# Patient Record
Sex: FEMALE | Race: WHITE | NOT HISPANIC OR LATINO | Employment: OTHER | URBAN - METROPOLITAN AREA
[De-identification: names, ages, dates, MRNs, and addresses within clinical notes are randomized per-mention and may not be internally consistent; named-entity substitution may affect disease eponyms.]

---

## 2018-10-01 ENCOUNTER — OFFICE VISIT - HEALTHEAST (OUTPATIENT)
Dept: FAMILY MEDICINE | Facility: CLINIC | Age: 48
End: 2018-10-01

## 2018-10-01 DIAGNOSIS — Z00.00 HEALTH CARE MAINTENANCE: ICD-10-CM

## 2018-10-01 DIAGNOSIS — J30.9 ALLERGIC RHINITIS: ICD-10-CM

## 2018-10-01 DIAGNOSIS — Z12.31 VISIT FOR SCREENING MAMMOGRAM: ICD-10-CM

## 2018-10-01 DIAGNOSIS — E66.9 OBESITY (BMI 30-39.9): ICD-10-CM

## 2018-10-01 LAB
FASTING STATUS PATIENT QL REPORTED: NORMAL
GLUCOSE BLD-MCNC: 85 MG/DL (ref 74–125)
TSH SERPL DL<=0.005 MIU/L-ACNC: 1.45 UIU/ML (ref 0.3–5)

## 2018-10-01 ASSESSMENT — MIFFLIN-ST. JEOR: SCORE: 1537.71

## 2018-10-22 ENCOUNTER — HOSPITAL ENCOUNTER (OUTPATIENT)
Dept: MAMMOGRAPHY | Facility: CLINIC | Age: 48
Discharge: HOME | End: 2018-10-22
Attending: FAMILY MEDICINE

## 2018-10-22 DIAGNOSIS — Z12.31 VISIT FOR SCREENING MAMMOGRAM: ICD-10-CM

## 2020-07-06 ENCOUNTER — HOSPITAL ENCOUNTER (OUTPATIENT)
Dept: MAMMOGRAPHY | Facility: CLINIC | Age: 50
Discharge: HOME | End: 2020-07-06

## 2020-07-06 DIAGNOSIS — Z12.31 SCREENING MAMMOGRAM, ENCOUNTER FOR: ICD-10-CM

## 2020-07-16 ENCOUNTER — COMMUNICATION - HEALTHEAST (OUTPATIENT)
Dept: SCHEDULING | Facility: CLINIC | Age: 50
End: 2020-07-16

## 2020-07-16 ENCOUNTER — OFFICE VISIT - HEALTHEAST (OUTPATIENT)
Dept: FAMILY MEDICINE | Facility: CLINIC | Age: 50
End: 2020-07-16

## 2020-07-16 DIAGNOSIS — Z00.00 ROUTINE GENERAL MEDICAL EXAMINATION AT A HEALTH CARE FACILITY: ICD-10-CM

## 2020-07-16 DIAGNOSIS — Z12.11 SCREEN FOR COLON CANCER: ICD-10-CM

## 2020-07-16 DIAGNOSIS — Z79.899 CONTROLLED SUBSTANCE AGREEMENT SIGNED: ICD-10-CM

## 2020-07-16 DIAGNOSIS — L57.0 AK (ACTINIC KERATOSIS): ICD-10-CM

## 2020-07-16 DIAGNOSIS — F98.8 ATTENTION DEFICIT DISORDER (ADD) WITHOUT HYPERACTIVITY: ICD-10-CM

## 2020-07-16 DIAGNOSIS — Z97.5 IUD (INTRAUTERINE DEVICE) IN PLACE: ICD-10-CM

## 2020-07-16 DIAGNOSIS — Z76.89 ENCOUNTER TO ESTABLISH CARE: ICD-10-CM

## 2020-07-16 DIAGNOSIS — E66.9 OBESITY (BMI 30-39.9): ICD-10-CM

## 2020-07-16 DIAGNOSIS — Z13.228 SCREENING FOR METABOLIC DISORDER: ICD-10-CM

## 2020-07-16 LAB
CHOLEST SERPL-MCNC: 162 MG/DL
FASTING STATUS PATIENT QL REPORTED: YES
HBA1C MFR BLD: 5.2 %
HDLC SERPL-MCNC: 43 MG/DL
LDLC SERPL CALC-MCNC: 90 MG/DL
TRIGL SERPL-MCNC: 144 MG/DL

## 2020-07-16 ASSESSMENT — MIFFLIN-ST. JEOR: SCORE: 1512.19

## 2020-08-10 ENCOUNTER — RECORDS - HEALTHEAST (OUTPATIENT)
Dept: ADMINISTRATIVE | Facility: OTHER | Age: 50
End: 2020-08-10

## 2020-08-10 LAB — COLOGUARD-ABSTRACT: NEGATIVE

## 2020-08-14 ENCOUNTER — COMMUNICATION - HEALTHEAST (OUTPATIENT)
Dept: FAMILY MEDICINE | Facility: CLINIC | Age: 50
End: 2020-08-14

## 2020-08-14 DIAGNOSIS — F98.8 ATTENTION DEFICIT DISORDER (ADD) WITHOUT HYPERACTIVITY: ICD-10-CM

## 2020-08-17 ENCOUNTER — COMMUNICATION - HEALTHEAST (OUTPATIENT)
Dept: FAMILY MEDICINE | Facility: CLINIC | Age: 50
End: 2020-08-17

## 2020-08-17 DIAGNOSIS — F98.8 ATTENTION DEFICIT DISORDER (ADD) WITHOUT HYPERACTIVITY: ICD-10-CM

## 2020-08-21 ENCOUNTER — OFFICE VISIT - HEALTHEAST (OUTPATIENT)
Dept: FAMILY MEDICINE | Facility: CLINIC | Age: 50
End: 2020-08-21

## 2020-08-21 DIAGNOSIS — F98.8 ATTENTION DEFICIT DISORDER (ADD) WITHOUT HYPERACTIVITY: ICD-10-CM

## 2020-08-21 DIAGNOSIS — Z23 NEED FOR VACCINATION: ICD-10-CM

## 2020-08-21 DIAGNOSIS — N92.6 IRREGULAR MENSTRUAL BLEEDING: ICD-10-CM

## 2020-08-21 DIAGNOSIS — Z12.83 SKIN EXAM, SCREENING FOR CANCER: ICD-10-CM

## 2020-08-21 ASSESSMENT — MIFFLIN-ST. JEOR: SCORE: 1504.14

## 2020-08-21 ASSESSMENT — PATIENT HEALTH QUESTIONNAIRE - PHQ9: SUM OF ALL RESPONSES TO PHQ QUESTIONS 1-9: 5

## 2020-08-21 NOTE — ASSESSMENT & PLAN NOTE
Patient had previous diagnostic testing as an adult to make the diagnosis. She reports she is doing well at this time. Urine tox screenand CSA today. Continue Adderall XR 15 mg daily.

## 2020-08-27 ENCOUNTER — AMBULATORY - HEALTHEAST (OUTPATIENT)
Dept: FAMILY MEDICINE | Facility: CLINIC | Age: 50
End: 2020-08-27

## 2020-08-27 DIAGNOSIS — F98.8 ATTENTION DEFICIT DISORDER (ADD) WITHOUT HYPERACTIVITY: ICD-10-CM

## 2020-08-27 DIAGNOSIS — N92.6 IRREGULAR MENSTRUAL BLEEDING: ICD-10-CM

## 2020-08-27 DIAGNOSIS — N95.1 PERIMENOPAUSE: ICD-10-CM

## 2020-08-27 DIAGNOSIS — Z01.812 PRE-PROCEDURE LAB EXAM: ICD-10-CM

## 2020-08-28 ENCOUNTER — AMBULATORY - HEALTHEAST (OUTPATIENT)
Dept: FAMILY MEDICINE | Facility: CLINIC | Age: 50
End: 2020-08-28

## 2020-08-28 DIAGNOSIS — F90.0 ATTENTION DEFICIT HYPERACTIVITY DISORDER (ADHD), PREDOMINANTLY INATTENTIVE TYPE: ICD-10-CM

## 2020-08-28 DIAGNOSIS — N92.6 IRREGULAR MENSTRUAL BLEEDING: ICD-10-CM

## 2020-08-28 DIAGNOSIS — Z30.432 ENCOUNTER FOR IUD REMOVAL: ICD-10-CM

## 2020-08-28 LAB
AMPHETAMINE-CLINIC: ABNORMAL
BARBITURATES-CLINIC: ABNORMAL
BENZODIAZEPINES-CLINIC: ABNORMAL
BUPRENORPHINE-CLINIC: ABNORMAL
COCAINE-CLINIC: ABNORMAL
ECSTASY-CLINIC: ABNORMAL
HCG UR QL: NEGATIVE
MARIJUANA-CLINIC: ABNORMAL
METHADONE METABOLITE-CLINIC: ABNORMAL
METHAMPHETAMINE-CLINIC: ABNORMAL
OPIATES-CLINIC: ABNORMAL
OXIDANTS: NORMAL
OXYCODONE-CLINIC: ABNORMAL
PCP 25-CLINIC: ABNORMAL
PH-CLINIC: 4 (ref 5–8)
SP GR UR STRIP: 1 (ref 1–1.03)

## 2020-08-28 ASSESSMENT — MIFFLIN-ST. JEOR: SCORE: 1504.6

## 2020-08-31 ENCOUNTER — COMMUNICATION - HEALTHEAST (OUTPATIENT)
Dept: FAMILY MEDICINE | Facility: CLINIC | Age: 50
End: 2020-08-31

## 2020-08-31 DIAGNOSIS — N92.1 MENORRHAGIA WITH IRREGULAR CYCLE: ICD-10-CM

## 2020-09-01 ENCOUNTER — HOSPITAL ENCOUNTER (OUTPATIENT)
Dept: ULTRASOUND IMAGING | Facility: HOSPITAL | Age: 50
Discharge: HOME | End: 2020-09-01
Attending: FAMILY MEDICINE

## 2020-09-01 DIAGNOSIS — N92.6 IRREGULAR MENSTRUAL BLEEDING: ICD-10-CM

## 2020-09-01 LAB
LAB AP CHARGES (HE HISTORICAL CONVERSION): NORMAL
PATH REPORT.COMMENTS IMP SPEC: NORMAL
PATH REPORT.FINAL DX SPEC: NORMAL
PATH REPORT.GROSS SPEC: NORMAL
PATH REPORT.MICROSCOPIC SPEC OTHER STN: NORMAL
PATH REPORT.RELEVANT HX SPEC: NORMAL
RESULT FLAG (HE HISTORICAL CONVERSION): NORMAL

## 2020-09-04 ENCOUNTER — AMBULATORY - HEALTHEAST (OUTPATIENT)
Dept: FAMILY MEDICINE | Facility: CLINIC | Age: 50
End: 2020-09-04

## 2020-09-04 ENCOUNTER — COMMUNICATION - HEALTHEAST (OUTPATIENT)
Dept: FAMILY MEDICINE | Facility: CLINIC | Age: 50
End: 2020-09-04

## 2020-09-04 DIAGNOSIS — N92.1 EXCESSIVE, FREQUENT AND IRREGULAR MENSTRUATION: ICD-10-CM

## 2020-09-15 ENCOUNTER — COMMUNICATION - HEALTHEAST (OUTPATIENT)
Dept: FAMILY MEDICINE | Facility: CLINIC | Age: 50
End: 2020-09-15

## 2020-09-15 DIAGNOSIS — F98.8 ATTENTION DEFICIT DISORDER (ADD) WITHOUT HYPERACTIVITY: ICD-10-CM

## 2020-09-18 ENCOUNTER — RECORDS - HEALTHEAST (OUTPATIENT)
Dept: HEALTH INFORMATION MANAGEMENT | Facility: CLINIC | Age: 50
End: 2020-09-18

## 2020-09-30 ENCOUNTER — RECORDS - HEALTHEAST (OUTPATIENT)
Dept: ADMINISTRATIVE | Facility: OTHER | Age: 50
End: 2020-09-30

## 2020-10-06 ENCOUNTER — RECORDS - HEALTHEAST (OUTPATIENT)
Dept: ADMINISTRATIVE | Facility: OTHER | Age: 50
End: 2020-10-06

## 2020-10-19 ENCOUNTER — COMMUNICATION - HEALTHEAST (OUTPATIENT)
Dept: FAMILY MEDICINE | Facility: CLINIC | Age: 50
End: 2020-10-19

## 2020-10-20 ENCOUNTER — COMMUNICATION - HEALTHEAST (OUTPATIENT)
Dept: FAMILY MEDICINE | Facility: CLINIC | Age: 50
End: 2020-10-20

## 2020-10-22 ENCOUNTER — OFFICE VISIT - HEALTHEAST (OUTPATIENT)
Dept: FAMILY MEDICINE | Facility: CLINIC | Age: 50
End: 2020-10-22

## 2020-10-22 DIAGNOSIS — F98.8 ATTENTION DEFICIT DISORDER (ADD) WITHOUT HYPERACTIVITY: ICD-10-CM

## 2020-10-22 RX ORDER — NORETHINDRONE ACETATE AND ETHINYL ESTRADIOL 1; 20 MG/1; UG/1
1 TABLET ORAL DAILY
Status: SHIPPED | COMMUNITY
Start: 2020-10-06 | End: 2021-09-09

## 2020-10-22 NOTE — ASSESSMENT & PLAN NOTE
Increase Adderall XR to 20 mg daily. If doing well, we will plan recheck in 6 months. If not tolerating the higher dose, she will sendme a Linkdex message about decreasing back to 15 mg daily. If still not adequately controlled, she will schedule additional follow up. [place] and [place] are no concerns.

## 2020-11-10 ENCOUNTER — AMBULATORY - HEALTHEAST (OUTPATIENT)
Dept: NURSING | Facility: CLINIC | Age: 50
End: 2020-11-10

## 2020-11-10 DIAGNOSIS — Z23 NEED FOR VACCINATION: ICD-10-CM

## 2020-11-23 ENCOUNTER — COMMUNICATION - HEALTHEAST (OUTPATIENT)
Dept: FAMILY MEDICINE | Facility: CLINIC | Age: 50
End: 2020-11-23

## 2020-11-23 DIAGNOSIS — F98.8 ATTENTION DEFICIT DISORDER (ADD) WITHOUT HYPERACTIVITY: ICD-10-CM

## 2020-11-23 DIAGNOSIS — F90.0 ATTENTION DEFICIT HYPERACTIVITY DISORDER (ADHD), PREDOMINANTLY INATTENTIVE TYPE: ICD-10-CM

## 2020-12-21 ENCOUNTER — COMMUNICATION - HEALTHEAST (OUTPATIENT)
Dept: FAMILY MEDICINE | Facility: CLINIC | Age: 50
End: 2020-12-21

## 2020-12-21 DIAGNOSIS — F98.8 ATTENTION DEFICIT DISORDER (ADD) WITHOUT HYPERACTIVITY: ICD-10-CM

## 2021-01-21 ENCOUNTER — COMMUNICATION - HEALTHEAST (OUTPATIENT)
Dept: FAMILY MEDICINE | Facility: CLINIC | Age: 51
End: 2021-01-21

## 2021-01-21 DIAGNOSIS — F98.8 ATTENTION DEFICIT DISORDER (ADD) WITHOUT HYPERACTIVITY: ICD-10-CM

## 2021-02-22 ENCOUNTER — COMMUNICATION - HEALTHEAST (OUTPATIENT)
Dept: FAMILY MEDICINE | Facility: CLINIC | Age: 51
End: 2021-02-22

## 2021-02-22 DIAGNOSIS — F98.8 ATTENTION DEFICIT DISORDER (ADD) WITHOUT HYPERACTIVITY: ICD-10-CM

## 2021-03-10 ENCOUNTER — COMMUNICATION - HEALTHEAST (OUTPATIENT)
Dept: FAMILY MEDICINE | Facility: CLINIC | Age: 51
End: 2021-03-10

## 2021-03-19 ENCOUNTER — OFFICE VISIT - HEALTHEAST (OUTPATIENT)
Dept: FAMILY MEDICINE | Facility: CLINIC | Age: 51
End: 2021-03-19

## 2021-03-19 DIAGNOSIS — F98.8 ATTENTION DEFICIT DISORDER (ADD) WITHOUT HYPERACTIVITY: ICD-10-CM

## 2021-03-19 NOTE — ASSESSMENT & PLAN NOTE
Patient is tolerating her medication well but seems to be having some decreased efficacy. Mood is otherwise good. Increase dose to 25 mg daily. CSA and urine tox [place] date.  [place], [place]. Refill sent. Recheck with physical in 6 months.

## 2021-04-22 ENCOUNTER — COMMUNICATION - HEALTHEAST (OUTPATIENT)
Dept: FAMILY MEDICINE | Facility: CLINIC | Age: 51
End: 2021-04-22

## 2021-04-22 DIAGNOSIS — F98.8 ATTENTION DEFICIT DISORDER (ADD) WITHOUT HYPERACTIVITY: ICD-10-CM

## 2021-05-26 ENCOUNTER — COMMUNICATION - HEALTHEAST (OUTPATIENT)
Dept: FAMILY MEDICINE | Facility: CLINIC | Age: 51
End: 2021-05-26

## 2021-05-26 DIAGNOSIS — F98.8 ATTENTION DEFICIT DISORDER (ADD) WITHOUT HYPERACTIVITY: ICD-10-CM

## 2021-05-26 RX ORDER — DEXTROAMPHETAMINE SACCHARATE, AMPHETAMINE ASPARTATE MONOHYDRATE, DEXTROAMPHETAMINE SULFATE AND AMPHETAMINE SULFATE 6.25; 6.25; 6.25; 6.25 MG/1; MG/1; MG/1; MG/1
25 CAPSULE, EXTENDED RELEASE ORAL DAILY
Qty: 30 CAPSULE | Refills: 0 | Status: SHIPPED | OUTPATIENT
Start: 2021-05-26 | End: 2021-08-23

## 2021-05-27 VITALS — DIASTOLIC BLOOD PRESSURE: 76 MMHG | RESPIRATION RATE: 16 BRPM | HEART RATE: 88 BPM | SYSTOLIC BLOOD PRESSURE: 118 MMHG

## 2021-05-27 ASSESSMENT — PATIENT HEALTH QUESTIONNAIRE - PHQ9: SUM OF ALL RESPONSES TO PHQ QUESTIONS 1-9: 5

## 2021-05-28 ENCOUNTER — RECORDS - HEALTHEAST (OUTPATIENT)
Dept: ADMINISTRATIVE | Facility: CLINIC | Age: 51
End: 2021-05-28

## 2021-06-02 ENCOUNTER — RECORDS - HEALTHEAST (OUTPATIENT)
Dept: ADMINISTRATIVE | Facility: CLINIC | Age: 51
End: 2021-06-02

## 2021-06-02 VITALS — HEIGHT: 64 IN | BODY MASS INDEX: 35.26 KG/M2 | WEIGHT: 206.5 LBS

## 2021-06-04 VITALS
WEIGHT: 200 LBS | HEART RATE: 85 BPM | BODY MASS INDEX: 34.15 KG/M2 | HEIGHT: 64 IN | OXYGEN SATURATION: 97 % | SYSTOLIC BLOOD PRESSURE: 124 MMHG | DIASTOLIC BLOOD PRESSURE: 68 MMHG

## 2021-06-04 VITALS
HEIGHT: 64 IN | WEIGHT: 199.2 LBS | TEMPERATURE: 98.4 F | BODY MASS INDEX: 34.01 KG/M2 | RESPIRATION RATE: 12 BRPM | HEART RATE: 68 BPM | DIASTOLIC BLOOD PRESSURE: 78 MMHG | SYSTOLIC BLOOD PRESSURE: 118 MMHG

## 2021-06-04 VITALS
BODY MASS INDEX: 33.99 KG/M2 | HEIGHT: 64 IN | SYSTOLIC BLOOD PRESSURE: 122 MMHG | DIASTOLIC BLOOD PRESSURE: 82 MMHG | WEIGHT: 199.1 LBS | RESPIRATION RATE: 12 BRPM | TEMPERATURE: 98.3 F | HEART RATE: 68 BPM

## 2021-06-09 NOTE — TELEPHONE ENCOUNTER
Pt was in earlier today and logged into her MyChart this evening and noticed that it stated that a Cologuard was done in the clinic but pt states this has not been done during the visit.     Per Chart Review, cologuard was ordered. Pt is inquiring if this is an extra appointment that needs to be scheduled? Please contact pt on how she can complete this screening.     Will route note to PCP to review.       Additional Information    General information question, no triage required and triager able to answer question    Protocols used: INFORMATION ONLY CALL-A-AH

## 2021-06-09 NOTE — TELEPHONE ENCOUNTER
Reached out to patient and informed her she will receive a Cologuard kit via mail. No additional questions at this time. Judith Nicholson

## 2021-06-09 NOTE — TELEPHONE ENCOUNTER
This is a simple message no need to forward ( I hope all the nurses known that), cologaurd kit never given in the clinic it is send from the vendor  But ordered in the clinic     Judit Ibarra MD 7/17/2020 9:41 AM

## 2021-06-10 NOTE — TELEPHONE ENCOUNTER
Looks like there is another encounter for this medication from 8-17-20. Please advise since it has not been filled yet.

## 2021-06-10 NOTE — PATIENT INSTRUCTIONS - HE
For a few hours, you may feel some mild cramping. This can usually be relieved with over-the-counter pain medicines.    You may have some bleeding for a few days. Use pads instead of tampons.    Don t douche or use any vaginal medicines unless your health care provider says it s OK.    You may resume sexual intercourse when any bleeding or cramping have resolved.

## 2021-06-10 NOTE — PATIENT INSTRUCTIONS - HE
1. Continue current dosing of Adderal. Please send me a note in My Chart when you are in need of a refill.  2. I have placed dermatology referral. Please call to schedule.  3. I will see you as scheduled for IUD removal and endometrial biopsy. Take Ibuprofen 400 mg prior to visit.  4. You will get a call to schedule ultrasound.

## 2021-06-10 NOTE — PATIENT INSTRUCTIONS - HE
For a few hours, you may feel some mild cramping. This can usually be relieved with over-the-counter pain medicines.    You may have some bleeding for a few days. Use pads instead of tampons.    Don t douche or use any vaginal medicines unless your health care provider says it s OK.    You may resume sexual intercourse when any bleeding or cramping have resolved.    We will notify you of lab results.

## 2021-06-10 NOTE — PROGRESS NOTES
Assessment/Plan:      Problem List Items Addressed This Visit     ADHD, Predominantly Inattentive Type     Patient had previous diagnostic testing as an adult to make the diagnosis. She reports she is doing well at this time. Urine tox screen and CSA today. Continue Adderall XR 15 mg daily.          Irregular menstrual bleeding - Primary     I am concerned about her frequent bleeding despite her Mirena IUD. IUD is due for replacement as well. I am recommending removal of IUD, endometrial biopsy, and pelvic ultrasound. If work up is negative and she wants replacement of IUD that would be acceptable.         Relevant Orders    US Pelvis With Transvaginal Non OB      Other Visit Diagnoses     Need for vaccination        Relevant Orders    Varicella Zoster, Recombinant Vaccine IM    Skin exam, screening for cancer        Relevant Orders    Ambulatory referral to Dermatology          Patient Instructions   1. Continue current dosing of Adderal. Please send me a note in My Chart when you are in need of a refill.  2. I have placed dermatology referral. Please call to schedule.  3. I will see you as scheduled for IUD removal and endometrial biopsy. Take Ibuprofen 400 mg prior to visit.  4. You will get a call to schedule ultrasound.          Subjective:   Allyssa Pedersen is a 50 y.o. female who presents today to establish with a new provider. She has a couple of other concerns today.     First, she would like a referral to dermatology for a general skin check.    The patient also has adult ADHD. It was diagnosed in 2013 after her son was diagnosed. She was evaluated with formal ADHD questionnaires through Dr. Mojica. She is doing well on her current dosing.    She is also due for change of her Mirena IUD. She is having irregular bleeding, it can be on for 3 weeks, then gone for 3 weeks. She started the IUD due to heavy menses which is better.     Patient Active Problem List   Diagnosis     Obesity (BMI 30-39.9)     ADHD,  "Predominantly Inattentive Type     IUD (intrauterine device) in place     Plantar fasciitis, left     Irregular menstrual bleeding      Past Medical History:   Diagnosis Date     Acute non-recurrent maxillary sinusitis 10/9/2017     ADHD, Predominantly Inattentive Type     Created by Conversion  Replacement Utility updated for latest IMO load     Obesity (BMI 30-39.9)     Created by Conversion      Plantar fasciitis, left 9/22/2016     Past Surgical History:   Procedure Laterality Date     AR REMOVAL OF TONSILS,<13 Y/O      Description: Tonsillectomy;  Recorded: 12/28/2007;     WISDOM TOOTH EXTRACTION         Review of Systems   Constitutional: Negative for activity change, appetite change, fever and unexpected weight change.   HENT: Negative for congestion, hearing loss and sore throat.    Eyes: Negative for discharge and visual disturbance.   Respiratory: Negative for cough, shortness of breath and wheezing.    Cardiovascular: Negative for chest pain, palpitations and leg swelling.   Gastrointestinal: Negative for abdominal pain, blood in stool, constipation, diarrhea and vomiting.   Endocrine: Negative for polydipsia and polyuria.   Genitourinary: Negative for decreased urine volume, difficulty urinating, dysuria, frequency, hematuria and urgency.   Musculoskeletal: Negative for arthralgias, gait problem and myalgias.   Skin: Negative for rash.   Allergic/Immunologic: Negative for immunocompromised state.   Neurological: Negative for weakness.   Hematological: Does not bruise/bleed easily.   Psychiatric/Behavioral: Negative for dysphoric mood and sleep disturbance. The patient is not nervous/anxious.          Objective:     Vitals:    08/21/20 0902   BP: 122/82   Pulse: 68   Resp: 12   Temp: 98.3  F (36.8  C)   TempSrc: Oral   Weight: 199 lb 1.6 oz (90.3 kg)   Height: 5' 3.75\" (1.619 m)   LMP: 07/31/2020       Physical Exam  Constitutional:       General: She is not in acute distress.     Appearance: She is " well-developed.   HENT:      Right Ear: Tympanic membrane and ear canal normal.      Left Ear: Tympanic membrane and ear canal normal.   Eyes:      Conjunctiva/sclera: Conjunctivae normal.      Pupils: Pupils are equal, round, and reactive to light.   Neck:      Musculoskeletal: Normal range of motion and neck supple.      Thyroid: No thyromegaly.      Vascular: No carotid bruit.   Cardiovascular:      Rate and Rhythm: Normal rate and regular rhythm.      Heart sounds: No murmur.   Pulmonary:      Effort: Pulmonary effort is normal. No respiratory distress.      Breath sounds: Normal breath sounds. No decreased breath sounds, wheezing or rhonchi.   Abdominal:      General: Bowel sounds are normal. There is no distension.      Palpations: Abdomen is soft. Abdomen is not rigid.      Tenderness: There is no abdominal tenderness. There is no guarding or rebound.      Hernia: There is no hernia in the ventral area.   Lymphadenopathy:      Cervical: No cervical adenopathy.   Skin:     Findings: No rash.   Neurological:      Mental Status: She is alert and oriented to person, place, and time.      Cranial Nerves: No cranial nerve deficit.      Gait: Gait normal.      Deep Tendon Reflexes:      Reflex Scores:       Bicep reflexes are 2+ on the right side and 2+ on the left side.       Patellar reflexes are 2+ on the right side and 2+ on the left side.  Psychiatric:         Behavior: Behavior normal.         Thought Content: Thought content normal.         Judgment: Judgment normal.

## 2021-06-10 NOTE — TELEPHONE ENCOUNTER
Controlled Substance Refill Request  Medication Name:   Requested Prescriptions     Pending Prescriptions Disp Refills     dextroamphetamine-amphetamine (ADDERALL XR) 15 MG 24 hr capsule 30 capsule 0     Sig: Take 1 capsule (15 mg total) by mouth daily.     Date Last Fill: 7/16/20  Requested Pharmacy: [facility]  Submit electronically to pharmacy  Controlled Substance Agreement on file:   Encounter-Level CSA Scan Date:    There are no encounter-level csa scan date.        Last office visit:  7/16/20

## 2021-06-11 NOTE — TELEPHONE ENCOUNTER
Patient up to date on office visit, CSA, and urine tox screen. [place]. No concerns. Refill sent.

## 2021-06-12 NOTE — PATIENT INSTRUCTIONS - HE
1. Increase Adderall XR to 20 mg daily.  2. If doing well, please let me know when you need refills. You should be seen for follow up in 6 months.  3. If you do not tolerate the higher dose, please send me a message in Roll[employer] [email or web address]) and we can decrease back to 15 mg daily.  4. If symptoms still not well controlled, please schedule follow up appointment.

## 2021-06-12 NOTE — TELEPHONE ENCOUNTER
Please contact patient to schedule virtual visit to discuss changing her dosage as per her message.

## 2021-06-12 NOTE — PROGRESS NOTES
"Allyssa Pedersen is a 50 y.o. female who is being evaluated via a billable telephone visit.      The patient has been notified of following:     \"This telephone visit will be conducted via a call between you and your physician/provider. We have found that certain health care needs can be provided without the need for a physical exam.  This service lets us provide the care you need with a short phone conversation.  If a prescription is necessary we can send it directly to your pharmacy.  If lab work is needed we can place an order for that and you can then stop by our lab to have the test done at a later time.    Telephone visits are billed at different rates depending on your insurance coverage. During this emergency period, for some insurers they may be billed the same as an in-person visit.  Please reach out to your insurance provider with any questions.    If during the course of the call the physician/provider feels a telephone visit is not appropriate, you will not be charged for this service.\"    Patient has given verbal consent to a Telephone visit? Yes    What phone number would you like to be contacted at? [phone]    Patient would like to receive their AVS by AVS Preference: Mail a copy.    Allyssa Pedersen is a 50 y.o. female who is being evaluated via a billable telephone visit. Patient verbally consented to the telephone service today YES.  Patient location during visit: home      HPI: The patient is seen today via virtual phone visit regarding ADHD follow up.  She reports that she is tolerating her ADHD medication well but she is still noticing trouble with focusing and staying on task. She is wondering about trying a slightly higher dose. Her mood is otherwise good and she is sleeping well.    I have reviewed and updated the patient's Past Medical History, Social History, Family History and Medication List.    ALLERGIES  Patient has no known allergies.      Assessment/Plan:  Problem List Items Addressed " This Visit     ADHD, Predominantly Inattentive Type     Increase Adderall XR to 20 mg daily. If doing well, we will plan recheck in 6 months. If not tolerating the higher dose, she will send me a Opt[employer] message about decreasing back to 15 mg daily. If still not adequately controlled, she will schedule additional follow up. [place] and there are no concerns.         Relevant Medications    dextroamphetamine-amphetamine (ADDERALL XR) 20 MG 24 hr capsule             Phone call duration:  5 minutes    Alie Mcclendon MD

## 2021-06-13 NOTE — TELEPHONE ENCOUNTER
[place]:    Last fill    11/23/2020  #30  10/22/2020  #30  09/15/2020  #30    No other meds noted    Kasia Rangel LPN

## 2021-06-16 PROBLEM — N92.1 EXCESSIVE, FREQUENT AND IRREGULAR MENSTRUATION: Status: ACTIVE | Noted: 2020-09-04

## 2021-06-16 NOTE — PROGRESS NOTES
Problem List Items Addressed This Visit     ADHD, Predominantly Inattentive Type     Patient is tolerating her medication well but seems to be having some decreased efficacy. Mood is otherwise good. Increase dose to 25 mg daily. CSA and urine tox up to date.  [place], [place]. Refill sent. Recheck with physical in 6 months.         Relevant Medications    dextroamphetamine-amphetamine (ADDERALL XR) 25 MG 24 hr capsule          Patient Instructions   1. Increase Adderall to 25 mg daily.  2. Recheck in 6 months, sooner if you are having any trouble.           Subjective: Allyssa Pedersen is a 50 y.o. female who is being evaluated via a billable video visit.  The patient is needing follow up on her ADHD. She is taking adderall 20 mg daily. She reports it was doing well but lately she is having more trouble staying on task and staying engaged in conversation. She reports she is tolerating her medication well.     Objective: Patient awake and alert in no apparent distress. Affect is great. No CN deficits.     How would you like to obtain your AVS? MyChart.  If dropped from the video visit, the video invitation should be resent by: Text to cell phone: [phone]  Will anyone else be joining your video visit? No      Video Start Time: 10:31      Video-Visit Details    Type of service:  Video Visit    Video End Time (time video stopped): 10:38 AM  Originating Location (pt. Location): Home    Distant Location (provider location):  [facility]     Platform used for Video Visit: MegaHoot

## 2021-06-16 NOTE — PATIENT INSTRUCTIONS - HE
1. Increase Adderall to 25 mg daily.  2. Recheck in 6 months, sooner if you are having any trouble.

## 2021-06-20 NOTE — LETTER
Letter by Judit Ibarra MD at      Author: Judit Ibarra MD Service: -- Author Type: --    Filed:  Encounter Date: 7/16/2020 Status: (Other)         [facility] FAMILY MEDICINE/OB  07/16/20    Patient: Allyssa Pedersen  YOB: 1970  Medical Record Number: [id number]  CSN: [id number]                                                                              Non-opioid Controlled Substance Agreement    I understand that my care provider has prescribed a controlled substance to help manage my condition(s). I am taking this medicine to help me function or work. I know this is strong medicine, and that it can cause serious side effects. Controlled substances can be sedating, addicting and may cause a dependency on the drug. They can affect my ability to drive or think, and cause depression. They need to be taken exactly as prescribed. Combining controlled substances with certain medicines or chemicals (such as cocaine, sedatives and tranquilizers, sleeping pills, meth) can be dangerous or even fatal. Also, if I stop controlled substances suddenly, I may have severe withdrawal symptoms.  If not helpful, I may be asked to stop them.    The risks, benefits, and side effects of these medicine(s) were explained to me. I agree that:    1. I will take part in other treatments as advised by my care team. This may be psychiatry or counseling, physical therapy, behavioral therapy, group treatment or a referral to a pain clinic. I will reduce or stop my medicine when my care team tells me to do so.  2. I will take my medicines as prescribed. I will not change the dose or schedule unless my care team tells me to. There will be no refills if I run out early.  I may be contactedwithout warning and asked to complete a urine drug test or pill count at any time.   3. I will keep all my appointments, and understand this is part of the monitoring of controlled substances. My care team may require an office visit for EVERY  controlled substance refill. If I miss appointments or dont follow instructions, my care team may stop my medicine.  4. I will not ask other providers to prescribe controlled substances, and I will not accept controlled substances from other people. If I need another prescribed controlled substance for a new reason, I will tell my care team within 1 business day.  5. I will use one pharmacy to fill all of my controlled substance prescriptions, and it is up to me to make sure that I do not run out of my medicines on weekends or holidays. If my care team is willing to refill my controlled substance prescription without a visit, I must request refills only during office hours, refills may take up to 3 days to process, and it may take up to 5 to 7 days for my medicine to be mailed and ready at my pharmacy. Prescriptions will not be mailed anywhere except my pharmacy.    6. I am responsible for my prescriptions. If the medicine/prescription is lost or stolen, it will not be replaced. I also agree not to share controlled substance medicines with anyone.          [facility] FAMILY MEDICINE/OB  07/16/20  Patient:  Allyssa Pedersen  YOB: 1970  Medical Record Number: [id number]  CSN: [id number]    7. I agree to not use ANY illegal or recreational drugs. This includes marijuana, cocaine, bath salts or other drugs. I agree not to use alcohol unless my care team says I may. I agree to give urine samples whenever asked. If I dont give a urine sample, the care team may stop my medicine.    8. If I enroll in the [place] Medical Marijuana program, I will tell my care team. I will also sign an agreement to share my medical records with my care team.    9. I will bring in my list of medicines (or my medicine bottles) each time I come to the clinic.   10. I will tell my care team right away if I become pregnant or have a new medical problem treated outside of my regular clinic.  11. I understand that this medicine  can affect my thinking and judgment. It may be unsafe for me to drive, use machinery and do dangerous tasks. I will not do any of these things until I know how the medicine affects me. If my dose changes, I will wait to see how it affects me. I will contact my care team if I have concerns about medicine side effects.    I understand that if I do not follow any of the conditions above, my prescriptions or treatment may be stopped.      I agree that my provider, clinic care team, and pharmacy may work with any city, state or federal law enforcement agency that investigates the misuse, sale, or other diversion of my controlled medicine. I will allow my provider to discuss my care with or share a copy of this agreement with any other treating provider, pharmacy or emergency room where I receive care. I agree to give up (waive) any right of privacy or confidentiality with respect to these consents.   I have read this agreement and have asked questions about anything I did not understand.    ___________________________________________________________________________  Patient signature - Date/Time  -Allyssa Pedersen                                      ___________________________________________________________________________  Witness signature                                                                    ___________________________________________________________________________  Provider signature- Judti Ibarra MD

## 2021-06-20 NOTE — PROGRESS NOTES
Assessment/Plan:    1. Health care maintenance  Immunizations reviewed --- she is declining seasonal flu shot, will update tetanus today.  Mammography reviewed--- David completed  Pap reviewed normal 2016 with negative HPV, repeat 2021.  Patient defers pelvic today's due to spotting/use of tampon.  Routine Dental and Eye care recommended--- patient denies need for referral for routine eye check but agrees to schedule.  Discussed importance of regular exercise and appropriate calcium intake  Discussed Advance Directives--she is given copy of honoring choices and encouraged to complete and return copy to us.    2. Visit for screening mammogram  - Mammo Screening Bilateral; Future    3. Obesity (BMI 30-39.9)  Time spent counseling patient on making appropriate food choices, portion control.  I would encourage her to discuss her food/eating issues with her counselor--- patient deferring treatment of situational depression/anxiety though we talk about option of considering medication..  We will check thyroid and glucose today to rule out related metabolic issues.  We spent time talking about how to increase activity.  She commits to walking once a week with a friend and will consider signing up for an exercise class to help with accountability/motivation.  - Thyroid Cascade  - Glucose    4. Allergic rhinitis  Continue as needed use of Flonase seasonally.    Pt states an understanding and agrees with the above plan.    I have had an Advance Directives discussion with the patient.  The following high BMI interventions were performed this visit: encouragement to exercise and weight monitoring            Health Maintenance   Topic Date Due     ADVANCE DIRECTIVES DISCUSSED WITH PATIENT  06/20/1988     MAMMOGRAM  12/08/2017     TD 18+ HE  12/28/2017     INFLUENZA VACCINE RULE BASED (1) 08/01/2018     PAP SMEAR  11/18/2021     TDAP ADULT ONE TIME DOSE  Completed         HPI    Patient is a 48 y.o. female presents for a physical  exam.  Current issues patient has been having a little left-sided back pain over the last week.  It has been slowly getting better with use of anti-inflammatories.  No history of chronic back issues.  She denies any urinary complaints or other associated symptoms.    Allyssa is struggling with her weight.  Review of her chart shows weight gain of over 30 pounds since 2015.  She comments on some situational issues--daughter struggling with anxiety, another daughter anticipating leaving for [place] to do college, her mother living with them.  She admits to stress eating.  She is in counseling a couple times a month but has not addressed her eating her weight gain issues in counseling.  She is doing no form of regular exercise.  It is noted that patient's previous glucose was normal, ten-year CVD risk calculated 2 years ago was low (though did not account for family history her weight).  She has not had thyroid checked in several years.    She is been previously diagnosed with ADHD and was on medication while working.  She is not currently employed and thinks she may return to the workforce next year.  She admits to feeling overwhelmed at times and very unmotivated with poor concentration but she continues to attribute this to her ADHD.  She is not suicidal or homicidal.  She becomes mildly tearful when talking about her stressors.  She maintains good eye contact and appropriate conversation throughout.  She does not want to consider medication at this time--- we briefly discussed that medication like Wellbutrin can help with both ADHD and depression symptoms.    Patient remains sexually active.  She has an IUD in place that was placed in 2015 at [facility].  She admits she does not like the irregular spotting/unpredictability.  She has had some hot flashes and the noted mood changes above.  No vaginal dryness.  She currently is spotting and using a tampon would like to defer pelvic exam today.  She denies any problems  with abnormal vaginal discharge, urinary or bowel complaints.      The following portions of the patient's history were reviewed and updated as appropriate: allergies, current medications, past family history, past medical history, past social history, past surgical history and problem list.    Review of Systems  Pertinent items are noted in HPI.  A 12 point comprehensive review of systems was negative except as noted.  She does note that her vision is changing some (needing readers), and will schedule her routine eye appointment    Immunization History   Administered Date(s) Administered     Hep A, historic 12/28/2007, 07/06/2009     Influenza, inj, historic,unspecified 10/23/2012     Influenza, seasonal,quad inj 36+ mos 09/04/2015     Influenza, seasonal,quad inj 6-35 mos 11/17/2010     Td,adult,historic,unspecified 12/28/2007     Tdap 12/28/2007     No results found for this or any previous visit (from the past 240 hour(s)).    Patient Active Problem List   Diagnosis     Menorrhagia     Obesity (BMI 30-39.9)     Foot Pain (Soft Tissue)     ADHD, Predominantly Inattentive Type     IUD (intrauterine device) in place     Family History   Problem Relation Age of Onset     Thyroid disease Mother      Multiple fractures Father      fell and paralyzed     Depression Daughter      Anxiety disorder Daughter      Depression Daughter      Anxiety disorder Daughter      ADD / ADHD Daughter      ADD / ADHD Son      Social History     Social History     Marital status:      Spouse name: N/A     Number of children: N/A     Years of education: N/A     Occupational History     Not on file.     Social History Main Topics     Smoking status: Never Smoker     Smokeless tobacco: Not on file     Alcohol use Yes      Comment: 1/week     Drug use: Not on file     Sexual activity: Yes     Partners: Male     Birth control/ protection: IUD     Other Topics Concern     Not on file     Social History Narrative       Objective:      "126/81 (Patient Site: Left Arm, Patient Position: Sitting, Cuff Size: Adult Large)  Pulse 98  Temp 97.5  F (36.4  C) (Oral)   Resp 16  Ht 5' 3.75\" (1.619 m)  Wt 206 lb 8 oz (93.7 kg)  BMI 35.72 kg/m2      General Appearance:    Alert, cooperative, no distress, appears stated age   Head:    Normocephalic, without obvious abnormality, atraumatic   Eyes:    PERRL, conjunctiva/corneas clear, EOM's intact both eyes   Ears:    Normal TM's and external ear canals, both ears   Nose:   Nares normal, septum midline, mucosa normal   Throat:   Lips, mucosa, and tongue normal; teeth and gums normal   Neck:   Supple, symmetrical, trachea midline, no adenopathy;     thyroid:  no enlargement/tenderness/nodules; no carotid    bruit or JVD   Back:     Symmetric, no curvature, no CVA tenderness   Lungs:     Clear to auscultation bilaterally, respirations unlabored   Chest Wall:    No tenderness or deformity    Heart:    Regular rate and rhythm, S1 and S2 normal, no murmur, rub   or gallop   Breast Exam:    No tenderness, masses, or nipple abnormality   Abdomen:     Soft, non-tender, bowel sounds active all four quadrants,     no masses, no organomegaly   Genitalia:   exam deferred   Rectal:   exam deferred   Extremities:   Extremities normal, atraumatic, no cyanosis or edema   Pulses:   2+ and symmetric all extremities   Skin:   Skin color, texture, turgor normal, no rashes or lesions       Neurologic:   CNII-XII intact            "

## 2021-06-20 NOTE — LETTER
Letter by Alie Mcclendon MD at      Author: Alie Mcclendon MD Service: -- Author Type: --    Filed:  Encounter Date: 8/21/2020 Status: (Other)         [facility]/OB  08/21/20    Patient: Allyssa Pedersen  YOB: 1970  Medical Record Number: [id number]  CSN: [id number]                                                                              Non-opioid Controlled Substance Agreement    I understand that my care provider has prescribed a controlled substance to help manage my condition(s). I am taking this medicine to help me function or work. I know this is strong medicine, and that it can cause serious side effects. Controlled substances can be sedating, addicting and may cause a dependency on the drug. They can affect my ability to drive or think, and cause depression. They need to be taken exactly as prescribed. Combining controlled substances with certain medicines or chemicals (such as cocaine, sedatives and tranquilizers, sleeping pills, meth) can be dangerous or even fatal. Also, if I stop controlled substances suddenly, I may have severe withdrawal symptoms.  If not helpful, I may be asked to stop them.    The risks, benefits, and side effects of these medicine(s) were explained to me. I agree that:    1. I will take part in other treatments as advised by my care team. This may be psychiatry or counseling, physical therapy, behavioral therapy, group treatment or a referral to a pain clinic. I will reduce or stop my medicine when my care team tells me to do so.  2. I will take my medicines as prescribed. I will not change the dose or schedule unless my care team tells me to. There will be no refills if I run out early.  I may be contactedwithout warning and asked to complete a urine drug test or pill count at any time.   3. I will keep all my appointments, and understand this is part of the monitoring of controlled substances. My care team may require an office visit for EVERY  controlled substance refill. If I miss appointments or dont follow instructions, my care team may stop my medicine.  4. I will not ask other providers to prescribe controlled substances, and I will not accept controlled substances from other people. If I need another prescribed controlled substance for a new reason, I will tell my care team within 1 business day.  5. I will use one pharmacy to fill all of my controlled substance prescriptions, and it is up to me to make sure that I do not run out of my medicines on weekends or holidays. If my care team is willing to refill my controlled substance prescription without a visit, I must request refills only during office hours, refills may take up to 3 days to process, and it may take up to 5 to 7 days for my medicine to be mailed and ready at my pharmacy. Prescriptions will not be mailed anywhere except my pharmacy.    6. I am responsible for my prescriptions. If the medicine/prescription is lost or stolen, it will not be replaced. I also agree not to share controlled substance medicines with anyone.          [facility]/OB  08/21/20  Patient:  Allyssa Pedersen  YOB: 1970  Medical Record Number: [id number]  CSN: [id number]    7. I agree to not use ANY illegal or recreational drugs. This includes marijuana, cocaine, bath salts or other drugs. I agree not to use alcohol unless my care team says I may. I agree to give urine samples whenever asked. If I dont give a urine sample, the care team may stop my medicine.    8. If I enroll in the [place] Medical Marijuana program, I will tell my care team. I will also sign an agreement to share my medical records with my care team.    9. I will bring in my list of medicines (or my medicine bottles) each time I come to the clinic.   10. I will tell my care team right away if I become pregnant or have a new medical problem treated outside of my regular clinic.  11. I understand that this medicine can affect my  thinking and judgment. It may be unsafe for me to drive, use machinery and do dangerous tasks. I will not do any of these things until I know how the medicine affects me. If my dose changes, I will wait to see how it affects me. I will contact my care team if I have concerns about medicine side effects.    I understand that if I do not follow any of the conditions above, my prescriptions or treatment may be stopped.      I agree that my provider, clinic care team, and pharmacy may work with any city, state or federal law enforcement agency that investigates the misuse, sale, or other diversion of my controlled medicine. I will allow my provider to discuss my care with or share a copy of this agreement with any other treating provider, pharmacy or emergency room where I receive care. I agree to give up (waive) any right of privacy or confidentiality with respect to these consents.   I have read this agreement and have asked questions about anything I did not understand.    ___________________________________________________________________________  Patient signature - Date/Time  -Allyssa Pedersen                                      ___________________________________________________________________________  Witness signature                                                                    ___________________________________________________________________________  Provider signature- Alie Mcclendon MD

## 2021-06-26 ENCOUNTER — HEALTH MAINTENANCE LETTER (OUTPATIENT)
Age: 51
End: 2021-06-26

## 2021-06-29 NOTE — PROGRESS NOTES
Progress Notes by Judit Ibarra MD at 7/16/2020 12:20 PM     Author: Judit Ibarra MD Service: -- Author Type: Physician    Filed: 7/16/2020  3:39 PM Encounter Date: 7/16/2020 Status: Signed    : Judit Ibarra MD (Physician)       FEMALE PREVENTATIVE EXAM    Assessment and Plan:       Allyssa was seen today for establish care, annual exam and nevus.    Routine general medical examination at a health care facility    I discussed the following with the patient:   Adult Healthy Living: Importance of regular exercise  Healthy nutrition  Getting adequate sleep  Stress management  Supplement use  Herbal medications/alternative medical therapies    Controlled substance agreement signed 7/16/20  I have queried the [place] and/or [place] Prescription Monitoring Program for this patient for the preceding 12 months, or reviewed the report provided by my proxy delegate. I have not identified any concerns.    AK (actinic keratosis)  Comments:  3 Ak spots treated today    After discussion of the risks, benefits and alternatives to treatment with cryotherapy, informed consent was obtainedhe treatments, side effects and failure rates are discussed. We treated a total of 3 AKlesion(s) with 3, 15-second freeze-thaw cycles of liquid nitrogen cryotherapy  The expected skin reaction including erythema, pain, scabbing, blistering and hypopigmented scar formation was discussed.  See at intervals until warts resolved  Orders:  -     Ambulatory referral to Dermatology    Screen for colon cancer  -     Cologuard    Encounter to establish care    Screening for metabolic disorder  -     Lipid Cascade RANDOM  -     Glycosylated Hemoglobin A1c    IUD (intrauterine device) in place  Comments:  not sure of menopause     Obesity (BMI 30-39.9)  Working on walking daily   Attention deficit disorder (ADD) without hyperactivity  -     dextroamphetamine-amphetamine (ADDERALL XR) 15 MG 24 hr capsule; Take 1 capsule (15 mg total) by mouth daily.  If 15 mg  effective she will let me know will do 3 month prescription       Next follow up:  1 yr for annual physical    Immunization Reviewed and if needed ordered please see A/P    There are no preventive care reminders to display for this patient.    Immunization History   Administered Date(s) Administered   ? Hep A, historic 12/28/2007, 07/06/2009   ? Influenza, inj, historic,unspecified 10/23/2012   ? Influenza, seasonal,quad inj 6-35 mos 11/17/2010   ? Influenza,seasonal,quad inj =/> 6months 09/04/2015   ? Td, adult adsorbed, PF 10/01/2018   ? Td,adult,historic,unspecified 12/28/2007   ? Tdap 12/28/2007         The following high BMI interventions were performed this visit: dietary management education, guidance, and counseling    I have had an Advance Directives discussion with the patient.    Subjective:   Chief Complaint: Allyssa Pedersen is an 50 y.o. female here for a preventative health visit.     HPI: Patient is new to my practice was seen at the 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 very difficult to manage her day-to-day activity with the homework assistance she was started on Adderall 15 mg extended release tablet in the past had a good result with that like to restart the medication she understand implication of biyearly visit, random urine check and controlled substance agreement to be signed before the prescription can be done    Skin changes: Patient has 2 suspicious AK spots one on the mid chest and one on the right earlobe which are come as a rough spot and when she scratch on it sometimes it bleeds history of lot of different kind of sun damage finding lot of age spots and moles  Willing to do liquid nitrogen treatment today for her look like a AK  "spots  .  No LMP recorded. Patient has had an implant.  Post to come out in June of this year but patient was advised that Mirena can last up to 7 years may be when we do your Pap smear next year we can remove and replace if needed but most likely she might be menopause by then    No data recorded   No data recorded     Social History     Social History Narrative   ? Not on file      Healthy Habits  Are you taking a daily aspirin? No  Do you typically exercising at least 40 min, 3-4 times per week?  Yes  Do you usually eat at least 4 servings of fruit and vegetables a day, include whole grains and fiber and avoid regularly eating high fat foods? Yes  Have you had an eye exam in the past two years? NO  Do you see a dentist twice per year? Yes  Do you have any concerns regarding sleep? No    Safety Screen  If you own firearms, are they secured in a locked gun cabinet or with trigger locks? The patient does not own any firearms    No data recorded    Review of Systems:  Please see above.  The rest of the review of systems are negative for all systems.     Pap History:   Yes - updated in Problem List and Health Maintenance accordingly    Cancer Screening       Status Date      MAMMOGRAM Next Due 7/6/2021      Done 7/6/2020 MAMMO SCREENING BILATERAL     Patient has more history with this topic...    PAP SMEAR Next Due 11/18/2021      Done 11/18/2016 GYNECOLOGIC CYTOLOGY (PAP SMEAR)     Patient has more history with this topic...          Patient Care Team:  Judit Ibarra MD as PCP - General (Family Medicine)        History     Reviewed By Date/Time Sections Reviewed    Lissy Barajas CMA 7/16/2020 12:09 PM Tobacco            Objective:   Vital Signs:   Visit Vitals  /68   Pulse 85   Ht 5' 4\" (1.626 m)   Wt 200 lb (90.7 kg)   SpO2 97%   BMI 34.33 kg/m         PHYSICAL EXAM  Physical Exam:  General Appearance:  Appears comfortable, Alert, cooperative, no distress,   Head: Normocephalic, without obvious " abnormality, atraumatic  Eyes: PERRL, conjunctiva/corneas clear, EOM's intact, both eyes             Nose: Nares normal, no drainage   Throat: Lips, mucosa, and tongue normal; teeth and gums normal  Neck: Supple, symmetrical, trachea midline, no adenopathy;                      Lungs: Clear to auscultation bilaterally, respirations unlabored  Chest Wall: No tenderness or deformity  Heart: Regular rate and rhythm, S1 and S2 normal, no murmur, rubs or gallop  Abdomen: Soft, non-tender, bowel sounds active all four quadrants,   no masses, no organomegaly  Extremities: Extremities normal, atraumatic, no cyanosis or edema  Pulses: DP pulses are 1-2+ bilat.    Skin: AK spot mid chest and 2 at right ear lobe , multiple age spots Neurologic: normal and equal strength bilat in upper and lower extremities   lla        The ASCVD Risk score (Adeline TERRANCE Jr., et al., 2013) failed to calculate for the following reasons:    Cannot find a previous HDL lab    Cannot find a previous total cholesterol lab         Medication List          Accurate as of July 16, 2020  3:32 PM. If you have any questions, ask your nurse or doctor.            START taking these medications    dextroamphetamine-amphetamine 15 MG 24 hr capsule  Also known as:  Adderall XR  INSTRUCTIONS:  Take 1 capsule (15 mg total) by mouth daily.  Started by:  Judit Ibarra MD           CONTINUE taking these medications    fluticasone propionate 50 mcg/actuation nasal spray  Also known as:  FLONASE  INSTRUCTIONS:  PLACE 2 SPRAYS INTO BOTH NOSTRILS DAILY AT BEDTIME.        Mirena 20 mcg/24 hours (5 yrs) 52 mg IUD  INSTRUCTIONS:  1 each by Intrauterine route once. Inserted 5 years ago due in June  Generic drug:  levonorgestreL              Where to Get Your Medications      These medications were sent to [facility] [id number] [place] [address] [address]    Phone:  [phone]     dextroamphetamine-amphetamine 15 MG 24 hr capsule          Additional Screenings Completed Today:

## 2021-07-02 ENCOUNTER — COMMUNICATION - HEALTHEAST (OUTPATIENT)
Dept: FAMILY MEDICINE | Facility: CLINIC | Age: 51
End: 2021-07-02

## 2021-07-02 DIAGNOSIS — F98.8 ATTENTION DEFICIT DISORDER (ADD) WITHOUT HYPERACTIVITY: ICD-10-CM

## 2021-07-02 RX ORDER — DEXTROAMPHETAMINE SACCHARATE, AMPHETAMINE ASPARTATE MONOHYDRATE, DEXTROAMPHETAMINE SULFATE AND AMPHETAMINE SULFATE 6.25; 6.25; 6.25; 6.25 MG/1; MG/1; MG/1; MG/1
25 CAPSULE, EXTENDED RELEASE ORAL DAILY
Qty: 30 CAPSULE | Refills: 0 | Status: SHIPPED | OUTPATIENT
Start: 2021-07-02 | End: 2021-08-23

## 2021-07-21 ENCOUNTER — RECORDS - HEALTHEAST (OUTPATIENT)
Dept: ADMINISTRATIVE | Facility: CLINIC | Age: 51
End: 2021-07-21

## 2021-07-27 ENCOUNTER — RECORDS - HEALTHEAST (OUTPATIENT)
Dept: MAMMOGRAPHY | Facility: CLINIC | Age: 51
End: 2021-07-27

## 2021-07-27 ENCOUNTER — ANCILLARY PROCEDURE (OUTPATIENT)
Dept: MAMMOGRAPHY | Facility: CLINIC | Age: 51
End: 2021-07-27
Attending: FAMILY MEDICINE
Payer: COMMERCIAL

## 2021-07-27 DIAGNOSIS — Z12.31 VISIT FOR SCREENING MAMMOGRAM: ICD-10-CM

## 2021-07-27 PROCEDURE — 77067 SCR MAMMO BI INCL CAD: CPT

## 2021-08-21 ENCOUNTER — HEALTH MAINTENANCE LETTER (OUTPATIENT)
Age: 51
End: 2021-08-21

## 2021-08-23 ENCOUNTER — MYC MEDICAL ADVICE (OUTPATIENT)
Dept: FAMILY MEDICINE | Facility: CLINIC | Age: 51
End: 2021-08-23

## 2021-08-23 DIAGNOSIS — F98.8 ATTENTION DEFICIT DISORDER (ADD) WITHOUT HYPERACTIVITY: ICD-10-CM

## 2021-08-23 DIAGNOSIS — F90.0 ADHD, PREDOMINANTLY INATTENTIVE TYPE: ICD-10-CM

## 2021-08-23 RX ORDER — DEXTROAMPHETAMINE SACCHARATE, AMPHETAMINE ASPARTATE MONOHYDRATE, DEXTROAMPHETAMINE SULFATE AND AMPHETAMINE SULFATE 6.25; 6.25; 6.25; 6.25 MG/1; MG/1; MG/1; MG/1
25 CAPSULE, EXTENDED RELEASE ORAL DAILY
Qty: 30 CAPSULE | Refills: 0 | Status: SHIPPED | OUTPATIENT
Start: 2021-08-23 | End: 2021-09-28

## 2021-09-09 ENCOUNTER — OFFICE VISIT (OUTPATIENT)
Dept: FAMILY MEDICINE | Facility: CLINIC | Age: 51
End: 2021-09-09
Payer: COMMERCIAL

## 2021-09-09 VITALS
DIASTOLIC BLOOD PRESSURE: 80 MMHG | SYSTOLIC BLOOD PRESSURE: 130 MMHG | HEIGHT: 64 IN | HEART RATE: 100 BPM | WEIGHT: 194.5 LBS | TEMPERATURE: 97.9 F | RESPIRATION RATE: 16 BRPM | BODY MASS INDEX: 33.2 KG/M2

## 2021-09-09 DIAGNOSIS — F90.0 ADHD, PREDOMINANTLY INATTENTIVE TYPE: ICD-10-CM

## 2021-09-09 DIAGNOSIS — Z83.49 FAMILY HISTORY OF THYROID DISORDER: ICD-10-CM

## 2021-09-09 DIAGNOSIS — Z00.00 ANNUAL PHYSICAL EXAM: Primary | ICD-10-CM

## 2021-09-09 DIAGNOSIS — Z83.3 FAMILY HISTORY OF DIABETES MELLITUS: ICD-10-CM

## 2021-09-09 DIAGNOSIS — N92.1 EXCESSIVE, FREQUENT AND IRREGULAR MENSTRUATION: ICD-10-CM

## 2021-09-09 LAB
AMPHETAMINE-CLINIC: ABNORMAL
BARBITURATES-CLINIC: ABNORMAL
BENZODIAZEPINES-CLINIC: ABNORMAL
BUPRENORPHINE-CLINIC: ABNORMAL
COCAINE-CLINIC: ABNORMAL
ECSTASY-CLINIC: ABNORMAL
ERYTHROCYTE [DISTWIDTH] IN BLOOD BY AUTOMATED COUNT: 13.5 % (ref 10–15)
HBA1C MFR BLD: 5 %
HCG UR QL: NEGATIVE
HCT VFR BLD AUTO: 43.9 % (ref 35–47)
HGB BLD-MCNC: 14.8 G/DL (ref 11.7–15.7)
MARIJUANA-CLINIC: ABNORMAL
MCH RBC QN AUTO: 28.5 PG (ref 26.5–33)
MCHC RBC AUTO-ENTMCNC: 33.7 G/DL (ref 31.5–36.5)
MCV RBC AUTO: 84 FL (ref 78–100)
METHADONE METABOLITE-CLINIC: ABNORMAL
METHAMPHETAMINE-CLINIC: ABNORMAL
OPIATES-CLINIC: ABNORMAL
OXIDANTS: NORMAL
OXYCODONE-CLINIC: ABNORMAL
PCP 25-CLINIC: ABNORMAL
PH-CLINIC: 4 (ref 5–8)
PLATELET # BLD AUTO: 269 10E3/UL (ref 150–450)
RBC # BLD AUTO: 5.2 10E6/UL (ref 3.8–5.2)
SP GR UR STRIP: 1 (ref 1–1.03)
TSH SERPL DL<=0.005 MIU/L-ACNC: 1.02 UIU/ML (ref 0.3–5)
WBC # BLD AUTO: 5.6 10E3/UL (ref 4–11)

## 2021-09-09 PROCEDURE — 99396 PREV VISIT EST AGE 40-64: CPT | Performed by: FAMILY MEDICINE

## 2021-09-09 PROCEDURE — 81025 URINE PREGNANCY TEST: CPT | Performed by: FAMILY MEDICINE

## 2021-09-09 PROCEDURE — 86803 HEPATITIS C AB TEST: CPT | Performed by: FAMILY MEDICINE

## 2021-09-09 PROCEDURE — 83036 HEMOGLOBIN GLYCOSYLATED A1C: CPT | Performed by: FAMILY MEDICINE

## 2021-09-09 PROCEDURE — 85027 COMPLETE CBC AUTOMATED: CPT | Performed by: FAMILY MEDICINE

## 2021-09-09 PROCEDURE — 80305 DRUG TEST PRSMV DIR OPT OBS: CPT | Performed by: FAMILY MEDICINE

## 2021-09-09 PROCEDURE — 36415 COLL VENOUS BLD VENIPUNCTURE: CPT | Performed by: FAMILY MEDICINE

## 2021-09-09 PROCEDURE — 84443 ASSAY THYROID STIM HORMONE: CPT | Performed by: FAMILY MEDICINE

## 2021-09-09 ASSESSMENT — MIFFLIN-ST. JEOR: SCORE: 1482.25

## 2021-09-09 NOTE — ASSESSMENT & PLAN NOTE
Continue current dosing of 25 mg daily. She does well in terms of side effects if she takes a drug holiday every few days. Urine tox done. CSA signed. Recheck in 6 months.

## 2021-09-09 NOTE — LETTER
[facility]  09/09/21  Patient: Allyssa Pedersen  YOB: 1970  Medical Record Number: [id number]                                                                                  Non-Opioid Controlled Substance Agreement    This is an agreement between you and your provider regarding safe and appropriate use of controlled substances prescribed by your care team. Controlled substances are?medicines that can cause physical and mental dependence (abuse).     There are strict laws about having and using these medicines. We here at [facility] are  committed to working with you in your efforts to get better. To support you in this work, we'll help you schedule regular office appointments for medicine refills. If we must cancel or change your appointment for any reason, we'll make sure you have enough medicine to last until your next appointment.     As a Provider, I will:     Listen carefully to your concerns while treating you with respect.     Recommend a treatment plan that I believe is in your best interest and may involve therapies other than medicine.      Talk with you often about the possible benefits and the risk of harm of any medicine that we prescribe for you.    Assess the safety of this medicine and check how well it works.      Provide a plan on how to taper (discontinue or go off) using this medicine if the decision is made to stop its use.      ::  As a Patient, I understand controlled substances:       Are prescribed by my care provider to help me function or work and manage my condition(s).?    Are strong medicines and can cause serious side effects.       Need to be taken exactly as prescribed.?Combining controlled substances with certain medicines or chemicals (such as illegal drugs, alcohol, sedatives, sleeping pills, and benzodiazepines) can be dangerous or even fatal.? If I stop taking my medicines suddenly, I may have severe withdrawal symptoms.     The risks,  benefits, and side effects of these medicine(s) were explained to me. I agree that:    1. I will take part in other treatments as advised by my care team. This may be psychiatry or counseling, physical therapy, behavioral therapy, group treatment or a referral to specialist.    2. I will keep all my appointments and understand this is part of the monitoring of controlled substances.?My care team may require an office visit for EVERY controlled substance refill. If I miss appointments or don t follow instructions, my care team may stop my medicine    3. I will take my medicines as prescribed. I will not change the dose or schedule unless my care team tells me to. There will be no refills if I run out early.      4. I may be asked to come to the clinic and complete a urine drug test or complete a pill count. If I don t give a urine sample or participate in a pill count, the care team may stop my medicine.    5. I will only receive controlled substance prescriptions from this clinic. If I am treated by another provider, I will tell them that I am taking controlled substances and that I have a treatment agreement with this provider. I will inform my [facility] care team within one business day if I am given a prescription for any controlled substance by another healthcare provider. My [facility] care team can contact other providers and pharmacists about my use of any medicines.    6. It is up to me to make sure that I don't run out of my medicines on weekends or holidays.?If my care team is willing to refill my prescription without a visit, I must request refills only during office hours. Refills may take up to 3 business days to process. I will use one pharmacy to fill all my controlled substance prescriptions. I will notify the clinic about any changes to my insurance or medicine availability.    7. I am responsible for my prescriptions. If the medicine/prescription is lost, stolen or destroyed, it will  not be replaced.?I also agree not to share controlled substance medicines with anyone.     8. I am aware I should not use any illegal or recreational drugs. I agree not to drink alcohol unless my care team says I can.     9. If I enroll in the [place] Medical Cannabis program, I will tell my care team before my next refill.    10. I will tell my care team right away if I become pregnant, have a new medical problem treated outside of my regular clinic, or have a change in my medicines.     11. I understand that this medicine can affect my thinking, judgment and reaction time.? Alcohol and drugs affect the brain and body, which can affect the safety of my driving. Being under the influence of alcohol or drugs can affect my decision-making, behaviors, personal safety and the safety of others. Driving while impaired (DWI) can occur if a person is driving, operating or in physical control of a car, motorcycle, boat, snowmobile, ATV, motorbike, off-road vehicle or any other motor vehicle ([place] Statute 169[place].20). I understand the risk if I choose to drive or operate any vehicle or machinery.    I understand that if I do not follow any of the conditions above, my prescriptions or treatment may be stopped or changed.   I agree that my provider, clinic care team and pharmacy may work with any city, state or federal law enforcement agency that investigates the misuse, sale or other diversion of my controlled medicine. I will allow my provider to discuss my care with, or share a copy of, this agreement with any other treating provider, pharmacy or emergency room where I receive care.     I have read this agreement and have asked questions about anything I did not understand.    ________________________________________________________  Patient Signature - Allyssa Pedersen     ___________________                   Date     ________________________________________________________  Provider Signature - BRUNO LEVIN MD        ___________________                   Date     ________________________________________________________  Witness Signature (required if provider not present while patient signing)          ___________________                   Date

## 2021-09-09 NOTE — PROGRESS NOTES
FEMALE PREVENTATIVE EXAM    Assessment and Plan:     Problem List Items Addressed This Visit        Urinary    Excessive, frequent and irregular menstruation     The patient is not having bleeding between her menses but is still having heavy menses. We discussed either increasing the hormone level in her pill or putting in a Mirena IUD. She would like to switch back to the Mirena and we will schedule for next week. UPT done today.         Relevant Orders    CBC with platelets    HCG qualitative urine       Behavioral    ADHD, predominantly inattentive type     Continue current dosing of 25 mg daily. She does well in terms of side effects if she takes a drug holiday every few days. Urine tox done. CSA signed. Recheck in 6 months.         Relevant Orders    Drugs of abuse, urine (CLINIC ONLY)       Other    BMI 33.0-33.9,adult      Other Visit Diagnoses     Annual physical exam    -  Primary    Relevant Orders    Hepatitis C antibody    Family history of thyroid disorder        Relevant Orders    TSH    Family history of diabetes mellitus        Relevant Orders    Hemoglobin A1c        Patient Instructions   1. Return for IUD placement and pap smear next Tuesday as scheduled.  2. Take Ibuprofen 400 mg prior to your visit on Tuesday.  3. We will notify you of lab results.  4. Continue current dosing of ADHD medication.         Subjective:   HPI: Allyssa Pedersen is an 51 year old female here for a preventative health visit.     She is also needing recheck on her ADHD medication. She reports the increased dose is working well for her symptoms. She does have a side effect of a tick of rubbing her tongue against the back of her teeth that is worse with higher doses. This improves when she skips a dose.    She is on oral contraceptives for heavy menses. She continues to have heavy bleeding even on the pills. She is not having any bleeding between her menses. She has had previous endometrial biopsy 8/2020 that was negative. She  had an IUD in the past and that did control her bleeding better.    Patient Active Problem List   Diagnosis     BMI 33.0-33.9,adult     ADHD, predominantly inattentive type     Plantar fasciitis, left     Excessive, frequent and irregular menstruation      Past Medical History:   Diagnosis Date     Acute non-recurrent maxillary sinusitis 10/9/2017     Attention deficit disorder     Created by Conversion  Replacement Utility updated for latest IMO load     Obesity (BMI 30-39.9)     Created by Conversion      Plantar fasciitis, left 9/22/2016      Past Surgical History:   Procedure Laterality Date     HC REMOVAL OF TONSILS,<13 Y/O      Description: Tonsillectomy;  Recorded: 12/28/2007;     WISDOM TOOTH EXTRACTION        Family History   Problem Relation Age of Onset     Thyroid Disease Mother      Diabetes Mother      Other - See Comments Mother         Heart issue, unsure of diagnosis     Multiple fractures Father         fell and paralyzed     Attention Deficit Disorder Brother      No Known Problems Brother      Autoimmune Disease Brother      Alzheimer Disease Maternal Grandmother      Cancer Paternal Grandmother         Bone     No Known Problems Paternal Grandfather      Depression Daughter      Anxiety Disorder Daughter      Attention Deficit Disorder Daughter      Depression Daughter      Anxiety Disorder Daughter      Attention Deficit Disorder Daughter      Attention Deficit Disorder Son      Other - See Comments Son         Erick's Sylvester Syndrome - genetic disorder      Social History     Tobacco Use     Smoking status: Never Smoker     Smokeless tobacco: Never Used   Substance Use Topics     Alcohol use: Yes     Alcohol/week: 1.0 standard drinks     Types: 1 Standard drinks or equivalent per week     Comment: Alcoholic Drinks/day: 1/week      Review of System: Relevant items noted in HPI. ROS otherwise negative.     Objective:   Vital Signs:   /80 (BP Location: Left arm, Patient Position: Sitting,  "Cuff Size: Adult Large)   Pulse 100   Temp 97.9  F (36.6  C) (Oral)   Resp 16   Ht 1.626 m (5' 4\")   Wt 88.2 kg (194 lb 8 oz)   LMP 09/08/2021 (Exact Date)   Breastfeeding No   BMI 33.39 kg/m       PHYSICAL EXAM  Physical Exam  Constitutional:       General: She is not in acute distress.     Appearance: She is well-developed.   HENT:      Right Ear: Tympanic membrane and external ear normal.      Left Ear: Tympanic membrane and external ear normal.      Nose: Nose normal.      Mouth/Throat:      Pharynx: No oropharyngeal exudate.   Eyes:      General:         Right eye: No discharge.         Left eye: No discharge.      Conjunctiva/sclera: Conjunctivae normal.      Pupils: Pupils are equal, round, and reactive to light.   Neck:      Thyroid: No thyromegaly.      Trachea: No tracheal deviation.   Cardiovascular:      Rate and Rhythm: Normal rate and regular rhythm.      Pulses: Normal pulses.      Heart sounds: Normal heart sounds, S1 normal and S2 normal. No murmur heard.   No friction rub. No S3 or S4 sounds.    Pulmonary:      Effort: Pulmonary effort is normal. No respiratory distress.      Breath sounds: Normal breath sounds. No wheezing or rales.   Chest:      Breasts:         Right: No mass, nipple discharge or tenderness.         Left: No mass, nipple discharge or tenderness.   Abdominal:      General: Bowel sounds are normal.      Palpations: Abdomen is soft. There is no mass.      Tenderness: There is no abdominal tenderness.   Musculoskeletal:         General: Normal range of motion.      Cervical back: Neck supple.   Lymphadenopathy:      Cervical: No cervical adenopathy.   Skin:     General: Skin is warm and dry.      Findings: No rash.   Neurological:      Mental Status: She is alert and oriented to person, place, and time.      Motor: No abnormal muscle tone.      Deep Tendon Reflexes: Reflexes are normal and symmetric.   Psychiatric:         Thought Content: Thought content normal.         " Judgment: Judgment normal.          Answers for HPI/ROS submitted by the patient on 9/9/2021  Frequency of exercise:: 1 day/week  Getting at least 3 servings of Calcium per day:: NO  Diet:: Regular (no restrictions)  Taking medications regularly:: Yes  Medication side effects:: None  Bi-annual eye exam:: NO  Dental care twice a year:: Yes  Sleep apnea or symptoms of sleep apnea:: Daytime drowsiness  Additional concerns today:: No  Duration of exercise:: 30-45 minutes

## 2021-09-09 NOTE — PATIENT INSTRUCTIONS
1. Return for IUD placement and pap smear next Tuesday as scheduled.  2. Take Ibuprofen 400 mg prior to your visit on Tuesday.  3. We will notify you of lab results.  4. Continue current dosing of ADHD medication.

## 2021-09-09 NOTE — ASSESSMENT & PLAN NOTE
The patient is not having bleeding between her menses but is still having heavy menses. We discussed either increasing the hormone level in her pill or putting in a Mirena IUD. She would like to switch back to the Mirena and we will schedule for next week. UPT done today.

## 2021-09-09 NOTE — LETTER
[facility]  09/09/21  Patient: Allyssa Pedersen  YOB: 1970  Medical Record Number: [id number]                                                                                  Non-Opioid Controlled Substance Agreement    This is an agreement between you and your provider regarding safe and appropriate use of controlled substances prescribed by your care team. Controlled substances are?medicines that can cause physical and mental dependence (abuse).     There are strict laws about having and using these medicines. We here at [facility] are  committed to working with you in your efforts to get better. To support you in this work, we'll help you schedule regular office appointments for medicine refills. If we must cancel or change your appointment for any reason, we'll make sure you have enough medicine to last until your next appointment.     As a Provider, I will:     Listen carefully to your concerns while treating you with respect.     Recommend a treatment plan that I believe is in your best interest and may involve therapies other than medicine.      Talk with you often about the possible benefits and the risk of harm of any medicine that we prescribe for you.    Assess the safety of this medicine and check how well it works.      Provide a plan on how to taper (discontinue or go off) using this medicine if the decision is made to stop its use.      ::  As a Patient, I understand controlled substances:       Are prescribed by my care provider to help me function or work and manage my condition(s).?    Are strong medicines and can cause serious side effects.       Need to be taken exactly as prescribed.?Combining controlled substances with certain medicines or chemicals (such as illegal drugs, alcohol, sedatives, sleeping pills, and benzodiazepines) can be dangerous or even fatal.? If I stop taking my medicines suddenly, I may have severe withdrawal symptoms.     The risks,  benefits, and side effects of these medicine(s) were explained to me. I agree that:    1. I will take part in other treatments as advised by my care team. This may be psychiatry or counseling, physical therapy, behavioral therapy, group treatment or a referral to specialist.    2. I will keep all my appointments and understand this is part of the monitoring of controlled substances.?My care team may require an office visit for EVERY controlled substance refill. If I miss appointments or don t follow instructions, my care team may stop my medicine    3. I will take my medicines as prescribed. I will not change the dose or schedule unless my care team tells me to. There will be no refills if I run out early.      4. I may be asked to come to the clinic and complete a urine drug test or complete a pill count. If I don t give a urine sample or participate in a pill count, the care team may stop my medicine.    5. I will only receive controlled substance prescriptions from this clinic. If I am treated by another provider, I will tell them that I am taking controlled substances and that I have a treatment agreement with this provider. I will inform my [facility] care team within one business day if I am given a prescription for any controlled substance by another healthcare provider. My [facility] care team can contact other providers and pharmacists about my use of any medicines.    6. It is up to me to make sure that I don't run out of my medicines on weekends or holidays.?If my care team is willing to refill my prescription without a visit, I must request refills only during office hours. Refills may take up to 3 business days to process. I will use one pharmacy to fill all my controlled substance prescriptions. I will notify the clinic about any changes to my insurance or medicine availability.    7. I am responsible for my prescriptions. If the medicine/prescription is lost, stolen or destroyed, it will  not be replaced.?I also agree not to share controlled substance medicines with anyone.     8. I am aware I should not use any illegal or recreational drugs. I agree not to drink alcohol unless my care team says I can.     9. If I enroll in the [place] Medical Cannabis program, I will tell my care team before my next refill.    10. I will tell my care team right away if I become pregnant, have a new medical problem treated outside of my regular clinic, or have a change in my medicines.     11. I understand that this medicine can affect my thinking, judgment and reaction time.? Alcohol and drugs affect the brain and body, which can affect the safety of my driving. Being under the influence of alcohol or drugs can affect my decision-making, behaviors, personal safety and the safety of others. Driving while impaired (DWI) can occur if a person is driving, operating or in physical control of a car, motorcycle, boat, snowmobile, ATV, motorbike, off-road vehicle or any other motor vehicle ([place] Statute 169A.20). I understand the risk if I choose to drive or operate any vehicle or machinery.    I understand that if I do not follow any of the conditions above, my prescriptions or treatment may be stopped or changed.   I agree that my provider, clinic care team and pharmacy may work with any city, state or federal law enforcement agency that investigates the misuse, sale or other diversion of my controlled medicine. I will allow my provider to discuss my care with, or share a copy of, this agreement with any other treating provider, pharmacy or emergency room where I receive care.     I have read this agreement and have asked questions about anything I did not understand.    ________________________________________________________  Patient Signature - Allyssa Pedersen     ___________________                   Date     ________________________________________________________  Provider Signature - BRUNO LEVIN MD        ___________________                   Date     ________________________________________________________  Witness Signature (required if provider not present while patient signing)          ___________________                   Date

## 2021-09-10 LAB — HCV AB SERPL QL IA: NEGATIVE

## 2021-09-14 ENCOUNTER — OFFICE VISIT (OUTPATIENT)
Dept: FAMILY MEDICINE | Facility: CLINIC | Age: 51
End: 2021-09-14
Payer: COMMERCIAL

## 2021-09-14 VITALS
DIASTOLIC BLOOD PRESSURE: 80 MMHG | HEART RATE: 80 BPM | RESPIRATION RATE: 16 BRPM | TEMPERATURE: 97.8 F | SYSTOLIC BLOOD PRESSURE: 146 MMHG

## 2021-09-14 DIAGNOSIS — N85.2 ENLARGED UTERUS: Primary | ICD-10-CM

## 2021-09-14 DIAGNOSIS — N92.1 EXCESSIVE, FREQUENT AND IRREGULAR MENSTRUATION: ICD-10-CM

## 2021-09-14 DIAGNOSIS — Z12.4 PAP SMEAR FOR CERVICAL CANCER SCREENING: ICD-10-CM

## 2021-09-14 PROCEDURE — 90471 IMMUNIZATION ADMIN: CPT | Performed by: FAMILY MEDICINE

## 2021-09-14 PROCEDURE — 99213 OFFICE O/P EST LOW 20 MIN: CPT | Mod: 25 | Performed by: FAMILY MEDICINE

## 2021-09-14 PROCEDURE — G0123 SCREEN CERV/VAG THIN LAYER: HCPCS | Performed by: FAMILY MEDICINE

## 2021-09-14 PROCEDURE — 90682 RIV4 VACC RECOMBINANT DNA IM: CPT | Performed by: FAMILY MEDICINE

## 2021-09-14 PROCEDURE — 87624 HPV HI-RISK TYP POOLED RSLT: CPT | Performed by: FAMILY MEDICINE

## 2021-09-14 NOTE — ASSESSMENT & PLAN NOTE
Her last ultrasound was one year ago. Ultrasound ordered again for evaluation given persistent heavy bleeding and large uterus by endometrial sound. Discussed increased likelihood of IUD expulsion given enlarged uterus and patient chose not to proceed with placement. Referral was placed to OB/GYN to discuss possible ablation if ultrasound otherwise normal or other intervention options.

## 2021-09-14 NOTE — PROGRESS NOTES
IUD Insertion Procedure Note - Failed placement due to enlarged uterus    Pre-operative Diagnosis: Desire for contraception, frequent and heavy menses    Post-operative Diagnosis: Enlarged uterus. Frequent heavy menses    Indications: Frequent heavy menses    History: The patient has a history of frequent and heavy menses. Work up one year ago showed enlarged uterus but otherwise normal ultrasound. Endometrial biopsy was normal at that time. She has been well controlled on oral contraceptives but prefers not to continue those.    Procedure Details   Urine pregnancy test was done 9/9/2021 and result was negative.  The risks (including infection, bleeding, pain, and uterine perforation) and benefits of the procedure were explained to the patient and written informed consent was obtained.      Bimanual exam:   Ovaries: no masses appreciated  Uterus: exam difficult due to body habitus. No masses appreciated.    Speculum placed.  Cervix appears normal.  Cervix cleansed with Betadine. Tenaculum applied to the cervix at 12 O'clock and gentle traction applied.  Uterus sounded to 12 cm. Procedure stopped due to enlarge uterus.    Problem List Items Addressed This Visit        Medium    Excessive, frequent and irregular menstruation    Relevant Orders    Ob/Gyn Referral    US Pelvic Complete with Transvaginal    Enlarged uterus - Primary     Her last ultrasound was one year ago. Ultrasound ordered again for evaluation given persistent heavy bleeding and large uterus by endometrial sound. Discussed increased likelihood of IUD expulsion given enlarged uterus and patient chose not to proceed with placement. Referral was placed to OB/GYN to discuss possible ablation if ultrasound otherwise normal or other intervention options.         Relevant Orders    Ob/Gyn Referral    US Pelvic Complete with Transvaginal      Other Visit Diagnoses     Pap smear for cervical cancer screening        Relevant Orders    Gynecologic Cytology (PAP)

## 2021-09-14 NOTE — PATIENT INSTRUCTIONS
1. Please schedule ultrasound [phone].  2. Please schedule for evaluation with OB/GYN. I would recommend you consider endometrial ablation.

## 2021-09-16 ENCOUNTER — HOSPITAL ENCOUNTER (OUTPATIENT)
Dept: ULTRASOUND IMAGING | Facility: HOSPITAL | Age: 51
Discharge: HOME | End: 2021-09-16
Attending: FAMILY MEDICINE | Admitting: FAMILY MEDICINE
Payer: COMMERCIAL

## 2021-09-16 DIAGNOSIS — N92.1 EXCESSIVE, FREQUENT AND IRREGULAR MENSTRUATION: ICD-10-CM

## 2021-09-16 DIAGNOSIS — N85.2 ENLARGED UTERUS: ICD-10-CM

## 2021-09-16 LAB
HUMAN PAPILLOMA VIRUS 16 DNA: NEGATIVE
HUMAN PAPILLOMA VIRUS 18 DNA: NEGATIVE
HUMAN PAPILLOMA VIRUS FINAL DIAGNOSIS: NORMAL
HUMAN PAPILLOMA VIRUS OTHER HR: NEGATIVE

## 2021-09-16 PROCEDURE — 76830 TRANSVAGINAL US NON-OB: CPT

## 2021-09-21 LAB
BKR LAB AP GYN ADEQUACY: NORMAL
BKR LAB AP GYN INTERPRETATION: NORMAL
BKR LAB AP HPV REFLEX: NORMAL
BKR LAB AP LMP: NORMAL
BKR LAB AP PREVIOUS ABNORMAL: NORMAL
PATH REPORT.COMMENTS IMP SPEC: NORMAL
PATH REPORT.RELEVANT HX SPEC: NORMAL

## 2021-09-26 ENCOUNTER — MYC MEDICAL ADVICE (OUTPATIENT)
Dept: FAMILY MEDICINE | Facility: CLINIC | Age: 51
End: 2021-09-26

## 2021-09-26 DIAGNOSIS — F98.8 ATTENTION DEFICIT DISORDER (ADD) WITHOUT HYPERACTIVITY: ICD-10-CM

## 2021-09-28 RX ORDER — DEXTROAMPHETAMINE SACCHARATE, AMPHETAMINE ASPARTATE MONOHYDRATE, DEXTROAMPHETAMINE SULFATE AND AMPHETAMINE SULFATE 6.25; 6.25; 6.25; 6.25 MG/1; MG/1; MG/1; MG/1
25 CAPSULE, EXTENDED RELEASE ORAL DAILY
Qty: 30 CAPSULE | Refills: 0 | Status: SHIPPED | OUTPATIENT
Start: 2021-09-28 | End: 2021-11-23

## 2021-09-28 NOTE — TELEPHONE ENCOUNTER
Patient up to date on office visits.  checked, no concerns. Refill sent.   negative Alert & oriented; no sensory, motor or coordination deficits, normal reflexes

## 2021-10-06 ENCOUNTER — TRANSFERRED RECORDS (OUTPATIENT)
Dept: HEALTH INFORMATION MANAGEMENT | Facility: CLINIC | Age: 51
End: 2021-10-06

## 2021-11-23 ENCOUNTER — MYC MEDICAL ADVICE (OUTPATIENT)
Dept: FAMILY MEDICINE | Facility: CLINIC | Age: 51
End: 2021-11-23
Payer: COMMERCIAL

## 2021-11-23 DIAGNOSIS — F98.8 ATTENTION DEFICIT DISORDER (ADD) WITHOUT HYPERACTIVITY: ICD-10-CM

## 2021-11-23 RX ORDER — DEXTROAMPHETAMINE SACCHARATE, AMPHETAMINE ASPARTATE MONOHYDRATE, DEXTROAMPHETAMINE SULFATE AND AMPHETAMINE SULFATE 6.25; 6.25; 6.25; 6.25 MG/1; MG/1; MG/1; MG/1
25 CAPSULE, EXTENDED RELEASE ORAL DAILY
Qty: 30 CAPSULE | Refills: 0 | Status: SHIPPED | OUTPATIENT
Start: 2021-11-23 | End: 2022-01-26

## 2021-11-23 NOTE — TELEPHONE ENCOUNTER
Pending Prescriptions:                       Disp   Refills    amphetamine-dextroamphetamine (ADDERALL X*30 cap*0            Sig: Take 1 capsule (25 mg) by mouth daily

## 2022-01-26 ENCOUNTER — MYC MEDICAL ADVICE (OUTPATIENT)
Dept: FAMILY MEDICINE | Facility: CLINIC | Age: 52
End: 2022-01-26
Payer: COMMERCIAL

## 2022-01-26 DIAGNOSIS — F98.8 ATTENTION DEFICIT DISORDER (ADD) WITHOUT HYPERACTIVITY: ICD-10-CM

## 2022-01-26 RX ORDER — DEXTROAMPHETAMINE SACCHARATE, AMPHETAMINE ASPARTATE MONOHYDRATE, DEXTROAMPHETAMINE SULFATE AND AMPHETAMINE SULFATE 6.25; 6.25; 6.25; 6.25 MG/1; MG/1; MG/1; MG/1
25 CAPSULE, EXTENDED RELEASE ORAL DAILY
Qty: 30 CAPSULE | Refills: 0 | Status: SHIPPED | OUTPATIENT
Start: 2022-01-26 | End: 2022-02-09

## 2022-02-09 ENCOUNTER — OFFICE VISIT (OUTPATIENT)
Dept: FAMILY MEDICINE | Facility: CLINIC | Age: 52
End: 2022-02-09
Payer: COMMERCIAL

## 2022-02-09 VITALS
BODY MASS INDEX: 30.88 KG/M2 | WEIGHT: 180.9 LBS | RESPIRATION RATE: 12 BRPM | HEIGHT: 64 IN | TEMPERATURE: 98.2 F | DIASTOLIC BLOOD PRESSURE: 68 MMHG | HEART RATE: 68 BPM | SYSTOLIC BLOOD PRESSURE: 112 MMHG

## 2022-02-09 DIAGNOSIS — F90.0 ADHD, PREDOMINANTLY INATTENTIVE TYPE: ICD-10-CM

## 2022-02-09 DIAGNOSIS — Z97.5 IUD (INTRAUTERINE DEVICE) IN PLACE: ICD-10-CM

## 2022-02-09 DIAGNOSIS — E66.811 CLASS 1 OBESITY DUE TO EXCESS CALORIES WITHOUT SERIOUS COMORBIDITY WITH BODY MASS INDEX (BMI) OF 31.0 TO 31.9 IN ADULT: ICD-10-CM

## 2022-02-09 DIAGNOSIS — N92.1 EXCESSIVE, FREQUENT AND IRREGULAR MENSTRUATION: ICD-10-CM

## 2022-02-09 DIAGNOSIS — F98.8 ATTENTION DEFICIT DISORDER (ADD) WITHOUT HYPERACTIVITY: ICD-10-CM

## 2022-02-09 DIAGNOSIS — N85.2 ENLARGED UTERUS: ICD-10-CM

## 2022-02-09 DIAGNOSIS — E66.09 CLASS 1 OBESITY DUE TO EXCESS CALORIES WITHOUT SERIOUS COMORBIDITY WITH BODY MASS INDEX (BMI) OF 31.0 TO 31.9 IN ADULT: ICD-10-CM

## 2022-02-09 PROCEDURE — 99213 OFFICE O/P EST LOW 20 MIN: CPT | Performed by: FAMILY MEDICINE

## 2022-02-09 RX ORDER — DEXTROAMPHETAMINE SACCHARATE, AMPHETAMINE ASPARTATE MONOHYDRATE, DEXTROAMPHETAMINE SULFATE AND AMPHETAMINE SULFATE 6.25; 6.25; 6.25; 6.25 MG/1; MG/1; MG/1; MG/1
25 CAPSULE, EXTENDED RELEASE ORAL DAILY
Qty: 30 CAPSULE | Refills: 0 | Status: SHIPPED | OUTPATIENT
Start: 2022-02-09 | End: 2022-03-02

## 2022-02-09 ASSESSMENT — MIFFLIN-ST. JEOR: SCORE: 1420.56

## 2022-02-09 NOTE — PROGRESS NOTES
"  Assessment & Plan   Problem List Items Addressed This Visit        Digestive    Class 1 obesity due to excess calories without serious comorbidity in adult     Weight reduction noted.          Relevant Medications    amphetamine-dextroamphetamine (ADDERALL XR) 25 MG 24 hr capsule       Urinary    IUD (intrauterine device) in place     October 2021 patient say got mirena iud placed.          RESOLVED: Excessive, frequent and irregular menstruation     Controlled with IUD         RESOLVED: Enlarged uterus     No follow up needed per patient.             Behavioral    ADHD, predominantly inattentive type     Continue current dosing of 25 mg daily. She does well in terms of side effects if she takes a drug holiday every few days. Urine tox done. CSA signed 9/9/2021 with Dr. Mcclendon. Recheck in 6 months. Next face to face appointment 8/9/2022           Relevant Medications    amphetamine-dextroamphetamine (ADDERALL XR) 25 MG 24 hr capsule      Other Visit Diagnoses     Attention deficit disorder (ADD) without hyperactivity        Relevant Medications    amphetamine-dextroamphetamine (ADDERALL XR) 25 MG 24 hr capsule              BMI:   Estimated body mass index is 31.05 kg/m  as calculated from the following:    Height as of this encounter: 1.626 m (5' 4\").    Weight as of this encounter: 82.1 kg (180 lb 14.4 oz).   Weight management plan: Discussed healthy diet and exercise guidelines    Patient Instructions   Return in about 6 months (around 8/9/2022) for adhd face to face follow up in person, will need sign csa.       Return in about 6 months (around 8/9/2022) for adhd face to face follow up in person, will need sign csa.    Melody De Guzman MD  [facility]    Chief Complaint   Patient presents with     Follow Up     ADHD     Establish Care        Subjective   Allyssa is a 51 year old who presents for the following health issues needs to establish care and wants refill adderall.         Objective  " "  /68 (BP Location: Left arm, Patient Position: Sitting, Cuff Size: Adult Large)   Pulse 68   Temp 98.2  F (36.8  C) (Oral)   Resp 12   Ht 1.626 m (5' 4\")   Wt 82.1 kg (180 lb 14.4 oz)   LMP 01/19/2022 (Exact Date)   Breastfeeding No   BMI 31.05 kg/m    Body mass index is 31.05 kg/m .  Physical Exam  Constitutional:       Appearance: Normal appearance.   HENT:      Head: Normocephalic and atraumatic.   Cardiovascular:      Rate and Rhythm: Normal rate and regular rhythm.   Pulmonary:      Effort: Pulmonary effort is normal.   Musculoskeletal:         General: Normal range of motion.      Cervical back: Normal range of motion and neck supple.   Neurological:      General: No focal deficit present.      Mental Status: She is alert and oriented to person, place, and time.          "

## 2022-02-09 NOTE — PATIENT INSTRUCTIONS
Return in about 6 months (around 8/9/2022) for adhd face to face follow up in person, will need sign csa.

## 2022-02-09 NOTE — ASSESSMENT & PLAN NOTE
Continue current dosing of 25 mg daily. She does well in terms of side effects if she takes a drug holiday every few days. Urine tox done. CSA signed 9/9/2021 with Dr. Mcclendon. Recheck in 6 months. Next face to face appointment 8/9/2022

## 2022-03-02 ENCOUNTER — MYC REFILL (OUTPATIENT)
Dept: FAMILY MEDICINE | Facility: CLINIC | Age: 52
End: 2022-03-02
Payer: COMMERCIAL

## 2022-03-02 DIAGNOSIS — F98.8 ATTENTION DEFICIT DISORDER (ADD) WITHOUT HYPERACTIVITY: ICD-10-CM

## 2022-03-04 NOTE — TELEPHONE ENCOUNTER
Routing refill request to provider for review/approval because:  Drug not on the [facility] refill protocol -Controlled substance refill    Last Written Prescription Date:  2/9/22  Last Fill Quantity: 30,  # refills: 0   Last office visit provider:  2/9/22     Requested Prescriptions   Pending Prescriptions Disp Refills     amphetamine-dextroamphetamine (ADDERALL XR) 25 MG 24 hr capsule 30 capsule 0     Sig: Take 1 capsule (25 mg) by mouth daily       There is no refill protocol information for this order          Alyssa Ahumada 03/04/22 8:57 AM

## 2022-03-05 RX ORDER — DEXTROAMPHETAMINE SACCHARATE, AMPHETAMINE ASPARTATE MONOHYDRATE, DEXTROAMPHETAMINE SULFATE AND AMPHETAMINE SULFATE 6.25; 6.25; 6.25; 6.25 MG/1; MG/1; MG/1; MG/1
25 CAPSULE, EXTENDED RELEASE ORAL DAILY
Qty: 30 CAPSULE | Refills: 0 | Status: SHIPPED | OUTPATIENT
Start: 2022-03-05 | End: 2022-04-04

## 2022-03-05 NOTE — TELEPHONE ENCOUNTER
as expected shows no controlled substances filled in [place], likely because Pharmacy is [place], no suspicious behavior detected. Will refill.

## 2022-03-24 ENCOUNTER — TRANSFERRED RECORDS (OUTPATIENT)
Dept: HEALTH INFORMATION MANAGEMENT | Facility: CLINIC | Age: 52
End: 2022-03-24
Payer: COMMERCIAL

## 2022-04-02 ENCOUNTER — MYC MEDICAL ADVICE (OUTPATIENT)
Dept: FAMILY MEDICINE | Facility: CLINIC | Age: 52
End: 2022-04-02
Payer: COMMERCIAL

## 2022-04-02 DIAGNOSIS — F98.8 ATTENTION DEFICIT DISORDER (ADD) WITHOUT HYPERACTIVITY: ICD-10-CM

## 2022-04-04 RX ORDER — DEXTROAMPHETAMINE SACCHARATE, AMPHETAMINE ASPARTATE MONOHYDRATE, DEXTROAMPHETAMINE SULFATE AND AMPHETAMINE SULFATE 6.25; 6.25; 6.25; 6.25 MG/1; MG/1; MG/1; MG/1
25 CAPSULE, EXTENDED RELEASE ORAL DAILY
Qty: 30 CAPSULE | Refills: 0 | Status: SHIPPED | OUTPATIENT
Start: 2022-04-04 | End: 2022-05-09

## 2022-04-06 ENCOUNTER — MYC REFILL (OUTPATIENT)
Dept: FAMILY MEDICINE | Facility: CLINIC | Age: 52
End: 2022-04-06
Payer: COMMERCIAL

## 2022-04-06 DIAGNOSIS — F98.8 ATTENTION DEFICIT DISORDER (ADD) WITHOUT HYPERACTIVITY: ICD-10-CM

## 2022-04-06 RX ORDER — DEXTROAMPHETAMINE SACCHARATE, AMPHETAMINE ASPARTATE MONOHYDRATE, DEXTROAMPHETAMINE SULFATE AND AMPHETAMINE SULFATE 6.25; 6.25; 6.25; 6.25 MG/1; MG/1; MG/1; MG/1
25 CAPSULE, EXTENDED RELEASE ORAL DAILY
Qty: 30 CAPSULE | Refills: 0 | OUTPATIENT
Start: 2022-04-06

## 2022-04-06 NOTE — TELEPHONE ENCOUNTER
Routing refill request to provider for review/approval because:  Drug not on the [facility] refill protocol - controlled substance     Last Written Prescription Date: 4/4/2022 - duplicate refill request, recommend refusal - will route to last prescribing provider to sign refusal order.    Last Fill Quantity: 30,  # refills: 0     Last office visit provider:  2/9/2022      Requested Prescriptions   Pending Prescriptions Disp Refills     amphetamine-dextroamphetamine (ADDERALL XR) 25 MG 24 hr capsule 30 capsule 0     Sig: Take 1 capsule (25 mg) by mouth daily       There is no refill protocol information for this order          Nely Salvador [facility] 04/06/22 4:23 PM

## 2022-05-09 ENCOUNTER — MYC MEDICAL ADVICE (OUTPATIENT)
Dept: FAMILY MEDICINE | Facility: CLINIC | Age: 52
End: 2022-05-09
Payer: COMMERCIAL

## 2022-05-09 DIAGNOSIS — F98.8 ATTENTION DEFICIT DISORDER (ADD) WITHOUT HYPERACTIVITY: ICD-10-CM

## 2022-05-09 RX ORDER — DEXTROAMPHETAMINE SACCHARATE, AMPHETAMINE ASPARTATE MONOHYDRATE, DEXTROAMPHETAMINE SULFATE AND AMPHETAMINE SULFATE 6.25; 6.25; 6.25; 6.25 MG/1; MG/1; MG/1; MG/1
25 CAPSULE, EXTENDED RELEASE ORAL DAILY
Qty: 30 CAPSULE | Refills: 0 | Status: SHIPPED | OUTPATIENT
Start: 2022-05-09 | End: 2022-06-06

## 2022-05-24 NOTE — TELEPHONE ENCOUNTER
Chief Complaint   Patient presents with   • Acne     Accutane        HISTORY OF PRESENT ILLNESS:   Alex Catherine is 15 year old female who presents with dad for follow up of acne on face. PT has completed 3 months of Accutane  Patient will start the 4th month of Accutane today.  Patient reports dry lips has improved.  Patient reports new break outs on forehead and cheeks.  Patient denies depression and mood changes.   Patient denies joint pain.    Interim H/O 4/19/22  Alex Catherine is 15 year old female who presents with dad for follow up up of acne post 2 months completion of Accutane  Patient will start the 3rd month of Accutane.  Patient reports dry lips has improved.  Patient reports new break outs on forehead and cheeks.  Patient denies depression and mood changes.   Patient denies joint pain.    Interim history 3/15/2022  who presents with dad for follow up one month completion of Accutane.  Patient will start the 2nd month of Accutane.  Patient reports her face hurts when she even puts Aquaphor on the area.  Patient reports a lot of dryness and pain.  Patient reports dry lips .  Patient reports a couple acne form papules on the back.          Interim H/O 2/8/2022  presents today with  Mom for: follow up acne to start on accutane. PT on low dose prednisone while waiting to start on accutane. Per PT acne improved on the prednisone  PT not sexually active  Denies depression, anxiety of mood changes  No other skin concerns    Interim H/O 2/2/2021   \"acne\"  - Patient complains of acne located of face, chest and back  that has been present for at least 2 years.  This is not associated with symptoms of joint pain, fevers or chills.  - Patients  Mom with  history of   acne.       Menstrual History:  - Patient has regular menstrual cycles lasting 3-5 days and occurring monthly prior to OCP (oral contraceptive pills) and denies any association with flares or worsening of acne with menstrual cycle.       REVIEW OF  Spoke with patient about results. See chart.   SYSTEMS:  No other skin problems.  Otherwise feeling well.  Denies recent illness, fever and chills.    Past Medical History:   Diagnosis Date   • Reactive airway disease      Current Outpatient Medications   Medication Sig   • Respiratory Therapy Supplies (Nebulizer/Tubing/Mouthpiece) Kit Use as needed every 6 hours   • ISOtretinoin 30 MG capsule Take 1 pill BID on a healthy fatty meal   • Vitamin D, Ergocalciferol, 1.25 mg (50,000 units) capsule Take 1 capsule by mouth 2 days a week.   • sulfacetamide-sulfur 10-5 % topical emulsion cleanser Use to wash face, back and chest BID, leave on for mins and rinse off   • clindamycin (Cleocin-T) 1 % lotion Apply topically 2 times daily. Use twice daily on the face chest and back   • albuterol (VENTOLIN) (2.5 MG/3ML) 0.083% nebulizer solution Take 3 mLs by nebulization every 6 hours as needed for Wheezing.   • cetirizine (ZYRTEC) 5 MG/5ML solution Take 10 mLs by mouth daily.   • albuterol 108 (90 Base) MCG/ACT inhaler Inhale 2 puffs into the lungs every 4 hours as needed for Shortness of Breath or Wheezing.   • erythromycin (THERAMYCIN) 2 % topical solution Apply thin film to affected areas after washing twice a day     No current facility-administered medications for this visit.       ALLERGIES:  No Known Allergies    PHYSICAL EXAMINATION:  There were no vitals filed for this visit.  There is no height or weight on file to calculate BMI.  General appearance:  Well-appearing female    Orientation:  Oriented to time, place and person.  Mood & Affect:  Normal.    Thorough examination of the face, scalp, conjunctivae, ears, neck, chest and back reveal the following findings:  fewer cystic inflammatory red papules on the forehead, rest of face cleared with post inflammatory hyperpigmentation    No cervical LAD (lymphadenopathy).            Assessment & Plan    Inflammatory and Cystic Acne  Considering degree of inflammation and to prevent further scarring will plan to start  patient on Accutane.  Patient registered into iPledge    PT has completed 3 months of accutane , starting month 4  Urine pregnancy test negative today 5/24/2022  Patient signed the iPledge consent form today.  Continue   - ISOtretinoin 30 MG capsule; Take 1 pill BID on a healthy fatty meal  Dispense: 60 capsule;  -Recommend to apply Vaseline on the face daily.    Patient was provided with the following information if starting on Accutane:  Patient provided with the booklet information for Accutane.  I have counseled this patient on the following to be considered if starting on Accutane  Will get a urine pregnancy test today and after the 30 day window prior to starting Accutane and again every month to confirm patient in iPledge:   Drug should not be shared with anyone.   Blood should not be donated while taking isotretinoin.   Patient program adherence.   For females, contraception adherence.    I discussed at length the issues of Accutane therapy as well as the alternatives to care.  I discussed the risks of liver abnormalities as well as possible increase in cholesterol and triglyceride.  I explained the absolute need for females of child bearing potential to practice contraception before, during and one month after Accutane therapy.  I discussed the more common possible side effects of cheilitis, dry skin, joint pain as well as the possible side effects of decreased night vision, depression and flare of inflammatory bowel disease.  I explained they will need to continue monthly labs.    Follow up in office in one month with labs at least 2 days before next appointment.  Education:  As above; provided relevant patient instructions with AVS (after visit summary).  ** Education regarding flu vaccination, blood pressure, weight and BMI were provided to the patient with AVS, including today's vitals, if recorded at today's visit.  Patient advised to follow up with PCP (primary care provider) regarding any abnormal  values.  Patient can obtain their prescription from:  May 24, 2022 - May 30, 2022 (7 - Day Prescription window)    RTC (Return to clinic) 1 months OR sooner for new, changing or concerning lesion(s).    On 5/24/2022, Mel GORE Lawrence, MA scribed the services personally performed by MD BAO Pitt, Luz Dee MD, PhD attest that I performed all of the work during this encounter and that the scribe only recorded my findings.

## 2022-06-06 ENCOUNTER — MYC REFILL (OUTPATIENT)
Dept: FAMILY MEDICINE | Facility: CLINIC | Age: 52
End: 2022-06-06
Payer: COMMERCIAL

## 2022-06-06 DIAGNOSIS — F98.8 ATTENTION DEFICIT DISORDER (ADD) WITHOUT HYPERACTIVITY: ICD-10-CM

## 2022-06-07 RX ORDER — DEXTROAMPHETAMINE SACCHARATE, AMPHETAMINE ASPARTATE MONOHYDRATE, DEXTROAMPHETAMINE SULFATE AND AMPHETAMINE SULFATE 6.25; 6.25; 6.25; 6.25 MG/1; MG/1; MG/1; MG/1
25 CAPSULE, EXTENDED RELEASE ORAL DAILY
Qty: 30 CAPSULE | Refills: 0 | Status: SHIPPED | OUTPATIENT
Start: 2022-06-07 | End: 2022-07-07

## 2022-06-07 NOTE — TELEPHONE ENCOUNTER
Routing refill request to provider for review/approval because:  Drug not on the [facility] refill protocol     Last Written Prescription Date:  5/9/22  Last Fill Quantity: 30,  # refills: 0   Last office visit provider:  2/9/22     Requested Prescriptions   Pending Prescriptions Disp Refills     amphetamine-dextroamphetamine (ADDERALL XR) 25 MG 24 hr capsule 30 capsule 0     Sig: Take 1 capsule (25 mg) by mouth daily       There is no refill protocol information for this order          Silvia Hernandez RN 06/06/22 11:27 PM

## 2022-07-07 ENCOUNTER — MYC REFILL (OUTPATIENT)
Dept: FAMILY MEDICINE | Facility: CLINIC | Age: 52
End: 2022-07-07

## 2022-07-07 DIAGNOSIS — F98.8 ATTENTION DEFICIT DISORDER (ADD) WITHOUT HYPERACTIVITY: ICD-10-CM

## 2022-07-11 RX ORDER — DEXTROAMPHETAMINE SACCHARATE, AMPHETAMINE ASPARTATE MONOHYDRATE, DEXTROAMPHETAMINE SULFATE AND AMPHETAMINE SULFATE 6.25; 6.25; 6.25; 6.25 MG/1; MG/1; MG/1; MG/1
25 CAPSULE, EXTENDED RELEASE ORAL DAILY
Qty: 30 CAPSULE | Refills: 0 | Status: SHIPPED | OUTPATIENT
Start: 2022-07-11 | End: 2022-11-09

## 2022-07-29 ENCOUNTER — ANCILLARY PROCEDURE (OUTPATIENT)
Dept: MAMMOGRAPHY | Facility: CLINIC | Age: 52
End: 2022-07-29
Attending: FAMILY MEDICINE
Payer: COMMERCIAL

## 2022-07-29 DIAGNOSIS — Z12.31 VISIT FOR SCREENING MAMMOGRAM: ICD-10-CM

## 2022-07-29 PROCEDURE — 77067 SCR MAMMO BI INCL CAD: CPT

## 2022-08-16 ENCOUNTER — OFFICE VISIT (OUTPATIENT)
Dept: FAMILY MEDICINE | Facility: CLINIC | Age: 52
End: 2022-08-16
Payer: COMMERCIAL

## 2022-08-16 VITALS
BODY MASS INDEX: 32.79 KG/M2 | WEIGHT: 191 LBS | DIASTOLIC BLOOD PRESSURE: 68 MMHG | OXYGEN SATURATION: 99 % | SYSTOLIC BLOOD PRESSURE: 118 MMHG | HEART RATE: 87 BPM

## 2022-08-16 DIAGNOSIS — F90.0 ADHD, PREDOMINANTLY INATTENTIVE TYPE: ICD-10-CM

## 2022-08-16 DIAGNOSIS — Z00.00 ENCOUNTER FOR PREVENTIVE CARE: ICD-10-CM

## 2022-08-16 PROCEDURE — 99213 OFFICE O/P EST LOW 20 MIN: CPT | Performed by: FAMILY MEDICINE

## 2022-08-16 RX ORDER — DEXTROAMPHETAMINE SACCHARATE, AMPHETAMINE ASPARTATE MONOHYDRATE, DEXTROAMPHETAMINE SULFATE AND AMPHETAMINE SULFATE 6.25; 6.25; 6.25; 6.25 MG/1; MG/1; MG/1; MG/1
25 CAPSULE, EXTENDED RELEASE ORAL DAILY
Qty: 30 CAPSULE | Refills: 0 | Status: SHIPPED | OUTPATIENT
Start: 2022-08-16 | End: 2022-09-15

## 2022-08-16 RX ORDER — DEXTROAMPHETAMINE SACCHARATE, AMPHETAMINE ASPARTATE MONOHYDRATE, DEXTROAMPHETAMINE SULFATE AND AMPHETAMINE SULFATE 6.25; 6.25; 6.25; 6.25 MG/1; MG/1; MG/1; MG/1
25 CAPSULE, EXTENDED RELEASE ORAL DAILY
Qty: 30 CAPSULE | Refills: 0 | Status: SHIPPED | OUTPATIENT
Start: 2022-10-17 | End: 2022-11-16

## 2022-08-16 RX ORDER — DEXTROAMPHETAMINE SACCHARATE, AMPHETAMINE ASPARTATE MONOHYDRATE, DEXTROAMPHETAMINE SULFATE AND AMPHETAMINE SULFATE 6.25; 6.25; 6.25; 6.25 MG/1; MG/1; MG/1; MG/1
25 CAPSULE, EXTENDED RELEASE ORAL DAILY
Qty: 30 CAPSULE | Refills: 0 | Status: SHIPPED | OUTPATIENT
Start: 2022-09-16 | End: 2022-10-16

## 2022-08-16 NOTE — PROGRESS NOTES
Problem List Items Addressed This Visit        Behavioral    ADHD, predominantly inattentive type     Continue current dosing of 25 mg daily.  Urine tox done 2/2022.   She has had some insomnia, so we discussed option of lowering dose, but she didn't want to do that.  Alternatively she could try concerta, vyvanse, strattera or ritalin. She will consider and we can discuss at follow up.    CSA signed 8/16/2022. Recheck in 3- 6 months. Next face to face appointment on or before 2/16/2022             Relevant Medications    amphetamine-dextroamphetamine (ADDERALL XR) 25 MG 24 hr capsule    amphetamine-dextroamphetamine (ADDERALL XR) 25 MG 24 hr capsule (Start on 9/16/2022)    amphetamine-dextroamphetamine (ADDERALL XR) 25 MG 24 hr capsule (Start on 10/17/2022)    Other Relevant Orders    PRIMARY CARE FOLLOW-UP SCHEDULING       Other    Encounter for preventive care     Chart reviewed - 7/29/22 [facility]                Follow-up Visit   Expected date:  Nov 16, 2022 (Approximate)      Follow Up Appointment Details:     Follow-up with whom?: Me    Follow-Up for what?: Chronic Disease f/u    Chronic Disease f/u: General (Other)    Additional Details: adhd    How?: In Person or Virtual    Is this an as-needed follow-up?: No                     Subjective   Allyssa is a 52 year old who presents for the following health issues   Chief Complaint   Patient presents with     Med check     Maybe wallace sleeping issues        History of Present Illness       Reason for visit:  ADHD medication renewal    She eats 2-3 servings of fruits and vegetables daily.She consumes 1 sweetened beverage(s) daily.She exercises with enough effort to increase her heart rate 10 to 19 minutes per day.  She exercises with enough effort to increase her heart rate 3 or less days per week. She is missing 3 dose(s) of medications per week.  She is not taking prescribed medications regularly due to side effects.             Objective    /68 (BP  Location: Left arm, Patient Position: Left side, Cuff Size: Adult Large)   Pulse 87   Wt 86.6 kg (191 lb)   SpO2 99%   BMI 32.79 kg/m    Body mass index is 32.79 kg/m .  Physical Exam  Constitutional:       Appearance: Normal appearance.   HENT:      Head: Normocephalic and atraumatic.   Cardiovascular:      Rate and Rhythm: Normal rate and regular rhythm.   Pulmonary:      Effort: Pulmonary effort is normal.   Musculoskeletal:         General: Normal range of motion.      Cervical back: Normal range of motion and neck supple.   Neurological:      General: No focal deficit present.      Mental Status: She is alert and oriented to person, place, and time.                This note has been dictated using voice recognition software. Any grammatical or context distortions are unintentional and inherent to the software

## 2022-08-16 NOTE — ASSESSMENT & PLAN NOTE
Continue current dosing of 25 mg daily.  Urine tox done 2/2022.   She has had some insomnia, so we discussed option of lowering dose, but she didn't want to do that.  Alternatively she could try concerta, vyvanse, strattera or ritalin. She will consider and we can discuss at follow up.    CSA signed 8/16/2022. Recheck in 3- 6 months. Next face to face appointment on or before 2/16/2022

## 2022-08-16 NOTE — LETTER
[facility]  08/16/22  Patient: Allyssa Pedersen  YOB: 1970  Medical Record Number: [id number]                                                                                  Non-Opioid Controlled Substance Agreement    This is an agreement between you and your provider regarding safe and appropriate use of controlled substances prescribed by your care team. Controlled substances are?medicines that can cause physical and mental dependence (abuse).     There are strict laws about having and using these medicines. We here at [facility] are  committed to working with you in your efforts to get better. To support you in this work, we'll help you schedule regular office appointments for medicine refills. If we must cancel or change your appointment for any reason, we'll make sure you have enough medicine to last until your next appointment.     As a Provider, I will:     Listen carefully to your concerns while treating you with respect.     Recommend a treatment plan that I believe is in your best interest and may involve therapies other than medicine.      Talk with you often about the possible benefits and the risk of harm of any medicine that we prescribe for you.    Assess the safety of this medicine and check how well it works.      Provide a plan on how to taper (discontinue or go off) using this medicine if the decision is made to stop its use.      ::  As a Patient, I understand controlled substances:       Are prescribed by my care provider to help me function or work and manage my condition(s).?    Are strong medicines and can cause serious side effects.       Need to be taken exactly as prescribed.?Combining controlled substances with certain medicines or chemicals (such as illegal drugs, alcohol, sedatives, sleeping pills, and benzodiazepines) can be dangerous or even fatal.? If I stop taking my medicines suddenly, I may have severe withdrawal symptoms.     The risks,  benefits, and side effects of these medicine(s) were explained to me. I agree that:    1. I will take part in other treatments as advised by my care team. This may be psychiatry or counseling, physical therapy, behavioral therapy, group treatment or a referral to specialist.    2. I will keep all my appointments and understand this is part of the monitoring of controlled substances.?My care team may require an office visit for EVERY controlled substance refill. If I miss appointments or don t follow instructions, my care team may stop my medicine    3. I will take my medicines as prescribed. I will not change the dose or schedule unless my care team tells me to. There will be no refills if I run out early.      4. I may be asked to come to the clinic and complete a urine drug test or complete a pill count. If I don t give a urine sample or participate in a pill count, the care team may stop my medicine.    5. I will only receive controlled substance prescriptions from this clinic. If I am treated by another provider, I will tell them that I am taking controlled substances and that I have a treatment agreement with this provider. I will inform my [facility] care team within one business day if I am given a prescription for any controlled substance by another healthcare provider. My [facility] care team can contact other providers and pharmacists about my use of any medicines.    6. It is up to me to make sure that I don't run out of my medicines on weekends or holidays.?If my care team is willing to refill my prescription without a visit, I must request refills only during office hours. Refills may take up to 3 business days to process. I will use one pharmacy to fill all my controlled substance prescriptions. I will notify the clinic about any changes to my insurance or medicine availability.    7. I am responsible for my prescriptions. If the medicine/prescription is lost, stolen or destroyed, it will  not be replaced.?I also agree not to share controlled substance medicines with anyone.     8. I am aware I should not use any illegal or recreational drugs. I agree not to drink alcohol unless my care team says I can.     9. If I enroll in the [place] Medical Cannabis program, I will tell my care team before my next refill.    10. I will tell my care team right away if I become pregnant, have a new medical problem treated outside of my regular clinic, or have a change in my medicines.     11. I understand that this medicine can affect my thinking, judgment and reaction time.? Alcohol and drugs affect the brain and body, which can affect the safety of my driving. Being under the influence of alcohol or drugs can affect my decision-making, behaviors, personal safety and the safety of others. Driving while impaired (DWI) can occur if a person is driving, operating or in physical control of a car, motorcycle, boat, snowmobile, ATV, motorbike, off-road vehicle or any other motor vehicle ([place] Statute 169[place].20). I understand the risk if I choose to drive or operate any vehicle or machinery.    I understand that if I do not follow any of the conditions above, my prescriptions or treatment may be stopped or changed.   I agree that my provider, clinic care team and pharmacy may work with any city, state or federal law enforcement agency that investigates the misuse, sale or other diversion of my controlled medicine. I will allow my provider to discuss my care with, or share a copy of, this agreement with any other treating provider, pharmacy or emergency room where I receive care.     I have read this agreement and have asked questions about anything I did not understand.    ________________________________________________________  Patient Signature - Allyssa Pedersen     ___________________                   Date     ________________________________________________________  Provider Signature - Melody De Guzman MD        ___________________                   Date     ________________________________________________________  Witness Signature (required if provider not present while patient signing)          ___________________                   Date

## 2022-09-23 ENCOUNTER — LAB (OUTPATIENT)
Dept: FAMILY MEDICINE | Facility: CLINIC | Age: 52
End: 2022-09-23
Attending: FAMILY MEDICINE
Payer: COMMERCIAL

## 2022-09-23 DIAGNOSIS — Z20.822 SUSPECTED 2019 NOVEL CORONAVIRUS INFECTION: ICD-10-CM

## 2022-09-23 LAB — SARS-COV-2 RNA RESP QL NAA+PROBE: NEGATIVE

## 2022-09-23 PROCEDURE — U0003 INFECTIOUS AGENT DETECTION BY NUCLEIC ACID (DNA OR RNA); SEVERE ACUTE RESPIRATORY SYNDROME CORONAVIRUS 2 (SARS-COV-2) (CORONAVIRUS DISEASE [COVID-19]), AMPLIFIED PROBE TECHNIQUE, MAKING USE OF HIGH THROUGHPUT TECHNOLOGIES AS DESCRIBED BY CMS-2020-01-R: HCPCS

## 2022-09-23 PROCEDURE — U0005 INFEC AGEN DETEC AMPLI PROBE: HCPCS

## 2022-09-25 ENCOUNTER — HEALTH MAINTENANCE LETTER (OUTPATIENT)
Age: 52
End: 2022-09-25

## 2022-11-09 ENCOUNTER — OFFICE VISIT (OUTPATIENT)
Dept: FAMILY MEDICINE | Facility: CLINIC | Age: 52
End: 2022-11-09
Payer: COMMERCIAL

## 2022-11-09 VITALS
DIASTOLIC BLOOD PRESSURE: 66 MMHG | WEIGHT: 200.7 LBS | OXYGEN SATURATION: 100 % | HEART RATE: 82 BPM | SYSTOLIC BLOOD PRESSURE: 124 MMHG | BODY MASS INDEX: 34.27 KG/M2 | RESPIRATION RATE: 16 BRPM | HEIGHT: 64 IN

## 2022-11-09 DIAGNOSIS — F90.0 ADHD, PREDOMINANTLY INATTENTIVE TYPE: ICD-10-CM

## 2022-11-09 DIAGNOSIS — E66.09 CLASS 1 OBESITY DUE TO EXCESS CALORIES WITHOUT SERIOUS COMORBIDITY WITH BODY MASS INDEX (BMI) OF 34.0 TO 34.9 IN ADULT: ICD-10-CM

## 2022-11-09 DIAGNOSIS — E66.811 CLASS 1 OBESITY DUE TO EXCESS CALORIES WITHOUT SERIOUS COMORBIDITY WITH BODY MASS INDEX (BMI) OF 34.0 TO 34.9 IN ADULT: ICD-10-CM

## 2022-11-09 DIAGNOSIS — R10.2 PELVIC PAIN IN FEMALE: ICD-10-CM

## 2022-11-09 DIAGNOSIS — N92.6 IRREGULAR MENSES: ICD-10-CM

## 2022-11-09 DIAGNOSIS — Z00.00 ENCOUNTER FOR PREVENTIVE CARE: ICD-10-CM

## 2022-11-09 DIAGNOSIS — R40.0 HAS DAYTIME DROWSINESS: ICD-10-CM

## 2022-11-09 DIAGNOSIS — Z13.820 SCREENING FOR OSTEOPOROSIS: Primary | ICD-10-CM

## 2022-11-09 DIAGNOSIS — R10.11 RUQ ABDOMINAL PAIN: ICD-10-CM

## 2022-11-09 DIAGNOSIS — Z97.5 IUD (INTRAUTERINE DEVICE) IN PLACE: ICD-10-CM

## 2022-11-09 DIAGNOSIS — Z13.220 SCREENING, LIPID: ICD-10-CM

## 2022-11-09 DIAGNOSIS — Z13.1 SCREENING FOR DIABETES MELLITUS: ICD-10-CM

## 2022-11-09 DIAGNOSIS — J01.00 SUBACUTE MAXILLARY SINUSITIS: ICD-10-CM

## 2022-11-09 DIAGNOSIS — N89.8 VAGINAL DISCHARGE: ICD-10-CM

## 2022-11-09 DIAGNOSIS — R00.2 PALPITATIONS: ICD-10-CM

## 2022-11-09 LAB
ALBUMIN SERPL BCG-MCNC: 4.3 G/DL (ref 3.5–5.2)
ALP SERPL-CCNC: 83 U/L (ref 35–104)
ALT SERPL W P-5'-P-CCNC: 19 U/L (ref 10–35)
ANION GAP SERPL CALCULATED.3IONS-SCNC: 13 MMOL/L (ref 7–15)
AST SERPL W P-5'-P-CCNC: 21 U/L (ref 10–35)
BILIRUB SERPL-MCNC: 0.4 MG/DL
BUN SERPL-MCNC: 9.4 MG/DL (ref 6–20)
CALCIUM SERPL-MCNC: 9.2 MG/DL (ref 8.6–10)
CHLORIDE SERPL-SCNC: 99 MMOL/L (ref 98–107)
CHOLEST SERPL-MCNC: 174 MG/DL
CREAT SERPL-MCNC: 0.76 MG/DL (ref 0.51–0.95)
DEPRECATED HCO3 PLAS-SCNC: 24 MMOL/L (ref 22–29)
ERYTHROCYTE [DISTWIDTH] IN BLOOD BY AUTOMATED COUNT: 13.2 % (ref 10–15)
FSH SERPL IRP2-ACNC: 6.3 MIU/ML
GFR SERPL CREATININE-BSD FRML MDRD: >90 ML/MIN/1.73M2
GLUCOSE SERPL-MCNC: 96 MG/DL (ref 70–99)
HBA1C MFR BLD: 5.3 % (ref 0–5.6)
HBV SURFACE AG SERPL QL IA: NONREACTIVE
HCT VFR BLD AUTO: 44.8 % (ref 35–47)
HCV AB SERPL QL IA: NONREACTIVE
HDLC SERPL-MCNC: 50 MG/DL
HGB BLD-MCNC: 14.9 G/DL (ref 11.7–15.7)
LDLC SERPL CALC-MCNC: 101 MG/DL
LH SERPL-ACNC: 5.7 MIU/ML
MCH RBC QN AUTO: 29 PG (ref 26.5–33)
MCHC RBC AUTO-ENTMCNC: 33.3 G/DL (ref 31.5–36.5)
MCV RBC AUTO: 87 FL (ref 78–100)
NONHDLC SERPL-MCNC: 124 MG/DL
PLATELET # BLD AUTO: 249 10E3/UL (ref 150–450)
POTASSIUM SERPL-SCNC: 3.7 MMOL/L (ref 3.4–5.3)
PROT SERPL-MCNC: 7.4 G/DL (ref 6.4–8.3)
RBC # BLD AUTO: 5.14 10E6/UL (ref 3.8–5.2)
SODIUM SERPL-SCNC: 136 MMOL/L (ref 136–145)
TRIGL SERPL-MCNC: 114 MG/DL
TSH SERPL DL<=0.005 MIU/L-ACNC: 2.11 UIU/ML (ref 0.3–4.2)
WBC # BLD AUTO: 5.5 10E3/UL (ref 4–11)

## 2022-11-09 PROCEDURE — 86803 HEPATITIS C AB TEST: CPT | Performed by: FAMILY MEDICINE

## 2022-11-09 PROCEDURE — 83001 ASSAY OF GONADOTROPIN (FSH): CPT | Performed by: FAMILY MEDICINE

## 2022-11-09 PROCEDURE — 83036 HEMOGLOBIN GLYCOSYLATED A1C: CPT | Performed by: FAMILY MEDICINE

## 2022-11-09 PROCEDURE — 80053 COMPREHEN METABOLIC PANEL: CPT | Performed by: FAMILY MEDICINE

## 2022-11-09 PROCEDURE — 87491 CHLMYD TRACH DNA AMP PROBE: CPT | Performed by: FAMILY MEDICINE

## 2022-11-09 PROCEDURE — 85027 COMPLETE CBC AUTOMATED: CPT | Performed by: FAMILY MEDICINE

## 2022-11-09 PROCEDURE — 99396 PREV VISIT EST AGE 40-64: CPT | Performed by: FAMILY MEDICINE

## 2022-11-09 PROCEDURE — 80061 LIPID PANEL: CPT | Performed by: FAMILY MEDICINE

## 2022-11-09 PROCEDURE — 36415 COLL VENOUS BLD VENIPUNCTURE: CPT | Performed by: FAMILY MEDICINE

## 2022-11-09 PROCEDURE — 87591 N.GONORRHOEAE DNA AMP PROB: CPT | Performed by: FAMILY MEDICINE

## 2022-11-09 PROCEDURE — 84443 ASSAY THYROID STIM HORMONE: CPT | Performed by: FAMILY MEDICINE

## 2022-11-09 PROCEDURE — 99215 OFFICE O/P EST HI 40 MIN: CPT | Mod: 25 | Performed by: FAMILY MEDICINE

## 2022-11-09 PROCEDURE — 87340 HEPATITIS B SURFACE AG IA: CPT | Performed by: FAMILY MEDICINE

## 2022-11-09 PROCEDURE — 83002 ASSAY OF GONADOTROPIN (LH): CPT | Performed by: FAMILY MEDICINE

## 2022-11-09 RX ORDER — LISDEXAMFETAMINE DIMESYLATE 30 MG/1
30 CAPSULE ORAL DAILY
Qty: 30 CAPSULE | Refills: 0 | Status: SHIPPED | OUTPATIENT
Start: 2023-01-10 | End: 2023-01-10

## 2022-11-09 RX ORDER — DEXTROAMPHETAMINE SACCHARATE, AMPHETAMINE ASPARTATE MONOHYDRATE, DEXTROAMPHETAMINE SULFATE AND AMPHETAMINE SULFATE 6.25; 6.25; 6.25; 6.25 MG/1; MG/1; MG/1; MG/1
25 CAPSULE, EXTENDED RELEASE ORAL DAILY
Qty: 30 CAPSULE | Refills: 0 | Status: CANCELLED | OUTPATIENT
Start: 2022-11-09

## 2022-11-09 RX ORDER — LISDEXAMFETAMINE DIMESYLATE 30 MG/1
30 CAPSULE ORAL DAILY
Qty: 30 CAPSULE | Refills: 0 | Status: SHIPPED | OUTPATIENT
Start: 2022-11-09 | End: 2022-12-09

## 2022-11-09 RX ORDER — LISDEXAMFETAMINE DIMESYLATE 30 MG/1
30 CAPSULE ORAL DAILY
Qty: 30 CAPSULE | Refills: 0 | Status: SHIPPED | OUTPATIENT
Start: 2022-12-10 | End: 2023-01-09

## 2022-11-09 ASSESSMENT — ENCOUNTER SYMPTOMS
EYE PAIN: 0
ARTHRALGIAS: 1
WEAKNESS: 0
JOINT SWELLING: 0
FEVER: 0
HEARTBURN: 0
COUGH: 1
NERVOUS/ANXIOUS: 0
SORE THROAT: 0
ABDOMINAL PAIN: 1
SHORTNESS OF BREATH: 0
CONSTIPATION: 0
HEADACHES: 0
NAUSEA: 0
CHILLS: 0
FREQUENCY: 0
BREAST MASS: 0
DIARRHEA: 0
HEMATURIA: 0
DIZZINESS: 0
HEMATOCHEZIA: 0
PARESTHESIAS: 0
DYSURIA: 0
PALPITATIONS: 0
MYALGIAS: 0

## 2022-11-09 NOTE — ASSESSMENT & PLAN NOTE
Contraception - iud - mirena since 10/2021  Pap: Normal 9/14/2021  Mammo: Normal 7/29/2022  Colonoscopy: Cologuard due again 8/2023  Std testing desired:  offered  Osteoporosis prevention discussed.  vitamin d levels ordered. Recommend daily calcium and vitamin d intake to keep good bone health. Recommend weight bearing exercise, no tobacco, and limit alcohol  dexa -ordered  Recommend sunscreen, exercise, & healthy diet.  Offered cbc, cmp, lipids and asked what other testing she  desires today  I have had an Advance Directives discussion with the patient.   Body mass index is 34.45 kg/m .   mychart active.

## 2022-11-09 NOTE — ASSESSMENT & PLAN NOTE
Complains of right upper quadrant abdominal pain x 2 years.   Us ruq  Hep b and c tests and cmp  Follow up for results after us done.

## 2022-11-09 NOTE — ASSESSMENT & PLAN NOTE
Mirena in place since October 2021  See pelvic pain, recommend us to check iud position and gyn consult due to her bleeding and pelvic pain.

## 2022-11-09 NOTE — ASSESSMENT & PLAN NOTE
She thinks she has had this her whole life. So this is not a new problem. She notices this at night once a week or so.     An event monitor is ordered.  Follow-up once results are available.  Sleep apnea could also be causing irregular heartbeat and so sleep study is also recommended.    Will also check cbc to look for anemia and cmp to look at electrolytes and tsh to look for anything that could cause palpitations.

## 2022-11-09 NOTE — ASSESSMENT & PLAN NOTE
Pelvic pain with vaginal bleeding and vaginal discharge.  Recommend pelvic ultrasound with transvaginal views as well as gynecology referral.    She had two days of uncontrollable bleeding this month, so she questions if iud is out of placed. And she has been wearing tampon daily for a month.     fsh and lh today as well as hgb

## 2022-11-09 NOTE — PROGRESS NOTES
ADHD prescription change, daytime drowsiness and irregular heartbeat, pelvic pain, irregular menses, palpitations, ruq us addressed above and beyond usual scope of annual exam.       40 mins total clinic time was spent with this patient outside and  beyond the prevent with review of the medical record and with documentation.  This time was spent on the day of service.       SUBJECTIVE:   CC: Allyssa is an 52 year old who presents for preventive health visit.     Patient has been advised of split billing requirements and indicates understanding: Yes  Healthy Habits:     Getting at least 3 servings of Calcium per day:  Yes    Bi-annual eye exam:  NO    Dental care twice a year:  Yes    Sleep apnea or symptoms of sleep apnea:  Daytime drowsiness    Diet:  Regular (no restrictions)    Frequency of exercise:  2-3 days/week    Duration of exercise:  15-30 minutes    Taking medications regularly:  Yes    Medication side effects:  Other    PHQ-2 Total Score: 2    Additional concerns today:  Yes    Today's PHQ-2 Score:   PHQ-2 ( 1999 [employer]) 11/9/2022   Q1: Little interest or pleasure in doing things 1   Q2: Feeling down, depressed or hopeless 1   PHQ-2 Score 2   PHQ-2 Total Score (12-17 Years)- Positive if 3 or more points; Administer PHQ-A if positive -   Q1: Little interest or pleasure in doing things Several days   Q2: Feeling down, depressed or hopeless Several days   PHQ-2 Score 2     Abuse: Current or Past (Physical, Sexual or Emotional) - No  Do you feel safe in your environment? Yes  Social History     Tobacco Use     Smoking status: Never     Smokeless tobacco: Never   Substance Use Topics     Alcohol use: Yes     Alcohol/week: 1.0 standard drink     Types: 1 Standard drinks or equivalent per week     Comment: Alcoholic Drinks/day: 1/week       Alcohol Use 11/9/2022   Prescreen: >3 drinks/day or >7 drinks/week? No     Reviewed orders with patient.  Reviewed health maintenance and updated orders accordingly -  Yes    Breast Cancer Screening:    Breast CA Risk Assessment (FHS-7) 11/9/2022   Do you have a family history of breast, colon, or ovarian cancer? No / Unknown     Mammogram Screening: Recommended annual mammography  Pertinent mammograms are reviewed under the imaging tab.    History of abnormal Pap smear: NO - age 30-65 PAP every 5 years with negative HPV co-testing recommended  PAP / HPV Latest Ref Rng & Units 9/14/2021 11/18/2016   PAP   Negative for Intraepithelial Lesion or Malignancy (NILM) Negative for squamous intraepithelial lesion or malignancy  Electronically signed by Renay Bill CT (ASCP) on 11/28/2016 at  2:30 PM     HPV16 Negative Negative -   HPV18 Negative Negative -   HRHPV Negative Negative -     Reviewed and updated as needed this visit by clinical staff   Tobacco  Allergies  Meds  Problems  Med Hx  Surg Hx  Fam Hx  Soc   Hx        Reviewed and updated as needed this visit by Provider   Tobacco  Allergies  Meds  Problems  Med Hx  Surg Hx  Fam Hx           Review of Systems   Constitutional: Negative for chills and fever.   HENT: Positive for congestion. Negative for ear pain, hearing loss and sore throat.    Eyes: Negative for pain and visual disturbance.   Respiratory: Positive for cough. Negative for shortness of breath.    Cardiovascular: Negative for chest pain, palpitations and peripheral edema.   Gastrointestinal: Positive for abdominal pain. Negative for constipation, diarrhea, heartburn, hematochezia and nausea.   Breasts:  Positive for tenderness. Negative for breast mass and discharge.   Genitourinary: Positive for pelvic pain, vaginal bleeding and vaginal discharge. Negative for dysuria, frequency, genital sores, hematuria and urgency.   Musculoskeletal: Positive for arthralgias. Negative for joint swelling and myalgias.   Skin: Negative for rash.   Neurological: Negative for dizziness, weakness, headaches and paresthesias.   Psychiatric/Behavioral: Positive for mood  "changes. The patient is not nervous/anxious.      Cough and sinus congestion present but she feels it is improving on its own.   Breasts tender since covid vaccine.  Mood ok but was worried about her pelvic pain and menstrual bleeding since she had to wear a tampon for a month.        OBJECTIVE:   /66 (BP Location: Left arm, Patient Position: Sitting, Cuff Size: Adult Regular)   Pulse 82   Resp 16   Ht 1.626 m (5' 4\")   Wt 91 kg (200 lb 11.2 oz)   SpO2 100%   BMI 34.45 kg/m    Physical Exam  Constitutional:       Appearance: Normal appearance.   HENT:      Head: Normocephalic and atraumatic.      Right Ear: Tympanic membrane, ear canal and external ear normal.      Left Ear: Tympanic membrane, ear canal and external ear normal.      Nose: Nose normal.      Mouth/Throat:      Mouth: Mucous membranes are moist.      Pharynx: Oropharynx is clear.   Eyes:      Extraocular Movements: Extraocular movements intact.      Conjunctiva/sclera: Conjunctivae normal.      Pupils: Pupils are equal, round, and reactive to light.   Cardiovascular:      Rate and Rhythm: Normal rate and regular rhythm.      Heart sounds: Normal heart sounds.   Pulmonary:      Effort: Pulmonary effort is normal.      Breath sounds: Normal breath sounds.   Chest:   Breasts:     Breasts are symmetrical.      Right: Normal. No swelling, bleeding, inverted nipple, mass, nipple discharge, skin change or tenderness.      Left: Normal. No swelling, bleeding, inverted nipple, mass, nipple discharge, skin change or tenderness.   Abdominal:      General: Bowel sounds are normal. There is no distension.      Palpations: Abdomen is soft. There is no mass.      Tenderness: There is abdominal tenderness (ruq tender and suprapubic tenderness). There is no right CVA tenderness, left CVA tenderness, guarding or rebound.      Hernia: No hernia is present.   Genitourinary:     General: Normal vulva.      Exam position: Lithotomy position.      Vagina: Normal. "      Cervix: Normal.      Uterus: Normal.       Adnexa: Right adnexa normal and left adnexa normal.      Rectum: Normal.   Musculoskeletal:         General: Normal range of motion.      Cervical back: Normal range of motion and neck supple.   Skin:     General: Skin is warm and dry.      Capillary Refill: Capillary refill takes less than 2 seconds.   Neurological:      General: No focal deficit present.      Mental Status: She is alert and oriented to person, place, and time.   Psychiatric:         Mood and Affect: Mood normal.         Behavior: Behavior normal.         Thought Content: Thought content normal.         Judgment: Judgment normal.         ASSESSMENT/PLAN:     Problem List Items Addressed This Visit        Nervous and Auditory    Pelvic pain in female     Pelvic pain with vaginal bleeding and vaginal discharge.  Recommend pelvic ultrasound with transvaginal views as well as gynecology referral.    She had two days of uncontrollable bleeding this month, so she questions if iud is out of placed. And she has been wearing tampon daily for a month.     fsh and lh today as well as hgb          Relevant Orders    Ob/Gyn Referral    RUQ abdominal pain     Complains of right upper quadrant abdominal pain x 2 years.   Us ruq  Hep b and c tests and cmp  Follow up for results after us done.          Relevant Orders    US Abdomen Limited    Hepatitis C antibody    Hepatitis B surface antigen       Respiratory    RESOLVED: Subacute maxillary sinusitis       Digestive    Class 1 obesity due to excess calories without serious comorbidity with body mass index (BMI) of 34.0 to 34.9 in adult     Worsening, healthy lifestyle discussed.   Will check tsh and a1c.          Relevant Medications    lisdexamfetamine (VYVANSE) 30 MG capsule    lisdexamfetamine (VYVANSE) 30 MG capsule (Start on 12/10/2022)    lisdexamfetamine (VYVANSE) 30 MG capsule (Start on 1/10/2023)       Circulatory    Palpitations     She thinks she has had  this her whole life. So this is not a new problem. She notices this at night once a week or so.     An event monitor is ordered.  Follow-up once results are available.  Sleep apnea could also be causing irregular heartbeat and so sleep study is also recommended.    Will also check cbc to look for anemia and cmp to look at electrolytes and tsh to look for anything that could cause palpitations.         Relevant Orders    CBC with platelets    Comprehensive metabolic panel (BMP + Alb, Alk Phos, ALT, AST, Total. Bili, TP)    Adult Cardiac Event Monitor       Urinary    IUD (intrauterine device) in place     Mirena in place since October 2021  See pelvic pain, recommend us to check iud position and gyn consult due to her bleeding and pelvic pain.         Irregular menses     Gyn referral - has had menstrual bleeding x 1 month. And see pelvic pain.   tsh today         Relevant Orders    TSH with free T4 reflex    US Pelvic Complete with Transvaginal    Follicle stimulating hormone    Luteinizing Hormone, Adult    Vaginal discharge     Gc/ chlamydia ordered         Relevant Orders    Chlamydia trachomatis PCR    Neisseria gonorrhoeae PCR       Behavioral    ADHD, predominantly inattentive type     Vyvanse trial.   adderall caused insomnia so didn't like.   Alternatively she could try concerta, vyvanse, strattera or ritalin. She will consider and we can discuss at follow up.     CSA signed 8/16/2022. Recheck in 3- 6 months. Next face to face appointment on or before 5/9/2023         Relevant Medications    lisdexamfetamine (VYVANSE) 30 MG capsule    lisdexamfetamine (VYVANSE) 30 MG capsule (Start on 12/10/2022)    lisdexamfetamine (VYVANSE) 30 MG capsule (Start on 1/10/2023)       Other    Encounter for preventive care     Contraception - iud - mirena since 10/2021  Pap: Normal 9/14/2021  Mammo: Normal 7/29/2022  Colonoscopy: Cologuard due again 8/2023  Std testing desired:  offered  Osteoporosis prevention discussed.   "vitamin d levels ordered. Recommend daily calcium and vitamin d intake to keep good bone health. Recommend weight bearing exercise, no tobacco, and limit alcohol  dexa -ordered  Recommend sunscreen, exercise, & healthy diet.  Offered cbc, cmp, lipids and asked what other testing she  desires today  I have had an Advance Directives discussion with the patient.   Body mass index is 34.45 kg/m .   aliyah active.         Has daytime drowsiness     Concern for sleep apnea given her elevated BMI.  A sleep consult is placed.         Relevant Orders    Adult Sleep Eval & Management Referral   Other Visit Diagnoses     Screening for osteoporosis    -  Primary    Relevant Orders    DX Hip/Pelvis/Spine    Screening, lipid        Relevant Orders    Lipid Profile    Screening for diabetes mellitus        Relevant Orders    Hemoglobin A1c          Patient has been advised of split billing requirements and indicates understanding: Yes      COUNSELING:  Reviewed preventive health counseling, as reflected in patient instructions       Regular exercise       Healthy diet/nutrition       Contraception       Osteoporosis prevention/bone health       Safe sex practices/STD prevention       Advance Care Planning    Estimated body mass index is 34.45 kg/m  as calculated from the following:    Height as of this encounter: 1.626 m (5' 4\").    Weight as of this encounter: 91 kg (200 lb 11.2 oz).    Weight management plan: Discussed healthy diet and exercise guidelines    She reports that she has never smoked. She has never used smokeless tobacco.    Melody De Guzman MD  [facility]  "

## 2022-11-09 NOTE — ASSESSMENT & PLAN NOTE
Vyvanse trial.   adderall caused insomnia so didn't like.   Alternatively she could try concerta, vyvanse, strattera or ritalin. She will consider and we can discuss at follow up.     CSA signed 8/16/2022. Recheck in 3- 6 months. Next face to face appointment on or before 5/9/2023

## 2022-11-10 LAB
C TRACH DNA SPEC QL NAA+PROBE: NEGATIVE
N GONORRHOEA DNA SPEC QL NAA+PROBE: NEGATIVE

## 2022-11-14 ENCOUNTER — HOSPITAL ENCOUNTER (OUTPATIENT)
Dept: ULTRASOUND IMAGING | Facility: HOSPITAL | Age: 52
Discharge: HOME | End: 2022-11-14
Attending: FAMILY MEDICINE
Payer: COMMERCIAL

## 2022-11-14 DIAGNOSIS — R10.11 RUQ ABDOMINAL PAIN: ICD-10-CM

## 2022-11-14 DIAGNOSIS — N92.6 IRREGULAR MENSES: ICD-10-CM

## 2022-11-14 PROCEDURE — 76856 US EXAM PELVIC COMPLETE: CPT

## 2022-11-14 PROCEDURE — 76705 ECHO EXAM OF ABDOMEN: CPT

## 2022-11-17 ENCOUNTER — HOSPITAL ENCOUNTER (OUTPATIENT)
Dept: CARDIOLOGY | Facility: HOSPITAL | Age: 52
Discharge: HOME | End: 2022-11-17
Attending: FAMILY MEDICINE | Admitting: FAMILY MEDICINE
Payer: COMMERCIAL

## 2022-11-17 DIAGNOSIS — R00.2 PALPITATIONS: ICD-10-CM

## 2022-11-17 PROCEDURE — 93270 REMOTE 30 DAY ECG REV/REPORT: CPT

## 2022-12-05 PROCEDURE — 93272 ECG/REVIEW INTERPRET ONLY: CPT | Performed by: INTERNAL MEDICINE

## 2023-01-10 ENCOUNTER — OFFICE VISIT (OUTPATIENT)
Dept: FAMILY MEDICINE | Facility: CLINIC | Age: 53
End: 2023-01-10
Payer: COMMERCIAL

## 2023-01-10 VITALS
WEIGHT: 196 LBS | HEART RATE: 88 BPM | SYSTOLIC BLOOD PRESSURE: 124 MMHG | DIASTOLIC BLOOD PRESSURE: 70 MMHG | BODY MASS INDEX: 33.64 KG/M2 | OXYGEN SATURATION: 98 %

## 2023-01-10 DIAGNOSIS — R40.0 HAS DAYTIME DROWSINESS: Primary | ICD-10-CM

## 2023-01-10 DIAGNOSIS — F90.0 ADHD, PREDOMINANTLY INATTENTIVE TYPE: ICD-10-CM

## 2023-01-10 DIAGNOSIS — R10.2 PELVIC PAIN IN FEMALE: ICD-10-CM

## 2023-01-10 DIAGNOSIS — N92.6 IRREGULAR MENSES: ICD-10-CM

## 2023-01-10 DIAGNOSIS — R10.11 RUQ ABDOMINAL PAIN: ICD-10-CM

## 2023-01-10 DIAGNOSIS — Z00.00 ENCOUNTER FOR PREVENTIVE CARE: ICD-10-CM

## 2023-01-10 DIAGNOSIS — E66.811 CLASS 1 OBESITY DUE TO EXCESS CALORIES WITHOUT SERIOUS COMORBIDITY WITH BODY MASS INDEX (BMI) OF 34.0 TO 34.9 IN ADULT: ICD-10-CM

## 2023-01-10 DIAGNOSIS — R00.2 PALPITATIONS: ICD-10-CM

## 2023-01-10 DIAGNOSIS — N89.8 VAGINAL DISCHARGE: ICD-10-CM

## 2023-01-10 DIAGNOSIS — E66.09 CLASS 1 OBESITY DUE TO EXCESS CALORIES WITHOUT SERIOUS COMORBIDITY WITH BODY MASS INDEX (BMI) OF 34.0 TO 34.9 IN ADULT: ICD-10-CM

## 2023-01-10 DIAGNOSIS — K76.0 HEPATIC STEATOSIS: ICD-10-CM

## 2023-01-10 PROCEDURE — 99214 OFFICE O/P EST MOD 30 MIN: CPT | Performed by: FAMILY MEDICINE

## 2023-01-10 RX ORDER — LISDEXAMFETAMINE DIMESYLATE 30 MG/1
30 CAPSULE ORAL DAILY
Qty: 30 CAPSULE | Refills: 0 | Status: SHIPPED | OUTPATIENT
Start: 2023-03-13 | End: 2023-04-17

## 2023-01-10 RX ORDER — LISDEXAMFETAMINE DIMESYLATE 30 MG/1
30 CAPSULE ORAL DAILY
Qty: 30 CAPSULE | Refills: 0 | Status: SHIPPED | OUTPATIENT
Start: 2023-02-10 | End: 2023-03-12

## 2023-01-10 RX ORDER — LISDEXAMFETAMINE DIMESYLATE 30 MG/1
30 CAPSULE ORAL DAILY
Qty: 30 CAPSULE | Refills: 0 | Status: SHIPPED | OUTPATIENT
Start: 2023-01-10 | End: 2023-02-09

## 2023-01-10 NOTE — PROGRESS NOTES
Problem List Items Addressed This Visit        Nervous and Auditory    Pelvic pain in female     11/2022 pelvic ultrasound showed fibroids and physiologic cyst but otherwise normal. She says she can tolerate pain and declined gyn consult for now although it was offered          RESOLVED: RUQ abdominal pain     See hepatic steatosis in problem list.             Digestive    Class 1 obesity due to excess calories without serious comorbidity with body mass index (BMI) of 34.0 to 34.9 in adult     OBESITY with bmi 33.64 today and chronic comorbid conditions including hepatic steatosis. Physician assisted weight reduction recommended.          Relevant Medications    lisdexamfetamine (VYVANSE) 30 MG capsule    lisdexamfetamine (VYVANSE) 30 MG capsule (Start on 2/10/2023)    lisdexamfetamine (VYVANSE) 30 MG capsule (Start on 3/13/2023)    Hepatic steatosis     11/2022 ruq us done for ruq abd pain showed hepatic steatosis, weight reduction is strongly recommended.             Circulatory    Palpitations     Heart monitor done and no worrisome rhythm seen, she says she has had this her whole life, so at this point we will watch and wait.             Urinary    Irregular menses     LH and FSH are consistent with ovulation.  Normal TSH is noted.         RESOLVED: Vaginal discharge     Declined gyn referral today.            Behavioral    ADHD, predominantly inattentive type     Feels it helps her make more conscious decisions.   Will continue vyvanse 30 mg po q day.   CSA signed 8/16/2022. Next face to face visit is due 7/10/2023 or sooner.         Relevant Medications    lisdexamfetamine (VYVANSE) 30 MG capsule    lisdexamfetamine (VYVANSE) 30 MG capsule (Start on 2/10/2023)    lisdexamfetamine (VYVANSE) 30 MG capsule (Start on 3/13/2023)       Other    Encounter for preventive care     Chart was reviewed labs from 11/14/2022 show normal CBC, normal CMP, normal FSH, LH, TSH, and essentially normal lipids.         Has  daytime drowsiness - Primary     Weight reduction again discussed, sleep medicine consult also again recommended. She is not ready for this. She wants to eat right and exercise first. But if not getting results should see sleep specialist.          Relevant Orders    Adult Sleep Eval & Management Referral      Follow-up Visit   Expected date:  Apr 10, 2023 (Approximate)      Follow Up Appointment Details:     Follow-up with whom?: PCP    Follow-Up for what?: Chronic Disease f/u    Chronic Disease f/u: General (Other) Comment - adhd    How?: In Person    Is this an as-needed follow-up?: No                     Subjective   Allyssa is a 52 year old who presents for the following health issues   Chief Complaint   Patient presents with     follow up blood work      History of Present Illness       Vascular Disease:  She presents for follow up of vascular disease.  She never takes nitroglycerin. She is not taking daily aspirin.    Reason for visit:  Test review    She eats 2-3 servings of fruits and vegetables daily.She consumes 1 sweetened beverage(s) daily.She exercises with enough effort to increase her heart rate 20 to 29 minutes per day.  She exercises with enough effort to increase her heart rate 3 or less days per week. She is missing 3 dose(s) of medications per week.  She is not taking prescribed medications regularly due to other.          Objective    /70 (BP Location: Left arm, Patient Position: Left side, Cuff Size: Adult Large)   Pulse 88   Wt 88.9 kg (196 lb)   SpO2 98%   BMI 33.64 kg/m    Body mass index is 33.64 kg/m .  Physical Exam  Constitutional:       Appearance: Normal appearance.   HENT:      Head: Normocephalic and atraumatic.   Cardiovascular:      Rate and Rhythm: Normal rate and regular rhythm.   Pulmonary:      Effort: Pulmonary effort is normal.   Musculoskeletal:         General: Normal range of motion.      Cervical back: Normal range of motion and neck supple.   Neurological:       General: No focal deficit present.      Mental Status: She is alert and oriented to person, place, and time.                 This note has been dictated using voice recognition software. Any grammatical or context distortions are unintentional and inherent to the software

## 2023-01-10 NOTE — ASSESSMENT & PLAN NOTE
Weight reduction again discussed, sleep medicine consult also again recommended. She is not ready for this. She wants to eat right and exercise first. But if not getting results should see sleep specialist.

## 2023-01-10 NOTE — ASSESSMENT & PLAN NOTE
11/2022 pelvic ultrasound showed fibroids and physiologic cyst but otherwise normal. She says she can tolerate pain and declined gyn consult for now although it was offered

## 2023-01-10 NOTE — ASSESSMENT & PLAN NOTE
11/2022 ruq us done for ruq abd pain showed hepatic steatosis, weight reduction is strongly recommended.

## 2023-01-10 NOTE — ASSESSMENT & PLAN NOTE
Chart was reviewed labs from 11/14/2022 show normal CBC, normal CMP, normal FSH, LH, TSH, and essentially normal lipids.

## 2023-01-10 NOTE — ASSESSMENT & PLAN NOTE
Feels it helps her make more conscious decisions.   Will continue vyvanse 30 mg po q day.   CSA signed 8/16/2022. Next face to face visit is due 7/10/2023 or sooner.

## 2023-01-10 NOTE — ASSESSMENT & PLAN NOTE
Heart monitor done and no worrisome rhythm seen, she says she has had this her whole life, so at this point we will watch and wait.

## 2023-01-10 NOTE — ASSESSMENT & PLAN NOTE
OBESITY with bmi 33.64 today and chronic comorbid conditions including hepatic steatosis. Physician assisted weight reduction recommended.

## 2023-01-25 ENCOUNTER — ANCILLARY PROCEDURE (OUTPATIENT)
Dept: BONE DENSITY | Facility: CLINIC | Age: 53
End: 2023-01-25
Attending: FAMILY MEDICINE
Payer: COMMERCIAL

## 2023-01-25 DIAGNOSIS — Z13.820 SCREENING FOR OSTEOPOROSIS: ICD-10-CM

## 2023-01-25 PROCEDURE — 77080 DXA BONE DENSITY AXIAL: CPT | Mod: TC | Performed by: RADIOLOGY

## 2023-02-08 ENCOUNTER — OFFICE VISIT (OUTPATIENT)
Dept: FAMILY MEDICINE | Facility: CLINIC | Age: 53
End: 2023-02-08
Payer: COMMERCIAL

## 2023-02-08 VITALS
RESPIRATION RATE: 14 BRPM | HEIGHT: 64 IN | SYSTOLIC BLOOD PRESSURE: 115 MMHG | DIASTOLIC BLOOD PRESSURE: 78 MMHG | HEART RATE: 83 BPM | OXYGEN SATURATION: 98 % | BODY MASS INDEX: 32.42 KG/M2 | WEIGHT: 189.9 LBS

## 2023-02-08 DIAGNOSIS — R10.2 PELVIC PAIN IN FEMALE: ICD-10-CM

## 2023-02-08 DIAGNOSIS — N92.6 IRREGULAR MENSES: ICD-10-CM

## 2023-02-08 DIAGNOSIS — E66.09 CLASS 1 OBESITY DUE TO EXCESS CALORIES WITHOUT SERIOUS COMORBIDITY WITH BODY MASS INDEX (BMI) OF 32.0 TO 32.9 IN ADULT: ICD-10-CM

## 2023-02-08 DIAGNOSIS — E66.811 CLASS 1 OBESITY DUE TO EXCESS CALORIES WITHOUT SERIOUS COMORBIDITY WITH BODY MASS INDEX (BMI) OF 32.0 TO 32.9 IN ADULT: ICD-10-CM

## 2023-02-08 DIAGNOSIS — F90.0 ADHD, PREDOMINANTLY INATTENTIVE TYPE: ICD-10-CM

## 2023-02-08 DIAGNOSIS — R00.2 PALPITATIONS: ICD-10-CM

## 2023-02-08 DIAGNOSIS — E78.2 MIXED HYPERLIPIDEMIA: ICD-10-CM

## 2023-02-08 DIAGNOSIS — Z00.00 ENCOUNTER FOR PREVENTIVE CARE: ICD-10-CM

## 2023-02-08 DIAGNOSIS — R40.0 HAS DAYTIME DROWSINESS: Primary | ICD-10-CM

## 2023-02-08 DIAGNOSIS — K76.0 HEPATIC STEATOSIS: ICD-10-CM

## 2023-02-08 PROCEDURE — 99215 OFFICE O/P EST HI 40 MIN: CPT | Performed by: FAMILY MEDICINE

## 2023-02-08 NOTE — ASSESSMENT & PLAN NOTE
OBESITY WITH BMI 32.60 AT INTAKE AND CHRONIC COMORBID WEIGHT RELATED CONDITIONS INCULDING: hepatic steatosis, hyperlipidemia,  daytime drowsiness, pelvic pain, irregular menses, palpitations, and adhd on vyvanse.    PATIENT IS BEGINNING ACTIVE MEDICALLY SUPERVISED WEIGHT LOSS STARTING AT Body mass index is 32.6 kg/m . - planning to utilize low calorie diet, physical activity and medications to facilitate this loss    Contraception IUD - she understands she cannot become pregnant on weight loss medications as they have generally, not been tested in pregnancy.     Patient declined 24 week weight loss program due to thinks she can do it on her own.   Patient declined 3 pack health coaching.   She plans To schedule with dietician for weight loss: [phone]   We did discuss food supplements.     Medications started today: none, recently started vyvanse and wants to see how much weight loss that might cause because it is allowing her to focus better on her goals.     Current goal(s):   - 20 g protein, 2 cup veggies and some healthy fat at each meal.   -discuss strategies to avoid compulsive eating at future date.   -On her intake form she did report daytime sleepiness so may be prudent to have a sleep study if she is not losing weight  -On her intake form she reported nighttime eating so trying to control her appetite in the evening will be helpful.    Lab assessment plan: labs reviewed today.     Follow up every other month with me and the dietician    DISCUSSION _________________________________________________________________    Dx: Initial bmi is Body mass index is 32.6 kg/m .  With the following weight related comorbid medical conditions: hepatic steatosis, hyperlipidemia,  daytime drowsiness, pelvic pain, irregular menses, palpitations, and adhd on vyvanse.    BECAUSE OF ABOVE PROBLEMS IT IS STRONGLY ADVISED THAT Allyssa LOSE WEIGHT.  BELOW IS MY PLAN FOR her     Start weight 189 lbs, Body mass index is 32.6 kg/m .   2/8/2023    Medication notes:   Medications she takes, that are known to increase weight: none  Medications she takes, known to decrease weight: vyvanse  Medications she takes whose dose may be affected by weight changes:     Saxenda - could consider  Wegovy - could consider.   Metformin - could consider.   Wellbutrin - could consider.   Naltrexone - could consider.   Contrave - could consider.   Phentermine/ diethylproprion -could consider.   Topamax - could consider.   Qsymia -could consider.   Orlistat - could consider.   Plenity - could consider.     Potential barriers to weight loss identified thus far:   -Mental health issues  -Eating wrong types of food  -Eating too much  -lack of exercise  -genetics-mother is overweight  -Stress  -Fatigue  -History of abuse  -Eats most of her food at the end of the day  -Eats or snacks without noticing she is eating  -Craves chips, crackers, sweets, cheese, popcorn    Strengths that may help weight loss:  -she is in charge of the food shopping  -Her family is supportive of her health goals
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
## 2023-03-17 ENCOUNTER — OFFICE VISIT (OUTPATIENT)
Dept: FAMILY MEDICINE | Facility: CLINIC | Age: 53
End: 2023-03-17
Payer: COMMERCIAL

## 2023-03-17 VITALS
HEIGHT: 64 IN | DIASTOLIC BLOOD PRESSURE: 78 MMHG | OXYGEN SATURATION: 99 % | BODY MASS INDEX: 32.17 KG/M2 | RESPIRATION RATE: 16 BRPM | SYSTOLIC BLOOD PRESSURE: 125 MMHG | WEIGHT: 188.4 LBS | HEART RATE: 85 BPM

## 2023-03-17 DIAGNOSIS — E78.2 MIXED HYPERLIPIDEMIA: ICD-10-CM

## 2023-03-17 DIAGNOSIS — R40.0 HAS DAYTIME DROWSINESS: ICD-10-CM

## 2023-03-17 DIAGNOSIS — E66.811 CLASS 1 OBESITY DUE TO EXCESS CALORIES WITHOUT SERIOUS COMORBIDITY WITH BODY MASS INDEX (BMI) OF 32.0 TO 32.9 IN ADULT: ICD-10-CM

## 2023-03-17 DIAGNOSIS — K76.0 HEPATIC STEATOSIS: ICD-10-CM

## 2023-03-17 DIAGNOSIS — F90.0 ADHD, PREDOMINANTLY INATTENTIVE TYPE: ICD-10-CM

## 2023-03-17 DIAGNOSIS — E66.09 CLASS 1 OBESITY DUE TO EXCESS CALORIES WITHOUT SERIOUS COMORBIDITY WITH BODY MASS INDEX (BMI) OF 32.0 TO 32.9 IN ADULT: ICD-10-CM

## 2023-03-17 DIAGNOSIS — R00.2 PALPITATIONS: ICD-10-CM

## 2023-03-17 PROCEDURE — 99214 OFFICE O/P EST MOD 30 MIN: CPT | Performed by: FAMILY MEDICINE
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
## 2023-03-17 NOTE — ASSESSMENT & PLAN NOTE
Still complains of daytime sleepiness. Sleep referral recommended. She says she has this booked for April
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
## 2023-03-17 NOTE — ASSESSMENT & PLAN NOTE
adhd improved with vyvanse but she is having worse sleep and maybe some anxiety. So I did recommend collaborative psychiatry consult. Order is placed.

## 2023-03-17 NOTE — ASSESSMENT & PLAN NOTE
Palpitations, wants weight loss medication, so we discussed avoiding qsymia and phentermine are to be avoided. She says this is not bothering her lately.

## 2023-03-24 ENCOUNTER — VIRTUAL VISIT (OUTPATIENT)
Dept: SURGERY | Facility: CLINIC | Age: 53
End: 2023-03-24
Payer: COMMERCIAL

## 2023-03-24 DIAGNOSIS — E66.9 OBESITY (BMI 30-39.9): Primary | ICD-10-CM

## 2023-03-24 PROCEDURE — 97802 MEDICAL NUTRITION INDIV IN: CPT | Mod: VID

## 2023-03-24 NOTE — LETTER
"    3/24/2023         RE: Allyssa Pedersen  [address]  [facility] [address]        Dear Colleague,    Thank you for referring your patient, Allyssa Pedersen, to the [facility] [place]. Please see a copy of my visit note below.    Allyssa Pedersen is a 52 year old who is being evaluated via a billable video visit.      How would you like to obtain your AVS? MyChart  If the video visit is dropped, the invitation should be resent by: Text to cell phone: [phone]  Will anyone else be joining your video visit? No          Medical Weight Loss Initial Diet Evaluation  Assessment:  Allyssa is presenting today for a new weight management nutrition consultation. Pt has had an initial appointment with Dr. De Guzman.  Weight loss medication: none.     Personal Goals: Wants to come up with a lifestyle plan that is sustainable without a \"Diet Program\".    Anthropometrics:    Pt's weight is 188 lbs  Initial weight: 188 lbs     BMI: 32.34 kg/m2  Ideal body weight: 54.7 kg (120 lb 9.5 oz)  Adjusted ideal body weight: 67 kg (147 lb 11.4 oz)  Estimated RMR ([place]-St Jeor equation):  1,450 kcals x 1.2 (sedentary) = 1,740 kcals (for weight maintenance)      Recommended Protein Intake: 60-80 grams of protein/day    Medical History:  Patient Active Problem List   Diagnosis     Class 1 obesity due to excess calories without serious comorbidity with body mass index (BMI) of 32.0 to 32.9 in adult     ADHD, predominantly inattentive type     IUD (intrauterine device) in place     Encounter for preventive care     Has daytime drowsiness     Palpitations     Pelvic pain in female     Irregular menses     Hepatic steatosis     Mixed hyperlipidemia      Diabetes: no  HbA1c:  No results found for: HGBA1C    Nutrition History:   Food allergies/intolerances/cultural or religous food customs: No   Weight loss history: Has done Weight Watchers and worked well for her. D/t life stress she ends up falling off track. Tends " to snack more in the evening time     Vitamins/Mineral Supplementation: AG1 green powder    Dietary Recall:  Breakfast: 1 egg with vegetables   Lunch: skips or light leftovers   Dinner: protein with carb and vegetable     Typical Snacks: cookies, chips,   Overnight eating: No     Eating out: 2-3x per week     Beverages: coffee with creamer (protein shake), Water, sparkling water, occasional soda, 1-2x per week alcohol beverage     Exercise: walk for 30 min     Nutrition Diagnosis (PES statement):   Overweight/Obesity (NC 3.3) related to nutrition knowledge defecit as evidenced by patients subjective comments and BMI 32.34 kg/m2    Nutrition Intervention  1. Food and/or Nutrient Delivery   a. Placed emphasis on importance of developing a healthy meal routine, aiming for 3 meals a day and no snacks.  b. Placed emphasis on eating protein first.  2. Nutrition Education   a. Discussed with patient how to build a meal: the importance of including a lean/low fat protein at each meal, include a source of vegetables at a minimum of lunch and dinner and limiting carbohydrate intake  b. Educated on sources of lean protein, portion sizes, the amount of grams found in each source. Recommend patient to aim for 20-30g protein at each meal.  c. Educated on how to read a food label: keeping total fat <10g and sugar <10g per serving.  d. Discussed the importance of adequate hydration, with emphasis on drinking 64oz of water or zero calorie beverages per day.  3. Nutrition Counseling   a. Encouraged importance of developing routine exercise for health benefits and weight loss.      Goals established by patient:   1. Increase water intake, aim for 2 bottles of water per day   2. Walk for 30 min for at least 2x per week.  3. Aim for 20 g protein per meal     Handouts provided:  My plate   Protein source list     Assessment/Plan:    Pt will follow up in 1 month(s) with bariatrician and 3 month(s) with dietitian.       Video-Visit  Details    Type of service:  Video Visit    Video Start Time (time video started): 10:30 am    Video End Time (time video stopped): 11:04 am    Originating Location (pt. Location): Home        Distant Location (provider location):  Off-site    Mode of Communication:  Video Conference via [facility]    Physician has received verbal consent for a Video Visit from the patient? Yes      Pebbles Olivares RD        Again, thank you for allowing me to participate in the care of your patient.        Sincerely,        Pebbles Olivares RD

## 2023-03-24 NOTE — PROGRESS NOTES
"Allyssa Pedersen is a 52 year old who is being evaluated via a billable video visit.      How would you like to obtain your AVS? MyChart  If the video visit is dropped, the invitation should be resent by: Text to cell phone: [phone]  Will anyone else be joining your video visit? No          Medical Weight Loss Initial Diet Evaluation  Assessment:  Allyssa is presenting today for a new weight management nutrition consultation. Pt has had an initial appointment with Dr. De Guzman.  Weight loss medication: none.     Personal Goals: Wants to come up with a lifestyle plan that is sustainable without a \"Diet Program\".    Anthropometrics:    Pt's weight is 188 lbs  Initial weight: 188 lbs     BMI: 32.34 kg/m2  Ideal body weight: 54.7 kg (120 lb 9.5 oz)  Adjusted ideal body weight: 67 kg (147 lb 11.4 oz)  Estimated RMR ([place]-St Jeor equation):  1,450 kcals x 1.2 (sedentary) = 1,740 kcals (for weight maintenance)      Recommended Protein Intake: 60-80 grams of protein/day    Medical History:  Patient Active Problem List   Diagnosis     Class 1 obesity due to excess calories without serious comorbidity with body mass index (BMI) of 32.0 to 32.9 in adult     ADHD, predominantly inattentive type     IUD (intrauterine device) in place     Encounter for preventive care     Has daytime drowsiness     Palpitations     Pelvic pain in female     Irregular menses     Hepatic steatosis     Mixed hyperlipidemia      Diabetes: no  HbA1c:  No results found for: HGBA1C    Nutrition History:   Food allergies/intolerances/cultural or religous food customs: No   Weight loss history: Has done Weight Watchers and worked well for her. D/t life stress she ends up falling off track. Tends to snack more in the evening time     Vitamins/Mineral Supplementation: AG1 green powder    Dietary Recall:  Breakfast: 1 egg with vegetables   Lunch: skips or light leftovers   Dinner: protein with carb and vegetable     Typical Snacks: cookies, chips,   Overnight " eating: No     Eating out: 2-3x per week     Beverages: coffee with creamer (protein shake), Water, sparkling water, occasional soda, 1-2x per week alcohol beverage     Exercise: walk for 30 min     Nutrition Diagnosis (PES statement):   Overweight/Obesity (NC 3.3) related to nutrition knowledge defecit as evidenced by patients subjective comments and BMI 32.34 kg/m2    Nutrition Intervention  1. Food and/or Nutrient Delivery   a. Placed emphasis on importance of developing a healthy meal routine, aiming for 3 meals a day and no snacks.  b. Placed emphasis on eating protein first.  2. Nutrition Education   a. Discussed with patient how to build a meal: the importance of including a lean/low fat protein at each meal, include a source of vegetables at a minimum of lunch and dinner and limiting carbohydrate intake  b. Educated on sources of lean protein, portion sizes, the amount of grams found in each source. Recommend patient to aim for 20-30g protein at each meal.  c. Educated on how to read a food label: keeping total fat <10g and sugar <10g per serving.  d. Discussed the importance of adequate hydration, with emphasis on drinking 64oz of water or zero calorie beverages per day.  3. Nutrition Counseling   a. Encouraged importance of developing routine exercise for health benefits and weight loss.      Goals established by patient:   1. Increase water intake, aim for 2 bottles of water per day   2. Walk for 30 min for at least 2x per week.  3. Aim for 20 g protein per meal     Handouts provided:  My plate   Protein source list     Assessment/Plan:    Pt will follow up in 1 month(s) with bariatrician and 3 month(s) with dietitian.       Video-Visit Details    Type of service:  Video Visit    Video Start Time (time video started): 10:30 am    Video End Time (time video stopped): 11:04 am    Originating Location (pt. Location): Home        Distant Location (provider location):  Off-site    Mode of Communication:  Video  Conference via [facility]    Physician has received verbal consent for a Video Visit from the patient? Yes      Pebbles Olivares RD

## 2023-04-06 ASSESSMENT — SLEEP AND FATIGUE QUESTIONNAIRES
HOW LIKELY ARE YOU TO NOD OFF OR FALL ASLEEP WHILE SITTING INACTIVE IN A PUBLIC PLACE: WOULD NEVER DOZE
HOW LIKELY ARE YOU TO NOD OFF OR FALL ASLEEP WHILE WATCHING TV: MODERATE CHANCE OF DOZING
HOW LIKELY ARE YOU TO NOD OFF OR FALL ASLEEP WHILE SITTING QUIETLY AFTER LUNCH WITHOUT ALCOHOL: SLIGHT CHANCE OF DOZING
HOW LIKELY ARE YOU TO NOD OFF OR FALL ASLEEP WHILE SITTING AND READING: HIGH CHANCE OF DOZING
HOW LIKELY ARE YOU TO NOD OFF OR FALL ASLEEP IN A CAR, WHILE STOPPED FOR A FEW MINUTES IN TRAFFIC: WOULD NEVER DOZE
HOW LIKELY ARE YOU TO NOD OFF OR FALL ASLEEP WHEN YOU ARE A PASSENGER IN A CAR FOR AN HOUR WITHOUT A BREAK: SLIGHT CHANCE OF DOZING
HOW LIKELY ARE YOU TO NOD OFF OR FALL ASLEEP WHILE LYING DOWN TO REST IN THE AFTERNOON WHEN CIRCUMSTANCES PERMIT: HIGH CHANCE OF DOZING
HOW LIKELY ARE YOU TO NOD OFF OR FALL ASLEEP WHILE SITTING AND TALKING TO SOMEONE: WOULD NEVER DOZE

## 2023-04-07 ENCOUNTER — OFFICE VISIT (OUTPATIENT)
Dept: SLEEP MEDICINE | Facility: CLINIC | Age: 53
End: 2023-04-07
Payer: COMMERCIAL

## 2023-04-07 VITALS
DIASTOLIC BLOOD PRESSURE: 81 MMHG | SYSTOLIC BLOOD PRESSURE: 122 MMHG | BODY MASS INDEX: 31.86 KG/M2 | HEIGHT: 64 IN | WEIGHT: 186.6 LBS | HEART RATE: 84 BPM | OXYGEN SATURATION: 99 %

## 2023-04-07 DIAGNOSIS — R40.0 HAS DAYTIME DROWSINESS: ICD-10-CM

## 2023-04-07 DIAGNOSIS — G47.30 SLEEP-RELATED BREATHING DISORDER: Primary | ICD-10-CM

## 2023-04-07 PROCEDURE — 99205 OFFICE O/P NEW HI 60 MIN: CPT | Performed by: NURSE PRACTITIONER

## 2023-04-07 NOTE — PROGRESS NOTES
Outpatient Sleep Medicine Consultation:      Name: Allyssa Pedersen MRN# [id number]   Age: 52 year old YOB: 1970     Date of Consultation: 2023  Consultation is requested by: Melody De Guzman MD  [address] [place] [address] Melody De Guzman  Primary care provider: Melody De Guzman       Reason for Sleep Consult:     Allyssa Pedersen is sent by Melody De Guzman for a sleep consultation regarding excessive daytime sleepiness    Patient s Reason for visit  Allyssa Pedersen main reason for visit: Help with getting better sleep  Patient states problem(s) started: Always have had it.  Allyssa Pedersen's goals for this visit: To get a better understanding for sleep problems and get tools to help get better sleep           Assessment and Plan:     Summary Sleep Diagnoses:  Sleep-related breathing disorder  Excessive daytime sleepiness  Insomnia, sleep maintenance    Comorbid Diagnoses:  Obesity, class I  Palpitations  Hyperlipidemia  ADHD      Summary Recommendations:  Orders Placed This Encounter   Procedures     HST-Home Sleep Apnea Test - Noxturnal Returnable   1.  Recommend patient undergo a sleep study.  Discussed benefits and limitations of both the polysomnographic study as well as a home sleep test, and patient opted for home sleep test.  2.  Recommend patient follow-up in clinic approximately 2 weeks after completion of sleep study.  This will be to discuss her results as well as develop a plan of care going forward.  3.  Recommend patient streamline her sleep hygiene practices to mitigate any further sleep disturbances  4.  Recommend weight management to obtain BMI less than 30.0  5.  Discussed not driving an automobile should she become drowsy      Summary Counselin.  Discussed pathophysiology of both central sleep apnea as well as obstructive sleep apnea  2.  Discussed implications of untreated sleep apnea in the context of her medical diagnoses  3.  Discussed positive sleep hygiene  practices  4.  Discussed effects of weight gain, weight loss, as well as alcohol intake prior to hour of sleep with sleep apnea  5.  Recommend patient not drive a car if she is drowsy    Medical Decision-making:   Educational materials provided in her after visit instructions    Total time spent reviewing medical records, history and physical examination, review of previous testing and interpretation as well as documentation on this date: 45 minutes    Additional 15 minutes on the date of service was spent performing the following:     -Preparing to see the patient  -Obtaining and/or reviewing separately obtained history   -Ordering medications, tests, or procedures   -Documenting clinical information in the electronic or other health record          CC: Melody De Guzman          History of Present Illness:     Allyssa Pedersen is a very pleasant 52-year-old perimenopausal female who presents to the sleep medicine clinic upon the advice of her medical provider, Melody De Guzman.  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    Patient admits to being troubled by her sleep her entire life.  Patient thinks she snores, but is unaware of its level of intensity.  Although she does not have difficulty with achieving sleep, she is bothered by maintaining sleep.  She states she may awaken from her sleep 1-2 episodes per night, and may take more than 2 hours to return to sleep.  During the time that she returns to sleep, she will use her phone to read, play games, go on her computer.  She takes 2-3 naps per week of about 60 minutes in duration.  She states she does feel better after her naps.  She states that she obtains 6-7 hours of sleep each night, but would like to achieve 7.5-8 hours.    Allyssa states she does suffer from bruxism.  She does believe that her sleep issues stem from her diagnosis of ADHD as well as anxiety.    We did discuss that she has a STOP-BANG score of 4/8 indicating a higher pretest probability of sleep  apnea, however if the sleep study does not rule in CLAUDETTE, we would then address her insomnia issue, likely with a referral to the clinical sleep psychologist.      Past Sleep Evaluations: None    SLEEP-WAKE SCHEDULE:     Work/School Days: Patient goes to school/work: No   Usually gets into bed at 11:00pm  Takes patient about 10 minutes to fall asleep  Has trouble falling asleep 0 nights per week  Wakes up in the middle of the night 0-1 when I do wake up at night I'm awake for 2+ hours times.  Wakes up due to External stimuli (bed partner, pets, noise, etc);Anxiety;Other  She has trouble falling back asleep 2-3 times a week.   It usually takes 2+ hours to get back to sleep  Patient is usually up at 6:00-6:30am  Uses alarm: Yes    Weekends/Non-work Days/All Other Days:  Usually gets into bed at 11:30pm   Takes patient about 10 minutes to fall asleep  Patient is usually up at 7 am  Uses alarm: No    Sleep Need  Patient gets  6-7 hours sleep on average   Patient thinks she needs about 7.5-8 hours sleep    Allyssa Pedersen prefers to sleep in this position(s): Side   Patient states they do the following activities in bed: Read;Use phone, computer, or tablet    Naps  Patient takes a purposeful nap 2-3 times a week and naps are usually 1 hour in duration  She feels better after a nap: Yes  She dozes off unintentionally 0 days per week  Patient has had a driving accident or near-miss due to sleepiness/drowsiness: No      SLEEP DISRUPTIONS:    Breathing/Snoring  Patient snores:Yes  Other people complain about her snoring: No  Patient has been told she stops breathing in her sleep:No  She has issues with the following: Morning headaches;Stuffy nose when you wake up    Movement:  Patient gets pain, discomfort, with an urge to move:  Yes  It happens when she is resting:  No  It happens more at night:  Yes  Patient has been told she kicks her legs at night:  No     Behaviors in Sleep:  Allyssa Pedersen has experienced the following  behaviors while sleeping: Teeth grinding  She has experienced sudden muscle weakness during the day: No      Is there anything else you would like your sleep provider to know: I believe most of my sleep problems are related to my ADHD and anxiety        CAFFEINE AND OTHER SUBSTANCES:    Patient consumes caffeinated beverages per day:  2  Last caffeine use is usually: 2pm  List of any prescribed or over the counter stimulants that patient takes: Vyvanse  List of any prescribed or over the counter sleep medication patient takes: None  List of previous sleep medications that patient has tried: Melatonin  Patient drinks alcohol to help them sleep: No  Patient drinks alcohol near bedtime: No    Family History:  Patient has a family member been diagnosed with a sleep disorder: No            SCALES:    EPWORTH SLEEPINESS SCALE         4/6/2023     4:57 PM    Troy Sleepiness Scale ( VIJAYA Jimenes  0611-2071<br>ESS - [place]/English - Final version - 21 Nov 07 - [facility] [place].)   Sitting and reading High chance of dozing   Watching TV Moderate chance of dozing   Sitting, inactive in a public place (e.g. a theatre or a meeting) Would never doze   As a passenger in a car for an hour without a break Slight chance of dozing   Lying down to rest in the afternoon when circumstances permit High chance of dozing   Sitting and talking to someone Would never doze   Sitting quietly after a lunch without alcohol Slight chance of dozing   In a car, while stopped for a few minutes in traffic Would never doze   Troy Score (MC) 10   Troy Score (Sleep) 10         INSOMNIA SEVERITY INDEX (SOFIA)          4/6/2023     4:32 PM   Insomnia Severity Index (SOFIA)   Difficulty falling asleep 1   Difficulty staying asleep 2   Problems waking up too early 3   How SATISFIED/DISSATISFIED are you with your CURRENT sleep pattern? 3   How NOTICEABLE to others do you think your sleep problem is in terms of impairing the quality of your life? 3  "  How WORRIED/DISTRESSED are you about your current sleep problem? 2   To what extent do you consider your sleep problem to INTERFERE with your daily functioning (e.g. daytime fatigue, mood, ability to function at work/daily chores, concentration, memory, mood, etc.) CURRENTLY? 4   SOFIA Total Score 18       Guidelines for Scoring/Interpretation:  Total score categories:  0-7 = No clinically significant insomnia   8-14 = Subthreshold insomnia   15-21 = Clinical insomnia (moderate severity)  22-28 = Clinical insomnia (severe)  Used via courtesy of www.etouchesth.va.gov with permission from Hugo Gore PhD., [facility]      STOP BANG         4/6/2023     4:58 PM   STOP BANG Questionnaire (  2008, the American Society of Anesthesiologists, Inc. [employer]   1. Snoring - Do you snore loudly (louder than talking or loud enough to be heard through closed doors)? No   2. Tired - Do you often feel tired, fatigued, or sleepy during daytime? Yes   3. Observed - Has anyone observed you stop breathing during your sleep? No   4. Blood pressure - Do you have or are you being treated for high blood pressure? No   5. BMI - BMI more than 35 kg/m2? No   6. Age - Age over 50 yr old? Yes   7. Neck circumference - Neck circumference greater than 40 cm? No   8. Gender - Gender male? No   STOP BANG Score (MC): 3 (High risk of CLAUDETTE)         GAD7         View : No data to display.                  CAGE-AID         View : No data to display.                CAGE-AID reprinted with permission from the [place] Medical Journal, SAIRA Corral. and KEN Agustin, \"Conjoint screening questionnaires for alcohol and drug abuse\" [place] Medical Journal 94: 135-140, 1995.      PATIENT HEALTH QUESTIONNAIRE-9 (PHQ - 9)        8/21/2020     9:00 AM   PHQ-9 ([employer])   1.  Little interest or pleasure in doing things 0   2.  Feeling down, depressed, or hopeless 0   3.  Trouble falling or staying asleep, or sleeping too much 1 "   4.  Feeling tired or having little energy 1   5.  Poor appetite or overeating 1   6.  Feeling bad about yourself - or that you are a failure or have let yourself or your family down 1   7.  Trouble concentrating on things, such as reading the newspaper or watching television 1   8.  Moving or speaking so slowly that other people could have noticed. Or the opposite - being so fidgety or restless that you have been moving around a lot more than usual 0   9.  Thoughts that you would be better off dead, or of hurting yourself in some way 0   PHQ-9 Total Score 5   If you checked off any problems, how difficult have these problems made it for you to do your work, take care of things at home, or get along with other people? Somewhat difficult   6.  Feeling bad about yourself 1   7.  Trouble concentrating 1   8.  Moving slowly or restless 0   9.  Suicidal or self-harm thoughts 0   Difficulty at work, home, or with people Somewhat difficult       Developed by Love Frye, Jyoti Myers, Jose Antonio Kelly and colleagues, with an educational mick from [employer] No permission required to reproduce, translate, display or distribute.        Allergies:    No Known Allergies    Medications:    Current Outpatient Medications   Medication Sig Dispense Refill     lisdexamfetamine (VYVANSE) 30 MG capsule Take 1 capsule (30 mg) by mouth daily for 30 days 30 capsule 0       Problem List:  Patient Active Problem List    Diagnosis Date Noted     Mixed hyperlipidemia 02/08/2023     Priority: Medium     Hepatic steatosis 01/10/2023     Priority: Medium     Has daytime drowsiness 11/09/2022     Priority: Medium     Palpitations 11/09/2022     Priority: Medium     Pelvic pain in female 11/09/2022     Priority: Medium     Irregular menses 11/09/2022     Priority: Medium     Encounter for preventive care 08/16/2022     Priority: Medium     ADHD, predominantly inattentive type      Priority: Medium     IUD (intrauterine device)  in place 11/18/2016     Priority: Medium     Mirena place '15 @ [facility]         Class 1 obesity due to excess calories without serious comorbidity with body mass index (BMI) of 32.0 to 32.9 in adult      Priority: Medium        Past Medical/Surgical History:  Past Medical History:   Diagnosis Date     Acute non-recurrent maxillary sinusitis 10/09/2017     Attention deficit disorder     Created by Conversion  Replacement Utility updated for latest IMO load     Enlarged uterus 09/14/2021     Excessive, frequent and irregular menstruation 09/04/2020     Obesity (BMI 30-39.9)     Created by Conversion      Plantar fasciitis, left 09/22/2016     RUQ abdominal pain 11/09/2022     Vaginal discharge 11/09/2022     Past Surgical History:   Procedure Laterality Date     HC REMOVAL OF TONSILS,<13 Y/O      Description: Tonsillectomy;  Recorded: 12/28/2007;     WISDOM TOOTH EXTRACTION         Social History:  Social History     Socioeconomic History     Marital status:      Spouse name: Not on file     Number of children: 4     Years of education: 16     Highest education level: Not on file   Occupational History     Not on file   Tobacco Use     Smoking status: Never     Smokeless tobacco: Never   Vaping Use     Vaping status: Never Used   Substance and Sexual Activity     Alcohol use: Yes     Alcohol/week: 1.0 standard drink of alcohol     Types: 1 Standard drinks or equivalent per week     Comment: Alcoholic Drinks/day: 1/week     Drug use: Not Currently     Sexual activity: Yes     Partners: Male     Birth control/protection: Condom, I.U.D.   Other Topics Concern     Parent/sibling w/ CABG, MI or angioplasty before 65F 55M? 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 . For fun she likes to travel, likes to go to [place] [employer] games, likes [place] and [facility].      Social Determinants of Health     Financial Resource Strain: Not on file   Food Insecurity: Not on file    Transportation Needs: Not on file   Physical Activity: Not on file   Stress: Not on file   Social Connections: Not on file   Intimate Partner Violence: Not on file   Housing Stability: Not on file       Family History:  Family History   Problem Relation Age of Onset     Thyroid Disease Mother      Diabetes Mother      Other - See Comments Mother         Heart issue, unsure of diagnosis     Hypertension Mother      Obesity Mother      Seizure Disorder Mother      Multiple fractures Father         fell and paralyzed     Hyperlipidemia Father      Substance Abuse Father         Alcohol     Attention Deficit Disorder Brother      Hypertension Brother      Substance Abuse Brother         Alcohol     Autoimmune Disease Brother      Genetic Disorder Brother         hepatitis/cirrhosis of the liver     Alzheimer Disease Maternal Grandmother      Diabetes Maternal Grandmother      Cancer Paternal Grandmother         Bone     Anxiety Disorder Paternal Grandmother      No Known Problems Paternal Grandfather      Depression Daughter      Anxiety Disorder Daughter      Attention Deficit Disorder Daughter      Depression Daughter      Anxiety Disorder Daughter      Attention Deficit Disorder Daughter      Attention Deficit Disorder Son      Genetic Disorder Son         Erick's Sylvester Syndrome     Other - See Comments Son         Erick's Sylvester Syndrome - genetic disorder     Diabetes Cousin         Most of my maternal cousins have type1     Multiple endocrine neoplasia No family hx of      Thyroid Cancer No family hx of        Review of Systems:  A complete review of systems reviewed by me is negative with the exeption of what has been mentioned in the history of present illness.  In the last TWO WEEKS have you experienced any of the following symptoms?  Fevers: No  Night Sweats: Yes  Weight Gain: No  Pain at Night: No  Double Vision: No  Changes in Vision: No  Difficulty Breathing through Nose: No  Sore Throat in Morning:  "No  Dry Mouth in the Morning: No  Shortness of Breath Lying Flat: No  Shortness of Breath With Activity: No  Awakening with Shortness of Breath: No  Increased Cough: No  Heart Racing at Night: Yes  Swelling in Feet or Legs: No  Diarrhea at Night: No  Heartburn at Night: No  Urinating More than Once at Night: No  Losing Control of Urine at Night: No  Joint Pains at Night: No  Headaches in Morning: Yes  Weakness in Arms or Legs: No  Depressed Mood: Yes  Anxiety: Yes     Physical Examination:  Vitals: /81   Pulse 84   Ht 1.626 m (5' 4\")   Wt 84.6 kg (186 lb 9.6 oz)   SpO2 99%   BMI 32.03 kg/m    BMI= Body mass index is 32.03 kg/m .       Physical Exam  Vitals and nursing note reviewed.   Constitutional:       General: She is not in acute distress.     Appearance: Normal appearance. She is obese.   HENT:      Head: Normocephalic and atraumatic.      Right Ear: External ear normal.      Left Ear: External ear normal.      Nose: Nose normal.      Mouth/Throat:      Mouth: Mucous membranes are moist.      Dentition: Normal.      Pharynx: Oropharynx is clear.      Comments: Elongated uvula  Eyes:      Conjunctiva/sclera: Conjunctivae normal.   Neck:      Thyroid: Thyroid normal. No thyromegaly.      Vascular: No JVD.   Cardiovascular:      Rate and Rhythm: Normal rate and regular rhythm.      Pulses: Normal pulses.      Heart sounds: Normal heart sounds.   Pulmonary:      Effort: Pulmonary effort is normal.      Breath sounds: Normal breath sounds. No wheezing or rales.   Chest:      Chest wall: No tenderness.   Musculoskeletal:         General: Normal range of motion.      Cervical back: Normal range of motion and neck supple.   Lymphadenopathy:      Cervical: No neck adenopathy.   Skin:     General: Skin is warm and dry.      Capillary Refill: Capillary refill takes less than 2 seconds.   Neurological:      General: No focal deficit present.      Mental Status: She is alert and oriented to person, place, and " time.   Psychiatric:         Mood and Affect: Mood normal.         Behavior: Behavior normal.         Thought Content: Thought content normal.         Judgment: Judgment normal.     Physical Exam   Nursing note and vitals reviewed.  Constitutional: She appears healthy. No distress.   HENT:   Head: Normocephalic and atraumatic.   Right Ear: External ear normal.   Left Ear: External ear normal.   Nose: Nose normal.   Mouth/Throat: Mucous membranes are moist. Dentition is normal. Oropharynx is clear.   Eyes: Conjunctivae are normal.   Neck: Thyroid normal. No JVD present. No neck adenopathy. No thyromegaly present.   Cardiovascular: Normal rate, regular rhythm, normal heart sounds and normal pulses.   Pulmonary/Chest: Effort normal and breath sounds normal. She has no wheezes. She has no rales. She exhibits no tenderness.   Abdominal: Normal appearance.   Musculoskeletal:         General: Normal range of motion.      Cervical back: Normal range of motion and neck supple.   Neurological: She is alert and oriented to person, place, and time.   Skin: Skin is warm and dry. Capillary refill takes less than 2 seconds.   Psychiatric: Her behavior is normal. Mood, judgment and thought content normal.         Mallampati Class: II.  Elongated uvula  Tonsillar Stage: 2  visible at pillars.         Data: All pertinent previous laboratory data reviewed     Recent Labs   Lab Test 11/09/22  1045 10/01/18  1050     --    POTASSIUM 3.7  --    CHLORIDE 99  --    CO2 24  --    ANIONGAP 13  --    GLC 96 85   BUN 9.4  --    CR 0.76  --    SUSAN 9.2  --        Recent Labs   Lab Test 11/09/22  1045   WBC 5.5   RBC 5.14   HGB 14.9   HCT 44.8   MCV 87   MCH 29.0   MCHC 33.3   RDW 13.2          Recent Labs   Lab Test 11/09/22  1045   PROTTOTAL 7.4   ALBUMIN 4.3   BILITOTAL 0.4   ALKPHOS 83   AST 21   ALT 19       TSH (uIU/mL)   Date Value   11/09/2022 2.11   09/09/2021 1.02   10/01/2018 1.45       No results found for: UAMP, UBARB,  BENZODIAZEUR, UCANN, UCOC, OPIT, UPCP    No results found for: IRONSAT, [id number], TRISHA    pH (no units)   Date Value   09/09/2021 4.0 (L)   08/28/2020 4.0 (L)       @LABRCNTIPR(phv:4,pco2v:4,po2v:4,hco3v:4,elva:4,o2per:4)@    Echocardiology: No results found for this or any previous visit (from the past 4320 hour(s)).    Chest x-ray: No results found for this or any previous visit from the past 365 days.      Chest CT: No results found for this or any previous visit from the past 365 days.      PFT: Most Recent [facility] Pulmonary Function Testing    No results found for: 20001  No results found for: 20002  No results found for: 20003  No results found for: 20015  No results found for: 20016  No results found for: 20027  No results found for: 20028  No results found for: 20029  No results found for: 20079  No results found for: 20080  No results found for: 20081  No results found for: 20335  No results found for: 20105  No results found for: 20053  No results found for: 20054  No results found for: 20055      SARITHA Ceballos CNP 4/7/2023     Sleep Medicine      .This note was written with the assistance of the Dragon voice-dictation technology software. The final document, although reviewed, may contain errors. For corrections, please contact the office.

## 2023-04-07 NOTE — PATIENT INSTRUCTIONS
"      MY TREATMENT INFORMATION FOR SLEEP APNEA-  Allyssa MERE Pedersen    DOCTOR : SARITHA Ceballos CNP    Am I having a sleep study at a sleep center?  --->Due to normal delays, you will be contacted within 2-4 weeks to schedule    Am I having a home sleep study?  --->Watch the video for the device you are using:    -/drop off device-   https://www.Dormir.com/watch?v=yGGFBdELGhk    -Disposable device sent out require phone/computer application-   https://www.Dormir.com/watch?v=BCce_vbiwxE      Frequently asked questions:  1. What is Obstructive Sleep Apnea (CLAUDETTE)? CLAUDETTE is the most common type of sleep apnea. Apnea means, \"without breath.\"  Apnea is most often caused by narrowing or collapse of the upper airway as muscles relax during sleep.   Almost everyone has occasional apneas. Most people with sleep apnea have had brief interruptions at night frequently for many years.  The severity of sleep apnea is related to how frequent and severe the events are.   2. What are the consequences of CLAUDETTE? Symptoms include: feeling sleepy during the day, snoring loudly, gasping or stopping of breathing, trouble sleeping, and occasionally morning headaches or heartburn at night.  Sleepiness can be serious and even increase the risk of falling asleep while driving. Other health consequences may include development of high blood pressure and other cardiovascular disease in persons who are susceptible. Untreated CLAUDETTE  can contribute to heart disease, stroke and diabetes.   3. What are the treatment options? In most situations, sleep apnea is a lifelong disease that must be managed with daily therapy. Medications are not effective for sleep apnea and surgery is generally not considered until other therapies have been tried. Your treatment is your choice . Continuous Positive Airway (CPAP) works right away and is the therapy that is effective in nearly everyone. An oral device to hold your jaw forward is usually the next most " reliable option. Other options include postioning devices (to keep you off your back), weight loss, and surgery including a tongue pacing device. There is more detail about some of these options below.  4. Are my sleep studies covered by insurance? Although we will request verification of coverage, we advise you also check in advance of the study to ensure there is coverage.    Important tips for those choosing CPAP and similar devices   Know your equipment:  CPAP is continuous positive airway pressure that prevents obstructive sleep apnea by keeping the throat from collapsing while you are sleeping. In most cases, the device is  smart  and can slowly self-adjusts if your throat collapses and keeps a record every day of how well you are treated-this information is available to you and your care team.  BPAP is bilevel positive airway pressure that keeps your throat open and also assists each breath with a pressure boost to maintain adequate breathing.  Special kinds of BPAP are used in patients who have inadequate breathing from lung or heart disease. In most cases, the device is  smart  and can slowly self-adjusts to assist breathing. Like CPAP, the device keeps a record of how well you are treated.  Your mask is your connection to the device. You get to choose what feels most comfortable and the staff will help to make sure if fits. Here: are some examples of the different masks that are available:       Key points to remember on your journey with sleep apnea:  Sleep study.  PAP devices often need to be adjusted during a sleep study to show that they are effective and adjusted right.  Good tips to remember: Try wearing just the mask during a quiet time during the day so your body adapts to wearing it. A humidifier is recommended for comfort in most cases to prevent drying of your nose and throat. Allergy medication from your provider may help you if you are having nasal congestion.  Getting settled-in. It takes  more than one night for most of us to get used to wearing a mask. Try wearing just the mask during a quiet time during the day so your body adapts to wearing it. A humidifier is recommended for comfort in most cases. Our team will work with you carefully on the first day and will be in contact within 4 days and again at 2 and 4 weeks for advice and remote device adjustments. Your therapy is evaluated by the device each day.   Use it every night. The more you are able to sleep naturally for 7-8 hours, the more likely you will have good sleep and to prevent health risks or symptoms from sleep apnea. Even if you use it 4 hours it helps. Occasionally all of us are unable to use a medical therapy, in sleep apnea, it is not dangerous to miss one night.   Communicate. Call our skilled team on the number provided on the first day if your visit for problems that make it difficult to wear the device. Over 2 out of 3 patients can learn to wear the device long-term with help from our team. Remember to call our team or your sleep providers if you are unable to wear the device as we may have other solutions for those who cannot adapt to mask CPAP therapy. It is recommended that you sleep your sleep provider within the first 3 months and yearly after that if you are not having problems.   Use it for your health. We encourage use of CPAP masks during daytime quiet periods to allow your face and brain to adapt to the sensation of CPAP so that it will be a more natural sensation to awaken to at night or during naps. This can be very useful during the first few weeks or months of adapting to CPAP though it does not help medically to wear CPAP during wakefulness and  should not be used as a strategy just to meet guidelines.  Take care of your equipment. Make sure you clean your mask and tubing using directions every day and that your filter and mask are replaced as recommended or if they are not working.     BESIDES CPAP, WHAT OTHER  THERAPIES ARE THERE?    Positioning Device  Positioning devices are generally used when sleep apnea is mild and only occurs on your back.This example shows a pillow that straps around the waist. It may be appropriate for those whose sleep study shows milder sleep apnea that occurs primarily when lying flat on one's back. Preliminary studies have shown benefit but effectiveness at home may need to be verified by a home sleep test. These devices are generally not covered by medical insurance.  Examples of devices that maintain sleeping on the back to prevent snoring and mild sleep apnea.    Belt type body positioner  http://One [email or web address]    Electronic reminder  http://nightshifttherapy.com/            Oral Appliance  What is oral appliance therapy?  An oral appliance device fits on your teeth at night like a retainer used after having braces. The device is made by a specialized dentist and requires several visits over 1-2 months before a manufactured device is made to fit your teeth and is adjusted to prevent your sleep apnea. Once an oral device is working properly, snoring should be improved. A home sleep test may be recommended at that time if to determine whether the sleep apnea is adequately treated.       Some things to remember:  -Oral devices are often, but not always, covered by your medical insurance. Be sure to check with your insurance provider.   -If you are referred for oral therapy, you will be given a list of specialized dentists to consider or you may choose to visit the Web site of the American Academy of Dental Sleep Medicine  -Oral devices are less likely to work if you have severe sleep apnea or are extremely overweight.     More detailed information  An oral appliance is a small acrylic device that fits over the upper and lower teeth  (similar to a retainer or a mouth guard). This device slightly moves jaw forward, which moves the base of the tongue forward, opens the airway, improves breathing  for effective treat snoring and obstructive sleep apnea in perhaps 7 out of 10 people .  The best working devices are custom-made by a dental device  after a mold is made of the teeth 1, 2, 3.  When is an oral appliance indicated?  Oral appliance therapy is recommended as a first-line treatment for patients with primary snoring, mild sleep apnea, and for patients with moderate sleep apnea who prefer appliance therapy to use of CPAP4, 5. Severity of sleep apnea is determined by sleep testing and is based on the number of respiratory events per hour of sleep.   How successful is oral appliance therapy?  The success rate of oral appliance therapy in patients with mild sleep apnea is 75-80% while in patients with moderate sleep apnea it is 50-70%. The chance of success in patients with severe sleep apnea is 40-50%. The research also shows that oral appliances have a beneficial effect on the cardiovascular health of CLAUDETTE patients at the same magnitude as CPAP therapy7.  Oral appliances should be a second-line treatment in cases of severe sleep apnea, but if not completely successful then a combination therapy utilizing CPAP plus oral appliance therapy may be effective. Oral appliances tend to be effective in a broad range of patients although studies show that the patients who have the highest success are females, younger patients, those with milder disease, and less severe obesity. 3, 6.   Finding a dentist that practices dental sleep medicine  Specific training is available through the American Academy of Dental Sleep Medicine for dentists interested in working in the field of sleep. To find a dentist who is educated in the field of sleep and the use of oral appliances, near you, visit the Web site of the American Academy of Dental Sleep Medicine.    References  1. Rashawn et al. Objectively measured vs self-reported compliance during oral appliance therapy for sleep-disordered breathing. Chest 2013;  144(5): 3782-9884.  2. Christian et al. Objective measurement of compliance during oral appliance therapy for sleep-disordered breathing. Thorax 2013; 68(1): 91-96.  3. Roshan et al. Mandibular advancement devices in 620 men and women with CLAUDETTE and snoring: tolerability and predictors of treatment success. Chest 2004; 125: 5934-2571.  4. Markie, et al. Oral appliances for snoring and CLAUDETTE: a review. Sleep 2006; 29: 244-262.  5. Nickolas et al. Oral appliance treatment for CLAUDETTE: an update. J Clin Sleep Med 2014; 10(2): 215-227.  6. Yolanda et al. Predictors of OSAH treatment outcome. J Dent Res 2007; 86: 4149-5833.      Weight Loss:    Weight loss is a long-term strategy that may improve sleep apnea in some patients.    Weight management is a personal decision and the decision should be based on your interest and the potential benefits.  If you are interested in exploring weight loss strategies, the following discussion covers the impact on weight loss on sleep apnea and the approaches that may be successful.    Being overweight does not necessarily mean you will have health consequences.  Those who have BMI over 35 or over 27 with existing medical conditions carries greater risk.   Weight loss decreases severity of sleep apnea in most people with obesity. For those with mild obesity who have developed snoring with weight gain, even 15-30 pound weight loss can improve and occasionally eliminate sleep apnea.  Structured and life-long dietary and health habits are necessary to lose weight and keep healthier weight levels.     Though there may be significant health benefits from weight loss, long-term weight loss is very difficult to achieve- studies show success with dietary management in less than 10% of people. In addition, substantial weight loss may require years of dietary control and may be difficult if patients have severe obesity. In these cases, surgical management may be considered.  Finally, older  individuals who have tolerated obesity without health complications may be less likely to benefit from weight loss strategies.      [unfilled]    Surgery:    Surgery for obstructive sleep apnea is considered generally only when other therapies fail to work. Surgery may be discussed with you if you are having a difficult time tolerating CPAP and or when there is an abnormal structure that requires surgical correction.  Nose and throat surgeries often enlarge the airway to prevent collapse.  Most of these surgeries create pain for 1-2 weeks and up to half of the most common surgeries are not effective throughout life.  You should carefully discuss the benefits and drawbacks to surgery with your sleep provider and surgeon to determine if it is the best solution for you.   More information  Surgery for CLAUDETTE is directed at areas that are responsible for narrowing or complete obstruction of the airway during sleep.  There are a wide range of procedures available to enlarge and/or stabilize the airway to prevent blockage of breathing in the three major areas where it can occur: the palate, tongue, and nasal regions.  Successful surgical treatment depends on the accurate identification of the factors responsible for obstructive sleep apnea in each person.  A personalized approach is required because there is no single treatment that works well for everyone.  Because of anatomic variation, consultation with an examination by a sleep surgeon is a critical first step in determining what surgical options are best for each patient.  In some cases, examination during sedation may be recommended in order to guide the selection of procedures.  Patients will be counseled about risks and benefits as well as the typical recovery course after surgery. Surgery is typically not a cure for a person s CLAUDETTE.  However, surgery will often significantly improve one s CLAUDETTE severity (termed  success rate ).  Even in the absence of a cure, surgery  will decrease the cardiovascular risk associated with OSA7; improve overall quality of life8 (sleepiness, functionality, sleep quality, etc).      Palate Procedures:  Patients with CLAUDETTE often have narrowing of their airway in the region of their tonsils and uvula.  The goals of palate procedures are to widen the airway in this region as well as to help the tissues resist collapse.  Modern palate procedure techniques focus on tissue conservation and soft tissue rearrangement, rather than tissue removal.  Often the uvula is preserved in this procedure. Residual sleep apnea is common in patient after pharyngoplasty with an average reduction in sleep apnea events of 33%2.      Tongue Procedures:  ExamWhile patients are awake, the muscles that surround the throat are active and keep this region open for breathing. These muscles relax during sleep, allowing the tongue and other structures to collapse and block breathing.  There are several different tongue procedures available.  Selection of a tongue base procedure depends on characteristics seen on physical exam.  Generally, procedures are aimed at removing bulky tissues in this area or preventing the back of the tongue from falling back during sleep.  Success rates for tongue surgery range from 50-62%3.    Hypoglossal Nerve Stimulation:  Hypoglossal nerve stimulation has recently received approval from the [place] Food and Drug Administration for the treatment of obstructive sleep apnea.  This is based on research showing that the system was safe and effective in treating sleep apnea6.  Results showed that the median AHI score decreased 68%, from 29.3 to 9.0. This therapy uses an implant system that senses breathing patterns and delivers mild stimulation to airway muscles, which keeps the airway open during sleep.  The system consists of three fully implanted components: a small generator (similar in size to a pacemaker), a breathing sensor, and a stimulation lead.   Using a small handheld remote, a patient turns the therapy on before bed and off upon awakening.    Candidates for this device must be greater than 18 years of age, have moderate to severe CLAUDETTE (AHI between 15-65), BMI less than 35, have tried CPAP/oral appliance for at least 8 weeks without success, and have appropriate upper airway anatomy (determined by a sleep endoscopy performed by Dr. Floyd Love).    Hypoglossal Nerve Stimulation Pathway:    The sleep surgeon s office will work with the patient through the insurance prior-authorization process (including communications and appeals).    Nasal Procedures:  Nasal obstruction can interfere with nasal breathing during the day and night.  Studies have shown that relief of nasal obstruction can improve the ability of some patients to tolerate positive airway pressure therapy for obstructive sleep apnea1.  Treatment options include medications such as nasal saline, topical corticosteroid and antihistamine sprays, and oral medications such as antihistamines or decongestants. Non-surgical treatments can include external nasal dilators for selected patients. If these are not successful by themselves, surgery can improve the nasal airway either alone or in combination with these other options.      Combination Procedures:  Combination of surgical procedures and other treatments may be recommended, particularly if patients have more than one area of narrowing or persistent positional disease.  The success rate of combination surgery ranges from 66-80%2,3.    References  Candace CORONEL. The Role of the Nose in Snoring and Obstructive Sleep Apnoea: An Update.  Eur Arch Otorhinolaryngol. 2011; 268: 1365-73.   Bhavin SM; Claude JA; Minor JR; Pallanch JF; Tho MB; Tiffanie SG; Abraham MAXWELL. Surgical modifications of the upper airway for obstructive sleep apnea in adults: a systematic review and meta-analysis. SLEEP 2010;33(10):7659-6415. Xu SIERRA. Hypopharyngeal surgery in  obstructive sleep apnea: an evidence-based medicine review.  Arch Otolaryngol Head Neck Surg. 2006 Feb;132(2):206-13.  Yousif YH1, Anthony Y, Marshall DEIDRE. The efficacy of anatomically based multilevel surgery for obstructive sleep apnea. Otolaryngol Head Neck Surg. 2003 Oct;129(4):327-35.  Xu SIERRA, Goldberg A. Hypopharyngeal Surgery in Obstructive Sleep Apnea: An Evidence-Based Medicine Review. Arch Otolaryngol Head Neck Surg. 2006 Feb;132(2):206-13.  Lea JEFFERSON et al. Upper-Airway Stimulation for Obstructive Sleep Apnea.  N Engl J Med. 2014 Jan 9;370(2):139-49.  Mg Y et al. Increased Incidence of Cardiovascular Disease in Middle-aged Men with Obstructive Sleep Apnea. Am J Respir Crit Care Med; 2002 166: 159-165  Andujarbutch SANTIAGO et al. Studying Life Effects and Effectiveness of Palatopharyngoplasty (SLEEP) study: Subjective Outcomes of Isolated Uvulopalatopharyngoplasty. Otolaryngol Head Neck Surg. 2011; 144: 623-631.            Remember to Drive Safe... Drive Alive     Sleep health profoundly affects your health, mood, and your safety.  Thirty three percent of the population (one in three of us) is not getting enough sleep and many have a sleep disorder. Not getting enough sleep or having an untreated / undertreated sleep condition may make us sleepy without even knowing it. In fact, our driving could be dramatically impaired due to our sleep health. As your provider, here are some things I would like you to know about driving:     Here are some warning signs for impairment and dangerous drowsy driving:              -Having been awake more than 16 hours               -Looking tired               -Eyelid drooping              -Head nodding (it could be too late at this point)              -Driving for more than 30 minutes     Some things you could do to make the driving safer if you are experiencing some drowsiness:              -Stop driving and rest              -Call for transportation              -Make sure your sleep  disorder is adequately treated     Some things that have been shown NOT to work when experiencing drowsiness while driving:              -Turning on the radio              -Opening windows              -Eating any  distracting  /  entertaining  foods (e.g., sunflower seeds, candy, or any other)              -Talking on the phone      Your decision may not only impact your life, but also the life of others. Please, remember to drive safe for yourself and all of us.

## 2023-04-07 NOTE — NURSING NOTE
"Chief Complaint   Patient presents with     Sleep Problem     Fatigue        Initial /81   Pulse 84   Ht 1.626 m (5' 4\")   Wt 84.6 kg (186 lb 9.6 oz)   SpO2 99%   BMI 32.03 kg/m   Estimated body mass index is 32.03 kg/m  as calculated from the following:    Height as of this encounter: 1.626 m (5' 4\").    Weight as of this encounter: 84.6 kg (186 lb 9.6 oz).    Medication Reconciliation: complete    Neck circumference: 37 centimeters.    DME: n/a    HST /drop off and f/u appt scheduled.     Albina Ray MA  "

## 2023-04-17 ENCOUNTER — MYC REFILL (OUTPATIENT)
Dept: FAMILY MEDICINE | Facility: CLINIC | Age: 53
End: 2023-04-17
Payer: COMMERCIAL

## 2023-04-17 DIAGNOSIS — F90.0 ADHD, PREDOMINANTLY INATTENTIVE TYPE: ICD-10-CM

## 2023-04-17 NOTE — TELEPHONE ENCOUNTER
Routing refill request to provider for review/approval because:  Drug not active on patient's medication list  Drug not on the [facility] refill protocol / controlled substance    Last Written Prescription Date:  3/13/23  Last Fill Quantity: 30,  # refills: 0   Last office visit provider:  3/17/23     Requested Prescriptions   Pending Prescriptions Disp Refills     lisdexamfetamine (VYVANSE) 30 MG capsule 30 capsule 0     Sig: Take 1 capsule (30 mg) by mouth daily       There is no refill protocol information for this order          ESTEBAN DELUNA RN 04/17/23 1:42 PM

## 2023-04-18 RX ORDER — LISDEXAMFETAMINE DIMESYLATE 30 MG/1
30 CAPSULE ORAL DAILY
Qty: 30 CAPSULE | Refills: 0 | Status: SHIPPED | OUTPATIENT
Start: 2023-04-18 | End: 2023-05-17

## 2023-04-19 ENCOUNTER — VIRTUAL VISIT (OUTPATIENT)
Dept: PSYCHIATRY | Facility: CLINIC | Age: 53
End: 2023-04-19
Attending: FAMILY MEDICINE
Payer: COMMERCIAL

## 2023-04-19 ENCOUNTER — VIRTUAL VISIT (OUTPATIENT)
Dept: BEHAVIORAL HEALTH | Facility: CLINIC | Age: 53
End: 2023-04-19
Payer: COMMERCIAL

## 2023-04-19 DIAGNOSIS — F41.9 ANXIETY: ICD-10-CM

## 2023-04-19 DIAGNOSIS — G47.00 INSOMNIA, UNSPECIFIED TYPE: ICD-10-CM

## 2023-04-19 DIAGNOSIS — F98.8 ATTENTION DEFICIT DISORDER WITHOUT HYPERACTIVITY: Primary | ICD-10-CM

## 2023-04-19 DIAGNOSIS — F90.0 ADHD, PREDOMINANTLY INATTENTIVE TYPE: Primary | ICD-10-CM

## 2023-04-19 PROCEDURE — 99204 OFFICE O/P NEW MOD 45 MIN: CPT | Mod: VID | Performed by: PSYCHIATRY & NEUROLOGY

## 2023-04-19 PROCEDURE — 90791 PSYCH DIAGNOSTIC EVALUATION: CPT | Mod: VID | Performed by: PSYCHOLOGIST

## 2023-04-19 RX ORDER — LISDEXAMFETAMINE DIMESYLATE 10 MG/1
10 CAPSULE ORAL DAILY PRN
Qty: 30 CAPSULE | Refills: 0 | Status: SHIPPED | OUTPATIENT
Start: 2023-04-19 | End: 2023-08-18

## 2023-04-19 RX ORDER — CLONIDINE HYDROCHLORIDE 0.1 MG/1
.05-.1 TABLET ORAL
Qty: 30 TABLET | Refills: 1 | Status: SHIPPED | OUTPATIENT
Start: 2023-04-19 | End: 2024-08-28

## 2023-04-19 ASSESSMENT — ANXIETY QUESTIONNAIRES
2. NOT BEING ABLE TO STOP OR CONTROL WORRYING: MORE THAN HALF THE DAYS
GAD7 TOTAL SCORE: 10
7. FEELING AFRAID AS IF SOMETHING AWFUL MIGHT HAPPEN: SEVERAL DAYS
GAD7 TOTAL SCORE: 10
GAD7 TOTAL SCORE: 10
8. IF YOU CHECKED OFF ANY PROBLEMS, HOW DIFFICULT HAVE THESE MADE IT FOR YOU TO DO YOUR WORK, TAKE CARE OF THINGS AT HOME, OR GET ALONG WITH OTHER PEOPLE?: SOMEWHAT DIFFICULT
GAD7 TOTAL SCORE: 10
IF YOU CHECKED OFF ANY PROBLEMS ON THIS QUESTIONNAIRE, HOW DIFFICULT HAVE THESE PROBLEMS MADE IT FOR YOU TO DO YOUR WORK, TAKE CARE OF THINGS AT HOME, OR GET ALONG WITH OTHER PEOPLE: SOMEWHAT DIFFICULT
2. NOT BEING ABLE TO STOP OR CONTROL WORRYING: MORE THAN HALF THE DAYS
3. WORRYING TOO MUCH ABOUT DIFFERENT THINGS: MORE THAN HALF THE DAYS
3. WORRYING TOO MUCH ABOUT DIFFERENT THINGS: MORE THAN HALF THE DAYS
4. TROUBLE RELAXING: SEVERAL DAYS
7. FEELING AFRAID AS IF SOMETHING AWFUL MIGHT HAPPEN: SEVERAL DAYS
4. TROUBLE RELAXING: SEVERAL DAYS
1. FEELING NERVOUS, ANXIOUS, OR ON EDGE: MORE THAN HALF THE DAYS
5. BEING SO RESTLESS THAT IT IS HARD TO SIT STILL: SEVERAL DAYS
5. BEING SO RESTLESS THAT IT IS HARD TO SIT STILL: SEVERAL DAYS
1. FEELING NERVOUS, ANXIOUS, OR ON EDGE: MORE THAN HALF THE DAYS
7. FEELING AFRAID AS IF SOMETHING AWFUL MIGHT HAPPEN: SEVERAL DAYS
GAD7 TOTAL SCORE: 10
8. IF YOU CHECKED OFF ANY PROBLEMS, HOW DIFFICULT HAVE THESE MADE IT FOR YOU TO DO YOUR WORK, TAKE CARE OF THINGS AT HOME, OR GET ALONG WITH OTHER PEOPLE?: SOMEWHAT DIFFICULT
7. FEELING AFRAID AS IF SOMETHING AWFUL MIGHT HAPPEN: SEVERAL DAYS
GAD7 TOTAL SCORE: 10
IF YOU CHECKED OFF ANY PROBLEMS ON THIS QUESTIONNAIRE, HOW DIFFICULT HAVE THESE PROBLEMS MADE IT FOR YOU TO DO YOUR WORK, TAKE CARE OF THINGS AT HOME, OR GET ALONG WITH OTHER PEOPLE: SOMEWHAT DIFFICULT
6. BECOMING EASILY ANNOYED OR IRRITABLE: SEVERAL DAYS
6. BECOMING EASILY ANNOYED OR IRRITABLE: SEVERAL DAYS

## 2023-04-19 ASSESSMENT — PATIENT HEALTH QUESTIONNAIRE - PHQ9
10. IF YOU CHECKED OFF ANY PROBLEMS, HOW DIFFICULT HAVE THESE PROBLEMS MADE IT FOR YOU TO DO YOUR WORK, TAKE CARE OF THINGS AT HOME, OR GET ALONG WITH OTHER PEOPLE: NOT DIFFICULT AT ALL
SUM OF ALL RESPONSES TO PHQ QUESTIONS 1-9: 8
10. IF YOU CHECKED OFF ANY PROBLEMS, HOW DIFFICULT HAVE THESE PROBLEMS MADE IT FOR YOU TO DO YOUR WORK, TAKE CARE OF THINGS AT HOME, OR GET ALONG WITH OTHER PEOPLE: NOT DIFFICULT AT ALL
SUM OF ALL RESPONSES TO PHQ QUESTIONS 1-9: 8

## 2023-04-19 NOTE — PROGRESS NOTES
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 skills.  You shouldn t expect stimulant medication to help you organize your files, improve your study skills, write your paper, prioritize your tasks, reduce your clutter, or problem-solve better. But it will put your brain in a state where these skills can be more easily learned.    Often, the best pairing is stimulant medication in combination with a therapist, organizer or  that is helping you build the skills you need to succeed now that you re able to focus and concentrate.    Community Resources:    National Suicide Prevention Lifeline: 303.869.6728 (TTY: 462.100.6525). Call anytime for help.  (www.suicidepreventionlifeline.org)  National Assumption on Mental Illness (www.porfirio.org): 897.566.4115 or 939-324-7266.   Mental Health Association (www.mentalhealth.org): 697.409.6961 or 322-685-8122.  [place] Crisis Text Line: Text [place] to 888513  Suicide LifeLine Chat: suicideRate Solutions.org/chat    Administrative Billing:   Phone Call/Video Duration: 22 Minutes  Start: 3:59p  Stop: 4:21p      Patient Status:  Patient will continue to be seen for ongoing consultation and stabilization.    Signed:   Yasmin Tapia DO  [facility]    Disclaimer: This note consists of symbols derived from keyboarding, dictation and/or voice recognition  software. As a result, there may be errors in the script that have gone undetected. Please consider this when interpreting information found in this chart.

## 2023-04-19 NOTE — Clinical Note
Please call this patient to get them scheduled for a follow-up visit in 4-6 weeks. Please schedule with me and the [facility]. Thanks!

## 2023-04-19 NOTE — PROGRESS NOTES
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 reviewed at time of assessment.   Information in this assessment was obtained from the medical record and provided by patient who is a good historian. Patient will have open access to their mental health medical record.    9.   Interactive Complexity: No      Provider Name/ Credentials:  Shane Wilson PsyD, KATIE  April 19, 2023

## 2023-04-26 NOTE — PATIENT INSTRUCTIONS
Treatment Plan:  Continue Vyvanse 30 mg daily for ADHD. OK to take days off fro your medication if desired.   Start Vyvanse 10 mg daily as needed to take WITH your 30 mg dose for ADHD symptoms.   Start clonidine 0.1 mg at bedtime as needed for sleep, ADHD.   Continue therapy as planned/needed.  Continue all other cares per primary care provider.   Continue all other medications as reviewed per electronic medical record today.   Safety plan reviewed. To the Emergency Department as needed or call after hours crisis line at [phone] or [phone]. [place] Crisis Text Line: Text [place] to 355151  or  Suicide LifeLine Chat: suicidepreLeverline.org/chat  Schedule an appointment with me in 4-6 weeks or sooner as needed.  Call [facility] at [phone] to schedule.  Follow up with primary care provider as planned or sooner if needed for acute medical concerns.  Call the psychiatric nurse line with medication questions or concerns at [phone].  Coullt may be used to communicate with your provider, but this is not intended to be used for emergencies.    Patient Education:  Looks like you may not need this coupon since I think it only covers up to $60 down to a $30 copay:) But just in case: https://www.[employer].cl/coupon    Risks of stimulant medication include, but are not limited to, decreased appetite, risk of tics (and that they may be lasting), trouble sleeping, cardiac risks such as increased heart rate and blood pressure, and very rare risk of sudden cardiac death.  Also risk of addiction/tolerance/dependence.    Good ADHD resources:  Books-Mastering Adult ADHD, Driven to Distraction, Take Charge of Adult ADHD  Website-www.[employer]dater    ADHD medication expectations:   https://www.[employer]/slideshows/mra-jppd-fbmn-medication-work/    What to Expect and What NOT to Expect from Stimulant Medication  by Clarissa Jeffery, PhD    Many clients taking stimulant medication aren t sure  what to expect from it. They may have unrealistic expectations of their medication and decide it s not working. Or they may have become used to the benefits of stimulant medication and think it s no longer working.    Here s a list of things to expect when taking stimulant medications. Of course, everyone has a different reaction and a different level of sensitivity to medication, so some seem to benefit much more clearly than others.    A good response to stimulant medication typically results in:    - Improved attention span - being able to read longer while staying focused; being able to listen longer while staying focused.    - Reduced distractability - being able to remain focused when some distractions occur around you.    - Greater ease in getting back on task after an interruption.    - Better working memory - i.e., being able to remember the three things you went downstairs to get.    - Easier to start tasks and complete them.    - Reduced feeling of stress and overwhelm.    - Decreased irritability and over-reactivity    - Reduced feelings of restlessness or hyperactivity    - Reduced impulsiveness - less likely to interrupt, to make decisions with little consideration for costs or consequences    However, as Brian Oliver MD, said so memorably,  Pills don t build skills.  You shouldn t expect stimulant medication to help you organize your files, improve your study skills, write your paper, prioritize your tasks, reduce your clutter, or problem-solve better. But it will put your brain in a state where these skills can be more easily learned.    Often, the best pairing is stimulant medication in combination with a therapist, organizer or  that is helping you build the skills you need to succeed now that you re able to focus and concentrate.    Community Resources:    National Suicide Prevention Lifeline: 320.890.9552 (TTY: 848.318.9139). Call anytime for help.   "(www.suicidepreventionlifeline.org)  National [place] on Mental Illness (www.[place]firio.org): 630.581.1360 or 036-272-6040.   Mental Health Association (www.mentalhealth.org): 833.955.6106 or 245-009-8303.  [place] Crisis Text Line: Text [place] to 050665  Suicide LifeLine Chat: suicidepreventionlifeline.org/chat    Patient Education   Collaborative Care Psychiatry Service  What to Expect  Here's what to expect from your Collaborative Care Psychiatry Service (CCPS).   About CCPS  CCPS means 2 people work together to help you get better. You'll meet with a behavioral health clinician and a psychiatric doctor. A behavioral health clinician helps people with mental health problems by talking with them. A psychiatric doctor helps people by giving them medicine.  How it works  At every visit, you'll see the behavioral health clinician (BHC) first. They'll talk with you about how you're doing and teach you how to feel better.   Then you'll see the psychiatric doctor. This doctor can help you deal with troubling thoughts and feelings by giving you medicine. They'll make sure you know the plan for your care.   CCPS usually takes 3 to 6 visits. If you need more visits, we may have you start seeing a different psychiatric doctor for ongoing care.  If you have any questions or concerns, we'll be glad to talk with you.  About visits  Be open  At your visits, please talk openly about your problems. It may feel hard, but it's the best way for us to help you.  Cancelling visits  If you can't come to your visit, please call us right away at [phone]. If you don't cancel at least 24 hours (1 full day) before your visit, that's \"late cancellation.\"  Being late to visits  Being very late is the same as not showing up. You will be a \"no show\" if:  Your appointment starts with a BHC, and you're more than 15 minutes late for a 30-minute (half hour) visit. This will also cancel your appointment with the psychiatric doctor.  Your appointment " is with a psychiatric doctor only, and you're more than 15 minutes late for a 30-minute (half hour) visit.  Your appointment is with a psychiatric doctor only, and you're more than 30 minutes late for a 60-minute (full hour) visit.  If you cancel late or don't show up 2 times within 6 months, we may end your care.   Getting help between visits  If you need help between visits, you can call us Monday to Friday from 8 a.m. to 4:30 p.m. at [phone].  Emergency care  Call 911 or go to the nearest emergency department if your life or someone else's life is in danger.  Call 498 anytime to reach the national Suicide and Crisis hotline.  Medicine refills  To refill your medicine, call your pharmacy. You can also call [facility]'s Behavioral Access at [phone], Monday to Friday, 8 a.m. to 4:30 p.m. It can take 1 to 3 business days to get a refill.   Forms, letters, and tests  You may have papers to fill out, like FMLA, short-term disability, and workability. We can help you with these forms at your visits, but you must have an appointment. You may need more than 1 visit for this, to be in an intensive therapy program, or both.  Before we can give you medicine for ADHD, we may refer you to get tested for it or confirm it another way.  We may not be able to give you an emotional support animal letter.  We don't do mental health checks ordered by the court.   We don't do mental health testing, but we can refer you to get tested.   Thank you for choosing us for your care.  For informational purposes only. Not to replace the advice of your health care provider. Copyright   2022 [facility]. All rights reserved. [employer] [id number] - 12/22.

## 2023-05-09 ENCOUNTER — OFFICE VISIT (OUTPATIENT)
Dept: SLEEP MEDICINE | Facility: CLINIC | Age: 53
End: 2023-05-09
Payer: COMMERCIAL

## 2023-05-09 VITALS — HEIGHT: 64 IN | BODY MASS INDEX: 31.76 KG/M2 | WEIGHT: 186 LBS

## 2023-05-09 DIAGNOSIS — G47.30 SLEEP-RELATED BREATHING DISORDER: ICD-10-CM

## 2023-05-09 PROCEDURE — G0399 HOME SLEEP TEST/TYPE 3 PORTA: HCPCS | Performed by: INTERNAL MEDICINE

## 2023-05-09 ASSESSMENT — SLEEP AND FATIGUE QUESTIONNAIRES
HOW LIKELY ARE YOU TO NOD OFF OR FALL ASLEEP WHILE SITTING AND TALKING TO SOMEONE: WOULD NEVER DOZE
HOW LIKELY ARE YOU TO NOD OFF OR FALL ASLEEP WHILE SITTING QUIETLY AFTER LUNCH WITHOUT ALCOHOL: SLIGHT CHANCE OF DOZING
HOW LIKELY ARE YOU TO NOD OFF OR FALL ASLEEP WHILE LYING DOWN TO REST IN THE AFTERNOON WHEN CIRCUMSTANCES PERMIT: MODERATE CHANCE OF DOZING
HOW LIKELY ARE YOU TO NOD OFF OR FALL ASLEEP WHEN YOU ARE A PASSENGER IN A CAR FOR AN HOUR WITHOUT A BREAK: SLIGHT CHANCE OF DOZING
HOW LIKELY ARE YOU TO NOD OFF OR FALL ASLEEP WHILE SITTING AND READING: HIGH CHANCE OF DOZING
HOW LIKELY ARE YOU TO NOD OFF OR FALL ASLEEP WHILE WATCHING TV: SLIGHT CHANCE OF DOZING
HOW LIKELY ARE YOU TO NOD OFF OR FALL ASLEEP IN A CAR, WHILE STOPPED FOR A FEW MINUTES IN TRAFFIC: WOULD NEVER DOZE
HOW LIKELY ARE YOU TO NOD OFF OR FALL ASLEEP WHILE SITTING INACTIVE IN A PUBLIC PLACE: WOULD NEVER DOZE

## 2023-05-09 NOTE — PROGRESS NOTES
Pt is completing a home sleep test. Pt was instructed on how to put on the Noxturnal T3 device and associated equipment before going to bed and given the opportunity to practice putting it on before leaving the sleep center. Pt was reminded to bring the home sleep test kit back to the center tomorrow, at agreed upon time for download and reporting.   Neck circumference: 37 CM / 14.5 inches.  Jak Michelle SHELLY  [facility]

## 2023-05-10 ENCOUNTER — DOCUMENTATION ONLY (OUTPATIENT)
Dept: SLEEP MEDICINE | Facility: CLINIC | Age: 53
End: 2023-05-10
Payer: COMMERCIAL

## 2023-05-10 NOTE — PROCEDURES
"HOME SLEEP STUDY INTERPRETATION    Patient: Allyssa Pedersen  MRN: [id number]  YOB: 1970  Study Date: 5/9/2023  Referring Provider: Sivakumar De Guzman MD  Ordering Provider: Hernandez Hector DO     Indications for Home Study: Allyssa Pedersen is a 52 year old female who presents with symptoms suggestive of obstructive sleep apnea.    Estimated body mass index is 31.93 kg/m  as calculated from the following:    Height as of this encounter: 1.626 m (5' 4\").    Weight as of this encounter: 84.4 kg (186 lb).  Total score - [place]: 8 (5/9/2023  2:14 PM)  Total Score: 2 (5/9/2023  2:15 PM)    Data: A full night home sleep study was performed recording the standard physiologic parameters including body position, movement, sound, nasal pressure, thermal oral airflow, chest and abdominal movements with respiratory inductance plethysmography, and oxygen saturation by pulse oximetry. Pulse rate was estimated by oximetry recording. This study was considered adequate based on > 4 hours of quality oximetry and respiratory recording. As specified by the AASM Manual for the Scoring of Sleep and Associated events, version 2.3, Rule VIII.D 1B, 4% oxygen desaturation scoring for hypopneas is used as a standard of care on all home sleep apnea testing.    Analysis Time:  410.1 minutes    Respiration:   Sleep Associated Hypoxemia: sustained hypoxemia was not present. Baseline oxygen saturation was 94%.  Time with saturation less than or equal to 88% was 0 minutes. The lowest oxygen saturation was 91%.   Snoring: Snoring was present.  Respiratory events: The home study revealed a presence of 0 obstructive apneas and 0 mixed and central apneas. There were 3 hypopneas resulting in a combined apnea/hypopnea index [AHI] of 0.4 events per hour.  AHI was 0.4 per hour supine, N/A per hour prone, N/A per hour on left side, and N/A per hour on right side.   Pattern: Excluding events noted above, respiratory rate and pattern was " Normal.    Position: Percent of time spent: supine - 100%, prone - 0%, on left - 0%, on right - 0%.    Heart Rate: By pulse oximetry normal rate was noted.     Assessment:   Patient did not rule in for obstructive sleep apnea.  Sleep associated hypoxemia was not present.    Recommendations:  Consider oral appliance therapy or positional therapy.  Suggest optimizing sleep hygiene and avoiding sleep deprivation.  Weight management.    Diagnosis Code(s): Snoring R06.83    Hernandez Hector DO, May 10, 2023   Diplomate, American Board of Internal Medicine, Sleep Medicine

## 2023-05-10 NOTE — PROGRESS NOTES
This HSAT was performed using a Noxturnal T3 device which recorded snore, sound, movement activity, body position, nasal pressure, oronasal thermal airflow, pulse, oximetry and both chest and abdominal respiratory effort. HSAT data was restricted to the time patient states they were in bed.     HSAT was scored using 1B 4% hypopnea rule.     HST AHI (Non-PAT): 0.4  Snoring was reported as intermittent.  Time with SpO2 below 89% was 0 minutes.   Overall signal quality was good     Pt will follow up with sleep provider to determine appropriate therapy.       HST POST-STUDY QUESTIONNAIRE    1. What time did you go to bed?  11:00 pm  2. How long do you think it took to fall asleep?  15 minutes  3. What time did you wake up to start the day?  6:00 AM  4. Did you get up during the night at all?  No  5. If you woke up, do you remember approximately what time(s)? Don't know. I woke up but fell back to sleep.   6. Did you have any difficulty with the equipment?  No  7. Did you us any type of treatment with this study?  None  8. Was the head of the bed elevated? No  9. Did you sleep in a recliner?  No  10. Did you stop using CPAP at least 3 days before this test?  NA  11. Any other information you'd like us to know?       Jak Michelle SHELLY  [facility] [place]

## 2023-05-10 NOTE — PROGRESS NOTES
Pt returned HST device. It was downloaded and forwarded data to the clinical specialist for scoring.      CHAITANYA Mayer  [facility]

## 2023-05-15 ENCOUNTER — MYC REFILL (OUTPATIENT)
Dept: PSYCHIATRY | Facility: CLINIC | Age: 53
End: 2023-05-15
Payer: COMMERCIAL

## 2023-05-15 NOTE — TELEPHONE ENCOUNTER
"Received a Faxed refill request for cloNIDine (CATAPRES) 0.1 MG tablets from [facility]. Last script sent:    cloNIDine (CATAPRES) 0.1 MG tablet 30 tablet 1 4/19/2023  --   Sig - Route: Take 0.5-1 tablets (0.05-0.1 mg) by mouth nightly as needed (sleep) - Oral     Patient should have a refill available and enough medication through at least 6/19/23.    RN denied refill, \"Please use remaining refill. To soon to request a new script\".     Brit Friedman RN on 5/15/2023 at 12:26 PM      "

## 2023-05-17 ENCOUNTER — VIRTUAL VISIT (OUTPATIENT)
Dept: PSYCHIATRY | Facility: CLINIC | Age: 53
End: 2023-05-17
Payer: COMMERCIAL

## 2023-05-17 ENCOUNTER — VIRTUAL VISIT (OUTPATIENT)
Dept: BEHAVIORAL HEALTH | Facility: CLINIC | Age: 53
End: 2023-05-17
Payer: COMMERCIAL

## 2023-05-17 DIAGNOSIS — F98.8 ATTENTION DEFICIT DISORDER WITHOUT HYPERACTIVITY: Primary | ICD-10-CM

## 2023-05-17 DIAGNOSIS — F90.0 ADHD, PREDOMINANTLY INATTENTIVE TYPE: Primary | ICD-10-CM

## 2023-05-17 DIAGNOSIS — F41.9 ANXIETY: ICD-10-CM

## 2023-05-17 PROCEDURE — 90832 PSYTX W PT 30 MINUTES: CPT | Mod: VID | Performed by: PSYCHOLOGIST

## 2023-05-17 PROCEDURE — 99214 OFFICE O/P EST MOD 30 MIN: CPT | Mod: VID | Performed by: PSYCHIATRY & NEUROLOGY

## 2023-05-17 RX ORDER — LISDEXAMFETAMINE DIMESYLATE 30 MG/1
30 CAPSULE ORAL DAILY
Qty: 30 CAPSULE | Refills: 0 | Status: SHIPPED | OUTPATIENT
Start: 2023-07-25 | End: 2023-08-24

## 2023-05-17 RX ORDER — LISDEXAMFETAMINE DIMESYLATE 10 MG/1
10 CAPSULE ORAL DAILY PRN
Qty: 30 CAPSULE | Refills: 0 | Status: SHIPPED | OUTPATIENT
Start: 2023-06-23 | End: 2023-08-18

## 2023-05-17 RX ORDER — LISDEXAMFETAMINE DIMESYLATE 30 MG/1
30 CAPSULE ORAL DAILY
Qty: 30 CAPSULE | Refills: 0 | Status: SHIPPED | OUTPATIENT
Start: 2023-06-25 | End: 2023-07-25

## 2023-05-17 RX ORDER — LISDEXAMFETAMINE DIMESYLATE 30 MG/1
30 CAPSULE ORAL DAILY
Qty: 30 CAPSULE | Refills: 0 | Status: SHIPPED | OUTPATIENT
Start: 2023-05-26 | End: 2023-06-25

## 2023-05-17 NOTE — PROGRESS NOTES
[facility] Collaborative Care Psychiatry Services [facility]  May 17, 2023      Behavioral Health Clinician Progress Note    Patient Name: Allyssa Pedersen           Service Type:  Individual      Service Location:   MyChart / Email (patient reached)     Session Start Time: 10:30 am  Session End Time: 11:00 am      Session Length: 16 - 37      Attendees: Patient     Service Modality:  Video Visit:      Provider verified identity through the following two step process.  Patient provided:  Patient is known previously to provider    Telemedicine Visit: The patient's condition can be safely assessed and treated via synchronous audio and visual telemedicine encounter.      Reason for Telemedicine Visit: Services only offered telehealth    Originating Site (Patient Location): Patient's home    Distant Site (Provider Location): [facility] MENTAL HEALTH & ADDICTION [facility]    Consent:  The patient/guardian has verbally consented to: the potential risks and benefits of telemedicine (video visit) versus in person care; bill my insurance or make self-payment for services provided; and responsibility for payment of non-covered services.     Patient would like the video invitation sent by:  My Chart    Mode of Communication:  Video Conference via [facility]    Distant Location (Provider):  On-site    As the provider I attest to compliance with applicable laws and regulations related to telemedicine.    Visit Activities (Refresh list every visit): [facility] Only    Diagnostic Assessment Date: 04/19/2023  Treatment Plan Review Date: 08/17/2023  See Flowsheets for today's PHQ-9 and KRISTINE-7 results  Previous PHQ-9:     8/21/2020     9:00 AM 4/19/2023    10:48 AM   PHQ-9 SCORE   PHQ-9 Total Score MyChart  8 (Mild depression)   PHQ-9 Total Score 5 8    8     Previous KRISTINE-7:       4/19/2023    11:25 AM   KRISTINE-7 SCORE   Total Score 10 (moderate anxiety)   Total Score 10    10       DATA  Extended Session (60+ minutes): No  Interactive  "Complexity: No  Crisis: No  [facility] Patient: No    Treatment Objective(s) Addressed in This Session:  Target Behavior(s): ADHD/Anxiety    Anxiety: will experience a reduction in anxiety, will develop more effective coping skills to manage anxiety symptoms and will develop healthy cognitive patterns and beliefs  Attention Problems: will develop coping skills to effectively manage attention issues    Current Stressors / Issues:  She reported that she did not start the clondine because she had been sleeping just fine until the last couple of days. She also expressed her desire to take as few as medications as she can. She would like to know if she should take them daily or if it is ok to take as needed which will be addressed by Dr. Tapia. The booster dose of Vyvanse has been helpful as needed. She does not think that it has interfered with her sleep or caused any other issues with it. She estimated taking it only 3 days a month. Reported that she has looked into the resources that were sent to her last time. We discussed how she can implement them.      04/11/2023 (DA):  Patient reported reported that she has been on aderall in the past but had trouble sleeping and made her a little edgy. Vyvanse has been much better for her without intense peaks and valleys. She still has some problems with sleep; wakes up at 4am. If that happens too many days in a row, she will stop taking Vyvanse for a few days and feels it \"resets\" things. Her PCP was not a fan with that idea and wanted a review. She knows that she has some anxiety problems at times and believes she has it under control most of the time. She does have some social anxiety. Her  is more social and sometimes that causes mild problems. She does worry about her children but did not describe excessive broad, general worry. Regarding her sleep, she sometimes wakes up feeling \"wide awake\" at 4am when she usually is up at 6am. Anxiety is not waking her up but once she " wakes up her mind starts thinking and she usually does not go back to sleep. She feels more fatigue on those days. In general, she does not feel that the Vyvanse is interfering with her sleep. She is not interested in medications for anxiety but is open to learning skills for anxiety      Progress on Treatment Objective(s) / Homework:  Satisfactory progress - PREPARATION (Decided to change - considering how); Intervened by negotiating a change plan and determining options / strategies for behavior change, identifying triggers, exploring social supports, and working towards setting a date to begin behavior change    Also provided psychoeducation about behavioral health condition, symptoms, and treatment options    Care Plan review completed: Yes    Medication Review:  Changes to psychiatric medications, see updated Medication List in EPIC.     Medication Compliance:  Yes    Changes in Health Issues:   None reported    Chemical Use Review:   Substance Use: Chemical use reviewed, no active concerns identified      Tobacco Use: No current tobacco use.      Assessment: Current Emotional / Mental Status (status of significant symptoms):  Risk status (Self / Other harm or suicidal ideation)  Patient denies a history of suicidal ideation, suicide attempts, self-injurious behavior, homicidal ideation, homicidal behavior and and other safety concerns  Patient denies current fears or concerns for personal safety.  Patient denies current or recent suicidal ideation or behaviors.  Patient denies current or recent homicidal ideation or behaviors.  Patient denies current or recent self injurious behavior or ideation.  Patient denies other safety concerns.  A safety and risk management plan has not been developed at this time, however patient was encouraged to call [facility] should there be a change in any of these risk factors.    Appearance:   Appropriate   Eye Contact:   Good   Psychomotor Behavior: Normal    Attitude:   Cooperative   Orientation:   All  Speech   Rate / Production: Normal    Volume:  Normal   Mood:    Normal  Affect:    Appropriate   Thought Content:  Clear   Thought Form:  Coherent  Logical   Insight:    Good     Diagnoses:  1. Attention deficit disorder without hyperactivity    2. Anxiety        Collateral Reports Completed:  Communicated with: Dr. Tapia    Plan: (Homework, other):  Patient was given information about behavioral services and encouraged to schedule a follow up appointment with the clinic [facility].  She was also given information about mental health symptoms and treatment options .  CD Recommendations: No indications of CD issues.  Shane Wilson PsyD, LP      ______________________________________________________________________    [facility] - [place]: Treatment Plan    Patient's Name: Allyssa Pedersen  YOB: 1970    Date of Creation: May 17, 2023  Date Treatment Plan Last Reviewed/Revised: May 17, 2023    DSM5 Diagnoses: Attention-Deficit/Hyperactivity Disorder  314.01 (F90.9) Unspecified Attention -Deficit / Hyperactivity Disorder or 300.00 (F41.9) Unspecified Anxiety Disorder  Psychosocial / Contextual Factors: daughter's health  PROMIS (reviewed every 90 days):   PROMIS 10-Global Health (only subscores and total score):       4/19/2023    11:29 AM   PROMIS-10 Scores Only   Global Mental Health Score 10    10   Global Physical Health Score 14    14   PROMIS TOTAL - SUBSCORES 24    24       Referral / Collaboration:  Referral to another professional/service is not indicated at this time..    Anticipated number of session for this episode of care: 5-6  Anticipation frequency of session: As determined by Dr. Tapia  Anticipated Duration of each session: 16-37 minutes  Treatment plan will be reviewed in 90 days or when goals have been changed.       MeasurableTreatment Goal(s) related to diagnosis /  functional impairment(s)  Goal 1: Patient will work with providers to manage symptoms    I will know I've met my goal when I have some better skills for the anxiety.      Objective #A (Patient Action)  Patient will attend all appointments, take medication as prescribed.  Status: New - Date: 08/17/2023     Intervention(s)  [facility] will Monitor and assist in overcoming barriers to treatment adherence    Objective #B  Patient will consider all recommendations offered.  Status: New - Date: 08/17/2023      Intervention(s)  [facility] will educate patient on treatment options, clarify concerns, work with pt to overcome any resistance to compliance.      Patient has reviewed and agreed to the above plan.      Bogdan Wilson PsyD  05/17/2023

## 2023-05-17 NOTE — PATIENT INSTRUCTIONS
Treatment Plan:  Continue Vyvanse 30 mg daily for ADHD. OK to take days off from your medication if desired.   Continue Vyvanse 10 mg daily as needed to take WITH your 30 mg dose for ADHD symptoms.     Continue clonidine 0.05-0.1 mg at bedtime as needed for sleep, ADHD.  Continue all other cares per primary care provider.   Continue all other medications as reviewed per electronic medical record today.   Safety plan reviewed. To the Emergency Department as needed or call after hours crisis line at [phone] or [phone]. [place] Crisis Text Line. Text [place] to 806474 or Suicide LifeLine Chat: suicidepreventionlifeline.org/chat  Psychotherapy as needed.  Schedule an appointment with me in 3 months or sooner as needed. Call [facility] at [phone] to schedule.  Follow up with primary care provider as planned or for acute medical concerns.  Call the psychiatric nurse line with medication questions or concerns at [phone].  [facility] may be used to communicate with your provider, but this is not intended to be used for emergencies.    Have previously discussed risks of stimulant medication including, but not limited to, decreased appetite, risk of tics (and that they may be lasting), trouble sleeping, cardiac risks such as increased heart rate and blood pressure, and rare risk of sudden cardiac death.  Also risk of addiction/tolerance/dependence.    Patient Education   Collaborative Care Psychiatry Service  What to Expect  Here's what to expect from your Collaborative Care Psychiatry Service (CCPS).   About CCPS  CCPS means 2 people work together to help you get better. You'll meet with a behavioral health clinician and a psychiatric doctor. A behavioral health clinician helps people with mental health problems by talking with them. A psychiatric doctor helps people by giving them medicine.  How it works  At every visit, you'll see the behavioral health clinician (BHC) first. They'll talk  "with you about how you're doing and teach you how to feel better.   Then you'll see the psychiatric doctor. This doctor can help you deal with troubling thoughts and feelings by giving you medicine. They'll make sure you know the plan for your care.   CCPS usually takes 3 to 6 visits. If you need more visits, we may have you start seeing a different psychiatric doctor for ongoing care.  If you have any questions or concerns, we'll be glad to talk with you.  About visits  Be open  At your visits, please talk openly about your problems. It may feel hard, but it's the best way for us to help you.  Cancelling visits  If you can't come to your visit, please call us right away at [phone]. If you don't cancel at least 24 hours (1 full day) before your visit, that's \"late cancellation.\"  Being late to visits  Being very late is the same as not showing up. You will be a \"no show\" if:  Your appointment starts with a [facility], and you're more than 15 minutes late for a 30-minute (half hour) visit. This will also cancel your appointment with the psychiatric doctor.  Your appointment is with a psychiatric doctor only, and you're more than 15 minutes late for a 30-minute (half hour) visit.  Your appointment is with a psychiatric doctor only, and you're more than 30 minutes late for a 60-minute (full hour) visit.  If you cancel late or don't show up 2 times within 6 months, we may end your care.   Getting help between visits  If you need help between visits, you can call us Monday to Friday from 8 a.m. to 4:30 p.m. at [phone].  Emergency care  Call 911 or go to the nearest emergency department if your life or someone else's life is in danger.  Call 508 anytime to reach the national Suicide and Crisis hotline.  Medicine refills  To refill your medicine, call your pharmacy. You can also call [facility]'s Behavioral Access at [phone], Monday to Friday, 8 a.m. to 4:30 p.m. It can take 1 to 3 business days to get " a refill.   Forms, letters, and tests  You may have papers to fill out, like FMLA, short-term disability, and workability. We can help you with these forms at your visits, but you must have an appointment. You may need more than 1 visit for this, to be in an intensive therapy program, or both.  Before we can give you medicine for ADHD, we may refer you to get tested for it or confirm it another way.  We may not be able to give you an emotional support animal letter.  We don't do mental health checks ordered by the court.   We don't do mental health testing, but we can refer you to get tested.   Thank you for choosing us for your care.  For informational purposes only. Not to replace the advice of your health care provider. Copyright   2022 [facility]. All rights reserved. [employer] [id number] - 12/22.

## 2023-05-17 NOTE — PROGRESS NOTES
"Telemedicine Visit: The patient's condition can be safely assessed and treated via synchronous audio and visual telemedicine encounter.      Reason for Telemedicine Visit: Patient has requested telehealth visit    Originating Site (Patient Location): Patient's home    Distant Location (provider location):  On-site    Consent:  The patient/guardian has verbally consented to: the potential risks and benefits of telemedicine (video visit) versus in person care; bill my insurance or make self-payment for services provided; and responsibility for payment of non-covered services.     Mode of Communication:  Video Conference via [employer]    As the provider I attest to compliance with applicable laws and regulations related to telemedicine.         Outpatient Psychiatric Progress Note    Name: Allyssa Pedersen   : 1970                    Primary Care Provider: Melody De Guzman MD   Therapist: None    PHQ-9 scores:      2020     9:00 AM 2023    10:48 AM   PHQ-9 SCORE   PHQ-9 Total Score MyChart  8 (Mild depression)   PHQ-9 Total Score 5 8    8       KRISTINE-7 scores:      2023    11:25 AM   KRISTINE-7 SCORE   Total Score 10 (moderate anxiety)   Total Score 10    10       Patient Identification:  Patient is a 52 year old,   White Not  or  female  who presents for return visit with me.  Patient is currently a homemaker. Patient attended the phone/video session alone. Patient prefers to be called: \"Allyssa\".    Interim History:  I last saw Allyssa Pedersen for outpatient psychiatry Consultation on 2023. During that appointment, we:      Continue Vyvanse 30 mg daily for ADHD. OK to take days off fro your medication if desired.     Start Vyvanse 10 mg daily as needed to take WITH your 30 mg dose for ADHD symptoms.     Start clonidine 0.1 mg at bedtime as needed for sleep, ADHD.     Continue therapy as planned/needed.    : Patient overall doing quite well.  Has used the extra Vyvanse dose a few " times.  Likes the flexibility of not needing to take extra dose every day.  Has actually been sleeping quite well lately and has not used clonidine.  Tolerated higher dose of Vyvanse well with no negative side effects.  No acute safety concerns.  No SI.  No problematic drug or alcohol use.  Talked with [facility] about therapy today.    Per [facility], Dr. Shane Wilson, during today's team-based visit:  She reported that she did not start the clondine because she had been sleeping just fine until the last couple of days. She also expressed her desire to take as few as medications as she can. She would like to know if she should take them daily or if it is ok to take as needed which will be addressed by Dr. Tapia. The booster dose of Vyvanse has been helpful as needed. She does not think that it has interfered with her sleep or caused any other issues with it. She estimated taking it only 3 days a month. Reported that she has looked into the resources that were sent to her last time. We discussed how she can implement them.    Past Psychiatric Med Trials:  Psych Meds at Intake:  Vyvanse 30 mg daily for ADHD     Past Psych Meds:  Adderall    Psychiatric ROS:  Allyssa Pedersen reports mood has been: stable  Anxiety has been: stable  Sleep has been: improved, see HPI above  Lizet sxs: None  Psychosis sxs: None  ADHD/ADD sxs: improved  PTSD sxs: NA  PHQ9 and GAD7 scores were reviewed today if completed.   Medication side effects: Denies  Current stressors include: And see HPI above  Coping mechanisms and supports include: Family, Hobbies and Friends    Current medications include:   Current Outpatient Medications   Medication Sig     cloNIDine (CATAPRES) 0.1 MG tablet Take 0.5-1 tablets (0.05-0.1 mg) by mouth nightly as needed (sleep)     lisdexamfetamine (VYVANSE) 10 MG capsule Take 1 capsule (10 mg) by mouth daily as needed (ADHD)     lisdexamfetamine (VYVANSE) 30 MG capsule Take 1 capsule (30 mg) by mouth daily     No current  facility-administered medications for this visit.       The [place] Prescription Monitoring Program has been reviewed and there are no concerns about diversionary activity for controlled substances at this time.   04/26/2023  1   04/18/2023  Vyvanse 30 MG Capsule  30.00  30 Ka Mon   [id number]   Gra ([id number])   0/0   [place]   04/19/2023  1   04/19/2023  Vyvanse 10 MG Capsule  30.00  30 Al Bau   [id number]   Gra ([id number])   0/0   [place]   03/19/2023  1   01/10/2023  Vyvanse 30 MG Capsule  30.00  30 Ka Mon   [id number]   Gra ([id number])   0/0   Comm Ins   Com[place] coverage [place]       Past Medical/Surgical History:  Past Medical History:   Diagnosis Date     Acute non-recurrent maxillary sinusitis 10/09/2017     Attention deficit disorder     Created by Conversion  Replacement Utility updated for latest IMO load     Enlarged uterus 09/14/2021     Excessive, frequent and irregular menstruation 09/04/2020     Obesity (BMI 30-39.9)     Created by Conversion      Plantar fasciitis, left 09/22/2016     RUQ abdominal pain 11/09/2022     Vaginal discharge 11/09/2022      has a past medical history of Acute non-recurrent maxillary sinusitis (10/09/2017), Attention deficit disorder, Enlarged uterus (09/14/2021), Excessive, frequent and irregular menstruation (09/04/2020), Obesity (BMI 30-39.9), Plantar fasciitis, left (09/22/2016), RUQ abdominal pain (11/09/2022), and Vaginal discharge (11/09/2022).    She has no past medical history of Arthritis, Cancer (H), Cerebral infarction (H), Congestive heart failure (H), COPD (chronic obstructive pulmonary disease) (H), Depressive disorder, Diabetes (H), Glaucoma (increased eye pressure), Heart disease, History of blood transfusion, Hypertension, Kidney stone, Pancreatitis, Seizures (H), Thyroid cancer (H), Thyroid disease, or Uncomplicated asthma.    Social History:  Reviewed. No changes to social history except as noted above in HPI.    Vital Signs:   None. This is phone/video  visit.     Labs:  Most recent laboratory results reviewed and no new labs.     Review of Systems:  10 systems (general, cardiovascular, respiratory, eyes, ENT, endocrine, GI, , M/S, neurological) were reviewed. Most pertinent finding(s) is/are: denies fever, cough, persistent headaches, shortness of breath, chest pain, severe GI symptoms, trouble urinating, severe pain. The remaining systems are all unremarkable.    Mental Status Examination (limited as this is by phone/video):  Appearance: Awake, alert, appears stated age, no acute distress, well-groomed  Attitude:  cooperative, pleasant   Motor: No gross abnormalities observed via video, not formally tested   Oriented to:  person, place, time, and situation  Attention Span and Concentration:  normal  Speech:  clear, coherent, regular rate, rhythm, and volume  Language: intact  Mood:  good  Affect:  appropriate and in normal range and mood congruent  Associations:  no loose associations  Thought Process:  logical, linear and goal oriented  Thought Content:  no evidence of suicidal ideation or homicidal ideation, no evidence of psychotic thought, no auditory hallucinations present and no visual hallucinations present  Recent and Remote Memory:  Intact to interview. Not formally assessed. No amnesia.  Fund of Knowledge: appropriate  Insight:  good  Judgment:  intact, adequate for safety  Impulse Control:  intact    Suicide Risk Assessment:  Today Allyssa Pedersen reports no suicidal ideation. Based on all available evidence including the factors cited above, Allyssa Pedersen does not appear to be at imminent risk for self-harm, does not meet criteria for a 72-hr hold, and therefore remains appropriate for ongoing outpatient level of care.  A thorough assessment of risk factors related to suicide and self-harm have been reviewed and are noted above. The patient convincingly denies suicidality on several occasions. Local community safety resources reviewed for patient to use  if needed. There was no deceit detected, and the patient presented in a manner that was believable.     DSM5 Diagnosis:  ADHD -inattentive type  Anxiety, Unspecified  Insomnia, Unspecified    Medical comorbidities include:   Patient Active Problem List    Diagnosis Date Noted     Mixed hyperlipidemia 02/08/2023     Priority: Medium     Hepatic steatosis 01/10/2023     Priority: Medium     Has daytime drowsiness 11/09/2022     Priority: Medium     Palpitations 11/09/2022     Priority: Medium     Pelvic pain in female 11/09/2022     Priority: Medium     Irregular menses 11/09/2022     Priority: Medium     Encounter for preventive care 08/16/2022     Priority: Medium     ADHD, predominantly inattentive type      Priority: Medium     IUD (intrauterine device) in place 11/18/2016     Priority: Medium     Mirena place '15 @ [facility]         Class 1 obesity due to excess calories without serious comorbidity with body mass index (BMI) of 32.0 to 32.9 in adult      Priority: Medium       Psychosocial & Contextual Factors: see HPI above    Assessment:  From Intake, 4/19/2023:  Allyssa Pedersen is a 52-year-old female with past psychiatric history including depression, anxiety, ADHD-inattentive type who presents today for psychiatric evaluation.  Patient currently doing well on Vyvanse 30 mg daily for ADHD symptoms.  Could potentially use slight optimization of medication and so we will add 10 mg capsule to take as needed to trial up to 40 mg daily.  Discussed does not need to take stimulant medication daily if would like to take days off here and there.  Clonidine started to take as needed at bedtime to help with sleep.  Discussed risks and benefits of therapy.  No other acute mental health concerns or safety concerns at this time.  No SI.  No problematic drug or alcohol use.  Could consider psychotherapy as needed for symptoms.     5/17/2023:  Patient doing well.  Increased dose of Vyvanse helpful on the days she needed an  extra boost.  Tolerating well with no negative side effects.  Continues to like the flexibility of not needing a higher dose every day.  We will continue medications as is.  Patient would like to be seen back 1 more time in a few months to ensure things are still on the right track with current medication regimen.  If she starts using 10 mg dose more frequently with 30 mg dose may need to increase overall daily dose to 40 mg total.  No acute safety concerns.  No SI.  No problematic drug or alcohol use.    Medication side effects and alternatives were reviewed. Health promotion activities recommended and reviewed today. All questions addressed. Education and counseling completed regarding risks and benefits of medications and psychotherapy options. Recommend therapy for additional support.     Treatment Plan:    Continue Vyvanse 30 mg daily for ADHD. OK to take days off from your medication if desired.     Continue Vyvanse 10 mg daily as needed to take WITH your 30 mg dose for ADHD symptoms.       Continue clonidine 0.05-0.1 mg at bedtime as needed for sleep, ADHD.    Continue all other cares per primary care provider.     Continue all other medications as reviewed per electronic medical record today.     Safety plan reviewed. To the Emergency Department as needed or call after hours crisis line at [phone] or [phone]. [place] Crisis Text Line. Text [place] to 035231 or Suicide LifeLine Chat: suicidepreventionlifeline.org/chat    Psychotherapy as needed.    Schedule an appointment with me in 3 months or sooner as needed. Call [facility] at [phone] to schedule.    Follow up with primary care provider as planned or for acute medical concerns.    Call the psychiatric nurse line with medication questions or concerns at [phone].    MyChart may be used to communicate with your provider, but this is not intended to be used for emergencies.    Have previously discussed risks of stimulant  medication including, but not limited to, decreased appetite, risk of tics (and that they may be lasting), trouble sleeping, cardiac risks such as increased heart rate and blood pressure, and rare risk of sudden cardiac death.  Also risk of addiction/tolerance/dependence.    Administrative Billing:   Phone Call/Video Duration: 14 Minutes  Start: 11:04a  Stop: 11:18a    Patient Status:  Patient will continue to be seen for ongoing consultation and stabilization.    Signed:   Yasmin Tapia DO  [facility]    Disclaimer: This note consists of symbols derived from keyboarding, dictation and/or voice recognition software. As a result, there may be errors in the script that have gone undetected. Please consider this when interpreting information found in this chart.

## 2023-05-22 ENCOUNTER — OFFICE VISIT (OUTPATIENT)
Dept: SLEEP MEDICINE | Facility: CLINIC | Age: 53
End: 2023-05-22
Payer: COMMERCIAL

## 2023-05-22 VITALS
SYSTOLIC BLOOD PRESSURE: 124 MMHG | OXYGEN SATURATION: 100 % | DIASTOLIC BLOOD PRESSURE: 76 MMHG | WEIGHT: 190 LBS | HEART RATE: 79 BPM | BODY MASS INDEX: 32.61 KG/M2

## 2023-05-22 DIAGNOSIS — F51.04 PSYCHOPHYSIOLOGICAL INSOMNIA: Primary | ICD-10-CM

## 2023-05-22 DIAGNOSIS — R53.83 FATIGUE, UNSPECIFIED TYPE: ICD-10-CM

## 2023-05-22 PROCEDURE — 99213 OFFICE O/P EST LOW 20 MIN: CPT | Performed by: NURSE PRACTITIONER

## 2023-05-22 ASSESSMENT — SLEEP AND FATIGUE QUESTIONNAIRES
HOW LIKELY ARE YOU TO NOD OFF OR FALL ASLEEP WHILE WATCHING TV: SLIGHT CHANCE OF DOZING
HOW LIKELY ARE YOU TO NOD OFF OR FALL ASLEEP WHILE SITTING AND TALKING TO SOMEONE: WOULD NEVER DOZE
HOW LIKELY ARE YOU TO NOD OFF OR FALL ASLEEP IN A CAR, WHILE STOPPED FOR A FEW MINUTES IN TRAFFIC: WOULD NEVER DOZE
HOW LIKELY ARE YOU TO NOD OFF OR FALL ASLEEP WHILE SITTING INACTIVE IN A PUBLIC PLACE: WOULD NEVER DOZE
HOW LIKELY ARE YOU TO NOD OFF OR FALL ASLEEP WHEN YOU ARE A PASSENGER IN A CAR FOR AN HOUR WITHOUT A BREAK: SLIGHT CHANCE OF DOZING
HOW LIKELY ARE YOU TO NOD OFF OR FALL ASLEEP WHILE SITTING QUIETLY AFTER LUNCH WITHOUT ALCOHOL: SLIGHT CHANCE OF DOZING
HOW LIKELY ARE YOU TO NOD OFF OR FALL ASLEEP WHILE LYING DOWN TO REST IN THE AFTERNOON WHEN CIRCUMSTANCES PERMIT: MODERATE CHANCE OF DOZING
HOW LIKELY ARE YOU TO NOD OFF OR FALL ASLEEP WHILE SITTING AND READING: HIGH CHANCE OF DOZING

## 2023-05-22 NOTE — PATIENT INSTRUCTIONS
Insomia  You have symptoms of insomnia and would likely benefit from non-medication approaches to treatment.  Cognitive behavioral treatment (CBT) for insomnia is a very effective technique that involves changing your behaviors and thoughts associated with sleep.  People that are motivated to make changes in their sleep pattern are likely to benefit from internet based cognitive behavioral treatment for insomnia.  Internet CBT programs include but are not limited to www[employer] [facility] or www.InvertirOnline.com.  There is a fee for using these programs that is similar to actually seeing an insomnia expert.  By entering the code  [place]  it will allow us to know if our patients find these services useful.      Online Programs   wwwProcured Health (pronounced shut eye). There is a fee for this program. Enter the [employer]  if you decide to enroll in this program.    www.sleepIO.com (pronounced sleep ee oh). There is a fee for this program. Enter the [employer]  if you decide to enroll in this program.      Insomnia  You have symptoms of insomnia and would likely benefit from non-medication approaches to treatment.  Cognitive behavioral treatment (CBT) for insomnia is a very effective technique that involves changing your behaviors and thoughts associated with sleep.  You would likely benefit from scheduling an appointment with our insomnia expert:      DIRECTIONS   Completion of a sleep diary is an essential part of evaluating and treating your insomnia.  Fill out ONCE each day first thing after you get up.  The information you provide is an estimate based on your recollection of the past 24 hours.     DATE  12/4-example          DAY OF WEEK Mon          Did you work today?  Y/N Y          If so, what shift did you work?  D = Day      E = Evening      N = Night D          Time you got  into bed                                           10:45 PM          Time you got up for the day                                    6:15 AM          Total  time in bed 7    hrs          How long did it take you to fall asleep after getting into bed? 45 min          How many times did you wake up after falling asleep not including your final awakening?     3          How long were you awake because of   these awakenings? 60 min          How long were you awake before you   got out of bed for the day? 0 min                     How many caffeinated beverages did you drink within six hours of going to bed? 1          How many alcoholic beverages did you drink within 3 hours of going to bed? 0          How much time did you spend napping or dozing yesterday? 30 min          List any prescription or over-the-counter sleep medication you used with dose and time taken None               Your BMI is Body mass index is 32.61 kg/m .    What is BMI?  Body mass index (BMI) is one way to tell whether you are at a healthy weight, overweight, or obese. It measures your weight in relation to your height.  A BMI of 18.5 to 24.9 is in the healthy range. A person with a BMI of 25 to 29.9 is considered overweight, and someone with a BMI of 30 or greater is considered obese.  Another way to find out if you are at risk for health problems caused by overweight and obesity is to measure your waist. If you are a woman and your waist is more than 35 inches, or if you are a man and your waist is more than 40 inches, your risk of disease may be higher.  More than two-thirds of American adults are considered overweight or obese. Being overweight or obese increases the risk for further weight gain.  Excess weight may lead to heart disease and diabetes. Creating and following plans for healthy eating and physical activity may help you improve your health.    Methods for maintaining or losing weight.  Weight control is part of healthy lifestyle and includes exercise, emotional health, and healthy eating habits.  Careful eating habits lifelong is the mainstay of weight  control.  Though there are significant health benefits from weight loss, long-term weight loss with diet alone may be very difficult to achieve- studies show long-term success with dietary management in less than 10% of people. Attaining a healthy weight may be especially difficult to achieve in those with severe obesity. In some cases, medications, devices and surgical management might be considered.    What can you do?  If you are overweight or obese and are interested in methods for weight loss, you should discuss this with your provider. In addition, we recommend that you review healthy life styles and methods for weight loss available through the National Institutes of Health patient information sites:   http://win.niddk.nih.gov/publications/index.htm         Drive Safe... Drive Alive     Sleep health profoundly affects your health, mood, and your safety. 33% of the population (one in three of us) is not getting enough sleep and many have a sleep disorder. Not getting enough sleep or having an untreated / undertreated sleep condition may make us sleepy without even knowing it. In fact, our driving could be dramatically impaired due to our sleep health. As your provider, here are some things I would like you to know about driving:     Here are some warning signs for impairment and dangerous drowsy driving:              -Having been awake more than 16 hours               -Looking tired               -Eyelid drooping              -Head nodding (it could be too late at this point)              -Driving for more than 30 minutes     Some things you could do to make the driving safer if you are experiencing some drowsiness:              -Stop driving and rest              -Call for transportation              -Make sure your sleep disorder is adequately treated     Some things that have been shown NOT to work when experiencing drowsiness while driving:              -Turning on the radio              -Opening windows               -Eating any  distracting  /  entertaining  foods (e.g., sunflower seeds, candy, or any other)              -Talking on the phone      Your decision may not only impact your life, but also the life of others. Please, remember to drive safe for yourself and all of us.       Ismael Virgen, PhD   Insomnia    [facility]   [facility]: [phone]  [facility]: [phone]

## 2023-05-22 NOTE — NURSING NOTE
"Chief Complaint   Patient presents with     Study Results       Initial /76   Pulse 79   Wt 86.2 kg (190 lb)   SpO2 100%   BMI 32.61 kg/m   Estimated body mass index is 32.61 kg/m  as calculated from the following:    Height as of 5/9/23: 1.626 m (5' 4\").    Weight as of this encounter: 86.2 kg (190 lb).    Medication Reconciliation: complete    CHAITANYA Mayer [facility]    "

## 2023-05-22 NOTE — PROGRESS NOTES
"Sleep Study Follow-Up Visit:    Date on this visit: 5/22/2023    Allyssa Pedersen comes in today for follow-up of her home sleep study completed on the evening of 5/9/2023.    Estimated body mass index is 31.93 kg/m  as calculated from the following:    Height as of this encounter: 1.626 m (5' 4\").    Weight as of this encounter: 84.4 kg (186 lb).  Total score - [place]: 8 (5/9/2023  2:14 PM)  Total Score: 2 (5/9/2023  2:15 PM)    Analysis time: 410.1 minutes    SaO2 baseline: 94%  SaO2 thomas: 91%  Time with saturation less than/equal to 88%: 0 minut  Respiratory events: The home study revealed a presence of 0 obstructive apneas and 0 mixed and central apneas. There were 3 hypopneas resulting in a combined apnea/hypopnea index [AHI] of 0.4 events per hour.  AHI was 0.4 per hour supine, N/A per hour prone, N/A per hour on left side, and N/A per hour on right side.   Position:   Percent of time spent:    Supine: 100%   Prone: 0%   Left lateral: 0%   Right lateral: 0%  These findings were reviewed with patient.     Past medical/surgical history, family history, social history, medications and allergies were reviewed.      Problem List:  Patient Active Problem List    Diagnosis Date Noted     Mixed hyperlipidemia 02/08/2023     Priority: Medium     Hepatic steatosis 01/10/2023     Priority: Medium     Has daytime drowsiness 11/09/2022     Priority: Medium     Palpitations 11/09/2022     Priority: Medium     Pelvic pain in female 11/09/2022     Priority: Medium     Irregular menses 11/09/2022     Priority: Medium     Encounter for preventive care 08/16/2022     Priority: Medium     ADHD, predominantly inattentive type      Priority: Medium     IUD (intrauterine device) in place 11/18/2016     Priority: Medium     Mirena place '15 @ [facility]         Class 1 obesity due to excess calories without serious comorbidity with body mass index (BMI) of 32.0 to 32.9 in adult      Priority: Medium        Impression/Plan:  Assessment: " Patient did not rule in for obstructive sleep apnea    Insomnia  Fatigue   Referral will be made to clinical sleep psychologist for input regarding insomnia   Patient instructed to review sleep hygiene practices for any areas or methods which can be mitigated to minimize sleep disturbance   Recommend patient bring BMI to 30.0 or less   Recommend patient employ safe driving practices such as not driving a car if she becomes drowsy   Patient may follow-up in the sleep medicine clinic on an as-needed basis        20 minutes spent with patient, all of which were spent face-to-face counseling, consulting, coordinating plan of care.      SARITHA Garrido, CNP  Sleep Medicine    This note was written with the assistance of the Dragon voice-dictation technology software. The final document, although reviewed, may contain errors. For corrections, please contact the office.      CC: Melody De Guzman

## 2023-08-01 ENCOUNTER — ANCILLARY PROCEDURE (OUTPATIENT)
Dept: MAMMOGRAPHY | Facility: CLINIC | Age: 53
End: 2023-08-01
Attending: FAMILY MEDICINE
Payer: COMMERCIAL

## 2023-08-01 DIAGNOSIS — Z12.31 VISIT FOR SCREENING MAMMOGRAM: ICD-10-CM

## 2023-08-01 PROCEDURE — 77067 SCR MAMMO BI INCL CAD: CPT

## 2023-08-18 ENCOUNTER — VIRTUAL VISIT (OUTPATIENT)
Dept: PSYCHIATRY | Facility: CLINIC | Age: 53
End: 2023-08-18
Payer: COMMERCIAL

## 2023-08-18 DIAGNOSIS — F41.9 ANXIETY: ICD-10-CM

## 2023-08-18 DIAGNOSIS — F90.0 ADHD, PREDOMINANTLY INATTENTIVE TYPE: Primary | ICD-10-CM

## 2023-08-18 DIAGNOSIS — G47.00 INSOMNIA, UNSPECIFIED TYPE: ICD-10-CM

## 2023-08-18 PROCEDURE — 99214 OFFICE O/P EST MOD 30 MIN: CPT | Mod: VID | Performed by: PSYCHIATRY & NEUROLOGY

## 2023-08-18 RX ORDER — LISDEXAMFETAMINE DIMESYLATE 30 MG/1
30 CAPSULE ORAL DAILY
Qty: 30 CAPSULE | Refills: 0 | Status: SHIPPED | OUTPATIENT
Start: 2023-10-23 | End: 2023-11-22

## 2023-08-18 RX ORDER — LISDEXAMFETAMINE DIMESYLATE 30 MG/1
30 CAPSULE ORAL DAILY
Qty: 30 CAPSULE | Refills: 0 | Status: SHIPPED | OUTPATIENT
Start: 2023-09-23 | End: 2023-10-23

## 2023-08-18 RX ORDER — LISDEXAMFETAMINE DIMESYLATE 30 MG/1
30 CAPSULE ORAL DAILY
Qty: 30 CAPSULE | Refills: 0 | Status: SHIPPED | OUTPATIENT
Start: 2023-08-24 | End: 2023-09-23

## 2023-08-18 RX ORDER — LISDEXAMFETAMINE DIMESYLATE 10 MG/1
10 CAPSULE ORAL DAILY PRN
Qty: 30 CAPSULE | Refills: 0 | Status: SHIPPED | OUTPATIENT
Start: 2023-09-18 | End: 2023-10-18

## 2023-08-18 RX ORDER — LISDEXAMFETAMINE DIMESYLATE 10 MG/1
10 CAPSULE ORAL DAILY PRN
Qty: 30 CAPSULE | Refills: 0 | Status: SHIPPED | OUTPATIENT
Start: 2023-08-18 | End: 2023-09-17

## 2023-08-18 RX ORDER — LISDEXAMFETAMINE DIMESYLATE 10 MG/1
10 CAPSULE ORAL DAILY PRN
Qty: 30 CAPSULE | Refills: 0 | Status: SHIPPED | OUTPATIENT
Start: 2023-10-19 | End: 2023-11-18

## 2023-08-18 ASSESSMENT — PAIN SCALES - GENERAL: PAINLEVEL: NO PAIN (0)

## 2023-08-18 NOTE — NURSING NOTE
Is the patient currently in the state of [place]? YES    Visit mode:VIDEO    If the visit is dropped, the patient can be reconnected by: VIDEO VISIT: Text to cell phone:   Telephone Information:   Mobile [phone]       Will anyone else be joining the visit? No  (If patient encounters technical issues they should call [phone])    How would you like to obtain your AVS? MyChart    Are changes needed to the allergy or medication list? No, Pt stated no changes to allergies, and Pt stated no med changes    Rooming Documentation: Assigned questionnaire(s) completed .    Reason for visit: DESTINY Norris

## 2023-08-18 NOTE — PROGRESS NOTES
"  Telemedicine Visit: The patient's condition can be safely assessed and treated via synchronous audio and visual telemedicine encounter.      Reason for Telemedicine Visit: Patient has requested telehealth visit    Originating Site (Patient Location): Patient's home    Distant Location (provider location):  Off-Site    Consent:  The patient/guardian has verbally consented to: the potential risks and benefits of telemedicine (video visit) versus in person care; bill my insurance or make self-payment for services provided; and responsibility for payment of non-covered services.     Mode of Communication:  Video Conference via [employer]    As the provider I attest to compliance with applicable laws and regulations related to telemedicine.         Outpatient Psychiatric Progress Note    Name: Allyssa Pedersen   : 1970                    Primary Care Provider: Melody De Guzman MD   Therapist: None    PHQ-9 scores:      2020     9:00 AM 2023    10:48 AM   PHQ-9 SCORE   PHQ-9 Total Score MyChart  8 (Mild depression)   PHQ-9 Total Score 5 8    8       KRISTINE-7 scores:      2023    11:25 AM   KRISTINE-7 SCORE   Total Score 10 (moderate anxiety)   Total Score 10    10       Patient Identification:  Patient is a 53 year old,   White Not  or  female  who presents for return visit with me.  Patient is currently  a homemaker . Patient attended the phone/video session alone. Patient prefers to be called: \"Allyssa\".    Interim History:  I last saw Allyssa Pedersen for outpatient psychiatry return visit on 2023. During that appointment, we:    Continue Vyvanse 30 mg daily for ADHD. OK to take days off from your medication if desired.   Continue Vyvanse 10 mg daily as needed to take WITH your 30 mg dose for ADHD symptoms.     Continue clonidine 0.05-0.1 mg at bedtime as needed for sleep, ADHD.  Continue all other cares per primary care provider.     818: Patient overall doing well.  Symptoms stable on " Vyvanse 30 mg daily with occasional use of 10 mg in addition to the 30 mg dose.  Continues to feel a little bit more scattered a few days before menses.  The additional 10 mg dose of Vyvanse is helpful and well tolerated.  Denies any negative side effects.  Sleeps better the days she takes her stimulant medication.  Has not yet trialed clonidine since overall no major sleep problems.  Has had a couple more issues more recently due to planning an upcoming party.  Patient may consider trialing the medication now.  No acute safety concerns.  No SI.  No problematic drug or alcohol use.    Past Psychiatric Med Trials:  Psych Meds at Intake:  Vyvanse 30 mg daily for ADHD     Past Psych Meds:  Adderall    Psychiatric ROS:  Allyssa Pedersen reports mood has been: stable  Anxiety has been: stable  Sleep has been: overall stable  Lizet sxs: None  Psychosis sxs: None  ADHD/ADD sxs: stable  PTSD sxs: NA  PHQ9 and GAD7 scores were reviewed today if completed.   Medication side effects: Denies  Current stressors include: And see HPI above  Coping mechanisms and supports include: Family, Hobbies and Friends    Current medications include:   Current Outpatient Medications   Medication Sig    cloNIDine (CATAPRES) 0.1 MG tablet Take 0.5-1 tablets (0.05-0.1 mg) by mouth nightly as needed (sleep)    lisdexamfetamine (VYVANSE) 10 MG capsule Take 1 capsule (10 mg) by mouth daily as needed (ADHD)    lisdexamfetamine (VYVANSE) 30 MG capsule Take 1 capsule (30 mg) by mouth daily     No current facility-administered medications for this visit.       The [place] Prescription Monitoring Program has been reviewed and there are no concerns about diversionary activity for controlled substances at this time.   07/25/2023 05/17/2023 1 Vyvanse 30 Mg Capsule 30.00 30 Al Bau [id number] Gra (8169) 0/0  [place] Ins [place]   06/23/2023 05/17/2023 1 Vyvanse 10 Mg Capsule 30.00 30 Al Bau [id number] Gra (8169) 0/0  [place]   06/23/2023 05/17/2023 1 Vyvanse 30 Mg  Capsule 30.00 30 Al Bau [id number] Gra (8169) 0/0  [place]   05/23/2023 05/17/2023 1 Vyvanse 30 Mg Capsule 30.00 30 Al Bau [id number] Gra (8169) 0/0  [place]     Past Medical/Surgical History:  Past Medical History:   Diagnosis Date    Acute non-recurrent maxillary sinusitis 10/09/2017    Attention deficit disorder     Created by Conversion  Replacement Utility updated for latest IMO load    Enlarged uterus 09/14/2021    Excessive, frequent and irregular menstruation 09/04/2020    Obesity (BMI 30-39.9)     Created by Conversion     Plantar fasciitis, left 09/22/2016    RUQ abdominal pain 11/09/2022    Vaginal discharge 11/09/2022      has a past medical history of Acute non-recurrent maxillary sinusitis (10/09/2017), Attention deficit disorder, Enlarged uterus (09/14/2021), Excessive, frequent and irregular menstruation (09/04/2020), Obesity (BMI 30-39.9), Plantar fasciitis, left (09/22/2016), RUQ abdominal pain (11/09/2022), and Vaginal discharge (11/09/2022).    She has no past medical history of Arthritis, Cancer (H), Cerebral infarction (H), Congestive heart failure (H), COPD (chronic obstructive pulmonary disease) (H), Depressive disorder, Diabetes (H), Glaucoma (increased eye pressure), Heart disease, History of blood transfusion, Hypertension, Kidney stone, Pancreatitis, Seizures (H), Thyroid cancer (H), Thyroid disease, or Uncomplicated asthma.    Social History:  Reviewed. No changes to social history except as noted above in HPI.    Vital Signs:   None. This is phone/video visit.     Labs:  Most recent laboratory results reviewed and no new labs.     Review of Systems:  10 systems (general, cardiovascular, respiratory, eyes, ENT, endocrine, GI, , M/S, neurological) were reviewed. Most pertinent finding(s) is/are: denies fever, cough, persistent headaches, shortness of breath, chest pain, severe GI symptoms, trouble urinating, severe pain. The remaining systems are all unremarkable.    Mental Status  Examination (limited as this is by phone/video):  Appearance: Awake, alert, appears stated age, no acute distress, well-groomed  Attitude:  cooperative, pleasant   Motor: No gross abnormalities observed via video, not formally tested   Oriented to:  person, place, time, and situation  Attention Span and Concentration:  normal  Speech:  clear, coherent, regular rate, rhythm, and volume  Language: intact  Mood:  good  Affect:  appropriate and in normal range and mood congruent  Associations:  no loose associations  Thought Process:  logical, linear and goal oriented  Thought Content:  no evidence of suicidal ideation or homicidal ideation, no evidence of psychotic thought, no auditory hallucinations present and no visual hallucinations present  Recent and Remote Memory:  Intact to interview. Not formally assessed. No amnesia.  Fund of Knowledge: appropriate  Insight:  good  Judgment:  intact, adequate for safety  Impulse Control:  intact    Suicide Risk Assessment:  Today Allyssa Pedersen reports no suicidal ideation. Based on all available evidence including the factors cited above, Allyssa Pedersen does not appear to be at imminent risk for self-harm, does not meet criteria for a 72-hr hold, and therefore remains appropriate for ongoing outpatient level of care.  A thorough assessment of risk factors related to suicide and self-harm have been reviewed and are noted above. The patient convincingly denies suicidality on several occasions. Local community safety resources reviewed for patient to use if needed. There was no deceit detected, and the patient presented in a manner that was believable.     DSM5 Diagnosis:  ADHD -inattentive type  Anxiety, Unspecified  Insomnia, Unspecified    Medical comorbidities include:   Patient Active Problem List    Diagnosis Date Noted    Mixed hyperlipidemia 02/08/2023     Priority: Medium    Hepatic steatosis 01/10/2023     Priority: Medium    Has daytime drowsiness 11/09/2022     Priority:  Medium    Palpitations 11/09/2022     Priority: Medium    Pelvic pain in female 11/09/2022     Priority: Medium    Irregular menses 11/09/2022     Priority: Medium    Encounter for preventive care 08/16/2022     Priority: Medium    ADHD, predominantly inattentive type      Priority: Medium    IUD (intrauterine device) in place 11/18/2016     Priority: Medium     Mirena place '15 @ [facility]        Class 1 obesity due to excess calories without serious comorbidity with body mass index (BMI) of 32.0 to 32.9 in adult      Priority: Medium       Psychosocial & Contextual Factors: see HPI above    Assessment:  From Intake, 4/19/2023:  Allyssa Pedersen is a 52-year-old female with past psychiatric history including depression, anxiety, ADHD-inattentive type who presents today for psychiatric evaluation.  Patient currently doing well on Vyvanse 30 mg daily for ADHD symptoms.  Could potentially use slight optimization of medication and so we will add 10 mg capsule to take as needed to trial up to 40 mg daily.  Discussed does not need to take stimulant medication daily if would like to take days off here and there.  Clonidine started to take as needed at bedtime to help with sleep.  Discussed risks and benefits of therapy.  No other acute mental health concerns or safety concerns at this time.  No SI.  No problematic drug or alcohol use.  Could consider psychotherapy as needed for symptoms.     5/17/2023:  Patient doing well.  Increased dose of Vyvanse helpful on the days she needed an extra boost.  Tolerating well with no negative side effects.  Continues to like the flexibility of not needing a higher dose every day.  We will continue medications as is.  Patient would like to be seen back 1 more time in a few months to ensure things are still on the right track with current medication regimen.  If she starts using 10 mg dose more frequently with 30 mg dose may need to increase overall daily dose to 40 mg total.  No acute  safety concerns.  No SI.  No problematic drug or alcohol use.    8/18/2023:  Patient overall doing well.  Stable on current medication regimen.  No major negative side effects.  Due to ongoing stability and no further changes to medications, patient's care will be returned back to primary care provider for ongoing management.  No safety concerns.  No problematic drug or alcohol use.    Medication side effects and alternatives were reviewed. Health promotion activities recommended and reviewed today. All questions addressed. Education and counseling completed regarding risks and benefits of medications and psychotherapy options. Recommend therapy for additional support.     Treatment Plan:  Continue Vyvanse 30 mg daily for ADHD. OK to take days off from your medication if desired.   Continue Vyvanse 10 mg daily as needed to take WITH your 30 mg dose for ADHD symptoms.     Continue clonidine 0.05-0.1 mg at bedtime as needed for sleep, ADHD.  Your psychiatric care is being returned back to your primary care provider for ongoing management.  Please reach out to your primary care provider with any questions or concerns about your mental health or mental health medications.  Continue all other cares per primary care provider.   Continue all other medications as reviewed per electronic medical record today.   Safety plan reviewed. To the Emergency Department as needed or call after hours crisis line at [phone] or [phone]. [place] Crisis Text Line. Text [place] to 910791 or Suicide LifeLine Chat: suicidepreventionlifeline.org/chat  Psychotherapy as needed.  Follow up with primary care provider as planned or for acute medical concerns.  MyChart may be used to communicate with your provider, but this is not intended to be used for emergencies.    Thank you for our work together in the Psychiatry Collaborative Care Model at [facility]. This is our last visit and I am returning your care back to your Primary  Care Provider Melody De Guzman MD . If you are not doing well, please contact your Primary Care Provider office.    Have previously discussed risks of stimulant medication including, but not limited to, decreased appetite, risk of tics (and that they may be lasting), trouble sleeping, cardiac risks such as increased heart rate and blood pressure, and rare risk of sudden cardiac death.  Also risk of addiction/tolerance/dependence.    Administrative Billing:   Phone Call/Video Duration: 24 Minutes  Start: 10:30a  Stop: 10:54a    Patient Status:  The patient is being returned to the referring provider for ongoing care and medication prescribing.  The patient can be re-referred back to this service for further consultation if needed in the future.      Signed:   Yasmin Tapia DO  [facility]    Disclaimer: This note consists of symbols derived from keyboarding, dictation and/or voice recognition software. As a result, there may be errors in the script that have gone undetected. Please consider this when interpreting information found in this chart.

## 2023-08-18 NOTE — Clinical Note
Psychiatry Update/Consult. I am returning Allyssa Pedersen's psychiatric care back to you for ongoing management and medication prescribing.  Patient has graduated from [facility] due to ongoing stability and readiness to return to primary care provider. Future medication refills will come from you (I gave 3 months today). I recommended that the patient follow up with you in about 3 months for a mental health visit. More details about treatment plan are in my note. If you have any questions or concerns, please don't hesitate to reach out.   Thanks for the referral! It was a pleasure working with Allyssa Pedersen.   Yasmin Tapia, DO Collaborative Care Psychiatry

## 2023-08-18 NOTE — PATIENT INSTRUCTIONS
Treatment Plan:  Continue Vyvanse 30 mg daily for ADHD. OK to take days off from your medication if desired.   Continue Vyvanse 10 mg daily as needed to take WITH your 30 mg dose for ADHD symptoms.     Continue clonidine 0.05-0.1 mg at bedtime as needed for sleep, ADHD.  Your psychiatric care is being returned back to your primary care provider for ongoing management.  Please reach out to your primary care provider with any questions or concerns about your mental health or mental health medications.  Continue all other cares per primary care provider.   Continue all other medications as reviewed per electronic medical record today.   Safety plan reviewed. To the Emergency Department as needed or call after hours crisis line at [phone] or [phone]. [place] Crisis Text Line. Text [place] to 516269 or Suicide LifeLine Chat: suicidepreventionlifeline.org/chat  Psychotherapy as needed.  Follow up with primary care provider as planned or for acute medical concerns.  MyChart may be used to communicate with your provider, but this is not intended to be used for emergencies.    Thank you for our work together in the Psychiatry Collaborative Care Model at [facility]. This is our last visit and I am returning your care back to your Primary Care Provider Melody De Guzman MD . If you are not doing well, please contact your Primary Care Provider office.    Have previously discussed risks of stimulant medication including, but not limited to, decreased appetite, risk of tics (and that they may be lasting), trouble sleeping, cardiac risks such as increased heart rate and blood pressure, and rare risk of sudden cardiac death.  Also risk of addiction/tolerance/dependence.

## 2023-11-09 DIAGNOSIS — Z12.11 COLON CANCER SCREENING: ICD-10-CM

## 2023-11-15 NOTE — PROGRESS NOTES
Endometrial biopsy, Removal of IUD    Date/Time: 8/28/2020 11:30 AM  Performed by: Alie Mcclendon MD  Authorized by: Alie Mcclendon MD   Consent: Verbal consent obtained. Written consent obtained.  Risks and benefits: risks, benefits and alternatives were discussed  Consent given by: patient  Comments: Endometrial Biopsy / IUD Removal Procedure Note    Pre-operative Diagnosis: Frequent menstrual bleeding, IUD in place    Post-operative Diagnosis: Frequent menstrual bleeding, enlarged uterus, IUD removed.    History: Patient has an IUD in place that is due for removal. She had the IUD placed initially due to heavy frequent menses. It did help with her symptoms but she has had recurrence and at this time will bleed for 3 weeks at a time. Bleeding is lighter than it used to be but still frequent. She is scheduled for an ultrasound to evaluate the uterus as well.    Procedure Details   Urine pregnancy test was done today and is negative.  The risks (including infection, bleeding, pain, and uterine perforation) and benefits of the procedure were explained to the patient and written informed consent was obtained.      Bimanual exam: mid-position and enlarged  Speculum placed.  Cervix appears normal.  IUD strings were inside the cervix but were easily teased out with a cytobrush. IUD removed without difficulty with gentle steady pressure. Cervix cleansed with Betadine. Tenaculum applied to the cervix at 12 O'clock and gentle traction applied.  Cervical Os finder used and was passed without difficulty. Uterus sounded to 10 cm. Endometrial biopsy pipelle was inserted through the cervix and up to the uterine fundus.  The pipelle was partially withdrawn and reinserted 3-4 times as it was rotated and sample obtained. Patient tolerated procedure very well.    Condition:  Stable    Complications:  None    Plan:  The patient was instructed to wear a pad as needed.  She should contact us if any heavy bleeding or increasing  Presents from home (lives with daughters) with 1 week history of acute on chronic pain of back, bilateral hips and R knee. Has been unable to ambulate. At baseline, is able to stand/pivot and take some steps with a  walker. Does take Norco chronically for years for back pain. No red flag symptoms. Was seen in ER last week for similar and discharged. CT lumbar spine: severe disc and facet degenerative change throughout the lumbar spine. Multilevel mild to moderate central canal stenosis and moderate to severe neural foraminal narrowing. CT A/P: Moderate amount of stool in the colon may indicate constipation. No evidence of bowel obstruction, colitis or diverticulitis. Advanced disc degenerative change in the lumbar spine. Normal alignment of the left hip arthroplasty.    R Knee xray: severe tricompartmental OA  Was started on pain medications including Ketamine in ER which then required Narcan due to AMS  Check ESR/CRP  APS consulted, regimen adjusted  Placed on scheduled Tylenol 975 mg every 8 hours, lido patch to back, L hip and R knee, Valium 2 mg every 8 hours as needed muscle spasms, Oxy 2.5 mg PO q6h PRN moderate pain, oxy 5 mg PO q6h PRN severe pain, IV dilaudid 0.2 mg q4h PRN breakthrough pain  D/W family, consideration for additional imaging like MRI if able to tolerate (however suspect would need anesthesia which would be high risk given age) however this would not change any management given her age and comorbidities  Eventual PT/OT when pain controlled pain.  Sample sent to pathology and patient will be notified of results.

## 2023-11-23 ENCOUNTER — LAB (OUTPATIENT)
Dept: FAMILY MEDICINE | Facility: CLINIC | Age: 53
End: 2023-11-23
Payer: COMMERCIAL

## 2023-11-23 DIAGNOSIS — Z12.11 COLON CANCER SCREENING: ICD-10-CM

## 2023-12-08 LAB — NONINV COLON CA DNA+OCC BLD SCRN STL QL: NEGATIVE

## 2023-12-23 ENCOUNTER — HEALTH MAINTENANCE LETTER (OUTPATIENT)
Age: 53
End: 2023-12-23

## 2024-01-16 ENCOUNTER — VIRTUAL VISIT (OUTPATIENT)
Dept: FAMILY MEDICINE | Facility: CLINIC | Age: 54
End: 2024-01-16
Payer: COMMERCIAL

## 2024-01-16 DIAGNOSIS — F90.0 ADHD, PREDOMINANTLY INATTENTIVE TYPE: Primary | ICD-10-CM

## 2024-01-16 DIAGNOSIS — Z00.00 ENCOUNTER FOR PREVENTIVE CARE: ICD-10-CM

## 2024-01-16 PROCEDURE — 99213 OFFICE O/P EST LOW 20 MIN: CPT | Mod: 95 | Performed by: FAMILY MEDICINE

## 2024-01-16 RX ORDER — LISDEXAMFETAMINE DIMESYLATE 30 MG/1
30 CAPSULE ORAL EVERY MORNING
Qty: 30 CAPSULE | Refills: 0 | Status: SHIPPED | OUTPATIENT
Start: 2024-01-16 | End: 2024-03-18

## 2024-01-16 RX ORDER — LISDEXAMFETAMINE DIMESYLATE 30 MG/1
30 CAPSULE ORAL EVERY MORNING
COMMUNITY
End: 2024-01-16

## 2024-01-16 NOTE — PROGRESS NOTES
"Allyssa is a 53 year old who is being evaluated via a billable video visit.      How would you like to obtain your AVS? MyChart  If the video visit is dropped, the invitation should be resent by: Text to cell phone: [phone]  Will anyone else be joining your video visit? No    Assessment & Plan   Problem List Items Addressed This Visit          Behavioral    ADHD, predominantly inattentive type - Primary     ADHD-chart is reviewed today the patient had been seeing Trina Tapia DO and psychiatry for this.  She was doing well on Vyvanse 30 mg orally per day with as needed Vyvanse 10 mg p.o. q. afternoon if needed.  Dr. Tapia had also given her some clonidine 0.1 mg to try at night to help with sleep but she had not yet trialed that in August when Dr. Tapia had last seen her.    She desires a refill of her medications  Vyvanse 30 mg p.o. daily is refilled today #30 with no refills  She does not take a prn later in the day due to cost (was previously prescribed vyvanse 10 mg q afternoon prn).  Clonidine 0.1 mg is not refilled as she still has plenty.  Follow-up in 30 days to sign CSA and refill Vyvanse.         Relevant Medications    lisdexamfetamine (VYVANSE) 30 MG capsule       Other    Encounter for preventive care     Annual exam scheduled for April.              BMI:   Estimated body mass index is 32.61 kg/m  as calculated from the following:    Height as of 5/9/23: 1.626 m (5' 4\").    Weight as of 5/22/23: 86.2 kg (190 lb).       Melody De Guzman MD  [facility]    Alessandra Spivey is a 53 year old, presenting for the following health issues:  Med check (Vyvanse)        1/16/2024     8:57 AM   Additional Questions   Roomed by as   Accompanied by self         1/16/2024     8:57 AM   Patient Reported Additional Medications   Patient reports taking the following new medications no       History of Present Illness       Reason for visit:  ADHD check in    She eats 2-3 servings of fruits and " vegetables daily.She consumes 0 sweetened beverage(s) daily.She exercises with enough effort to increase her heart rate 10 to 19 minutes per day.  She exercises with enough effort to increase her heart rate 3 or less days per week. She is missing 7 dose(s) of medications per week.  She is not taking prescribed medications regularly due to other.           Objective       Vitals:  No vitals were obtained today due to virtual visit.    Physical Exam   GENERAL: Healthy, alert and no distress  EYES: Eyes grossly normal to inspection.  No discharge or erythema, or obvious scleral/conjunctival abnormalities.  RESP: No audible wheeze, cough, or visible cyanosis.  No visible retractions or increased work of breathing.    SKIN: Visible skin clear. No significant rash, abnormal pigmentation or lesions.  NEURO: Cranial nerves grossly intact.  Mentation and speech appropriate for age.  PSYCH: Mentation appears normal, affect normal/bright, judgement and insight intact, normal speech and appearance well-groomed.          Video-Visit Details    Type of service:  Video Visit     Originating Location (pt. Location): Home  Distant Location (provider location):  On-site  Platform used for Video Visit: Rosalba

## 2024-01-16 NOTE — ASSESSMENT & PLAN NOTE
ADHD-chart is reviewed today the patient had been seeing Trina Tapia DO and psychiatry for this.  She was doing well on Vyvanse 30 mg orally per day with as needed Vyvanse 10 mg p.o. q. afternoon if needed.  Dr. Tapia had also given her some clonidine 0.1 mg to try at night to help with sleep but she had not yet trialed that in August when Dr. Tapia had last seen her.    She desires a refill of her medications  Vyvanse 30 mg p.o. daily is refilled today #30 with no refills  She does not take a prn later in the day due to cost (was previously prescribed vyvanse 10 mg q afternoon prn).  Clonidine 0.1 mg is not refilled as she still has plenty.  Follow-up in 30 days to sign CSA and refill Vyvanse.

## 2024-01-16 NOTE — LETTER
[facility]  01/16/24  Patient: Allyssa Pedersen  YOB: 1970  Medical Record Number: [id number]                                                                                  Non-Opioid Controlled Substance Agreement    This is an agreement between you and your provider regarding safe and appropriate use of controlled substances prescribed by your care team. Controlled substances are?medicines that can cause physical and mental dependence (abuse).     There are strict laws about having and using these medicines. We here at [facility] are  committed to working with you in your efforts to get better. To support you in this work, we'll help you schedule regular office appointments for medicine refills. If we must cancel or change your appointment for any reason, we'll make sure you have enough medicine to last until your next appointment.     As a Provider, I will:   Listen carefully to your concerns while treating you with respect.   Recommend a treatment plan that I believe is in your best interest and may involve therapies other than medicine.    Talk with you often about the possible benefits and the risk of harm of any medicine that we prescribe for you.  Assess the safety of this medicine and check how well it works.    Provide a plan on how to taper (discontinue or go off) using this medicine if the decision is made to stop its use.      ::  As a Patient, I understand controlled substances:     Are prescribed by my care provider to help me function or work and manage my condition(s).?  Are strong medicines and can cause serious side effects.     Need to be taken exactly as prescribed.?Combining controlled substances with certain medicines or chemicals (such as illegal drugs, alcohol, sedatives, sleeping pills, and benzodiazepines) can be dangerous or even fatal.? If I stop taking my medicines suddenly, I may have severe withdrawal symptoms.     The risks, benefits, and  side effects of these medicine(s) were explained to me. I agree that:    I will take part in other treatments as advised by my care team. This may be psychiatry or counseling, physical therapy, behavioral therapy, group treatment or a referral to specialist.    I will keep all my appointments and understand this is part of the monitoring of controlled substances.?My care team may require an office visit for EVERY controlled substance refill. If I miss appointments or don t follow instructions, my care team may stop my medicine    I will take my medicines as prescribed. I will not change the dose or schedule unless my care team tells me to. There will be no refills if I run out early.      I may be asked to come to the clinic and complete a urine drug test or complete a pill count. If I don t give a urine sample or participate in a pill count, the care team may stop my medicine.    I will only receive controlled substance prescriptions from this clinic. If I am treated by another provider, I will tell them that I am taking controlled substances and that I have a treatment agreement with this provider. I will inform my [facility] care team within one business day if I am given a prescription for any controlled substance by another healthcare provider. My [facility] care team can contact other providers and pharmacists about my use of any medicines.    It is up to me to make sure that I don't run out of my medicines on weekends or holidays.?If my care team is willing to refill my prescription without a visit, I must request refills only during office hours. Refills may take up to 3 business days to process. I will use one pharmacy to fill all my controlled substance prescriptions. I will notify the clinic about any changes to my insurance or medicine availability.    I am responsible for my prescriptions. If the medicine/prescription is lost, stolen or destroyed, it will not be replaced.?I also agree not  to share controlled substance medicines with anyone.     I am aware I should not use any illegal or recreational drugs. I agree not to drink alcohol unless my care team says I can.     If I enroll in the [place] Medical Cannabis program, I will tell my care team before my next refill.    I will tell my care team right away if I become pregnant, have a new medical problem treated outside of my regular clinic, or have a change in my medicines.     I understand that this medicine can affect my thinking, judgment and reaction time.? Alcohol and drugs affect the brain and body, which can affect the safety of my driving. Being under the influence of alcohol or drugs can affect my decision-making, behaviors, personal safety and the safety of others. Driving while impaired (DWI) can occur if a person is driving, operating or in physical control of a car, motorcycle, boat, snowmobile, ATV, motorbike, off-road vehicle or any other motor vehicle ([place] Statute 169A.20). I understand the risk if I choose to drive or operate any vehicle or machinery.    I understand that if I do not follow any of the conditions above, my prescriptions or treatment may be stopped or changed.   I agree that my provider, clinic care team and pharmacy may work with any city, state or federal law enforcement agency that investigates the misuse, sale or other diversion of my controlled medicine. I will allow my provider to discuss my care with, or share a copy of, this agreement with any other treating provider, pharmacy or emergency room where I receive care.     I have read this agreement and have asked questions about anything I did not understand.    ________________________________________________________  Patient Signature - Allyssa Pedersen     ___________________                   Date     ________________________________________________________  Provider Signature - Melody De Guzman MD       ___________________                   Date      ________________________________________________________  Witness Signature (required if provider not present while patient signing)          ___________________                   Date

## 2024-01-17 ENCOUNTER — OFFICE VISIT (OUTPATIENT)
Dept: FAMILY MEDICINE | Facility: CLINIC | Age: 54
End: 2024-01-17
Payer: COMMERCIAL

## 2024-01-17 VITALS
BODY MASS INDEX: 33.6 KG/M2 | RESPIRATION RATE: 16 BRPM | SYSTOLIC BLOOD PRESSURE: 125 MMHG | HEIGHT: 64 IN | WEIGHT: 196.8 LBS | DIASTOLIC BLOOD PRESSURE: 78 MMHG | OXYGEN SATURATION: 99 % | TEMPERATURE: 98.6 F | HEART RATE: 83 BPM

## 2024-01-17 DIAGNOSIS — K76.0 NAFLD (NONALCOHOLIC FATTY LIVER DISEASE): Primary | ICD-10-CM

## 2024-01-17 DIAGNOSIS — N92.6 IRREGULAR MENSES: ICD-10-CM

## 2024-01-17 DIAGNOSIS — Z97.5 IUD (INTRAUTERINE DEVICE) IN PLACE: ICD-10-CM

## 2024-01-17 DIAGNOSIS — R10.2 PELVIC PAIN IN FEMALE: ICD-10-CM

## 2024-01-17 LAB
ERYTHROCYTE [DISTWIDTH] IN BLOOD BY AUTOMATED COUNT: 15.1 % (ref 10–15)
HCT VFR BLD AUTO: 45.9 % (ref 35–47)
HGB BLD-MCNC: 15.2 G/DL (ref 11.7–15.7)
MCH RBC QN AUTO: 27.8 PG (ref 26.5–33)
MCHC RBC AUTO-ENTMCNC: 33.1 G/DL (ref 31.5–36.5)
MCV RBC AUTO: 84 FL (ref 78–100)
PLATELET # BLD AUTO: 264 10E3/UL (ref 150–450)
RBC # BLD AUTO: 5.46 10E6/UL (ref 3.8–5.2)
WBC # BLD AUTO: 10.2 10E3/UL (ref 4–11)

## 2024-01-17 PROCEDURE — 36415 COLL VENOUS BLD VENIPUNCTURE: CPT | Performed by: FAMILY MEDICINE

## 2024-01-17 PROCEDURE — 85027 COMPLETE CBC AUTOMATED: CPT | Performed by: FAMILY MEDICINE

## 2024-01-17 PROCEDURE — 81001 URINALYSIS AUTO W/SCOPE: CPT | Performed by: FAMILY MEDICINE

## 2024-01-17 PROCEDURE — 99214 OFFICE O/P EST MOD 30 MIN: CPT | Performed by: FAMILY MEDICINE

## 2024-01-17 NOTE — ASSESSMENT & PLAN NOTE
Chart reviewed: Irregular menses-see IUD in place.  Her IUD is noted to be a bit low on ultrasound 11/2022.  Leiomyoma of the uterus is also noted on that ultrasound.

## 2024-01-17 NOTE — ASSESSMENT & PLAN NOTE
Pelvic pain    Today the patient complains of 3 weeks of cramping suprapubic pain which is worsening gradually over time.  The pain is intermittent and she notes that she feels fatigued and has been napping more than usual.  She specifically notes pain with walking up stairs and with bending over.  The pain is 6-7/10 and ibuprofen is noted to help.    Chart reviewed - see irregular menses in the problem list-leiomyoma of the uterus is noted as well as IUD being a bit low in the uterus on ultrasound 11/2022.    Differential diagnosis would include ovarian problem, fibroid which we know she has but could be becoming increasingly problematic, IUD out of place, or appendicitis.    Will obtain CT scan with follow-up gynecology consult.    We did discuss that if pain is worsening, she develops a fever, or any new symptoms she should contact us immediately.  She understands.    Follow up as needed if not getting what she needs with the gynecology consult.

## 2024-01-17 NOTE — PROGRESS NOTES
"  Assessment & Plan   Problem List Items Addressed This Visit          Nervous and Auditory    Pelvic pain in female     Pelvic pain    Today the patient complains of 3 weeks of cramping suprapubic pain which is worsening gradually over time.  The pain is intermittent and she notes that she feels fatigued and has been napping more than usual.  She specifically notes pain with walking up stairs and with bending over.  The pain is 6-7/10 and ibuprofen is noted to help.    Chart reviewed - see irregular menses in the problem list-leiomyoma of the uterus is noted as well as IUD being a bit low in the uterus on ultrasound 11/2022.    Differential diagnosis would include ovarian problem, fibroid which we know she has but could be becoming increasingly problematic, IUD out of place, or appendicitis.    Will obtain CT scan with follow-up gynecology consult.    We did discuss that if pain is worsening, she develops a fever, or any new symptoms she should contact us immediately.  She understands.    Follow up as needed if not getting what she needs with the gynecology consult.         Relevant Orders    CT Abdomen Pelvis w Contrast    Ob/Gyn  Referral    CBC with platelets    UA Macroscopic with reflex to Microscopic and Culture - Lab Collect       Digestive    NAFLD (nonalcoholic fatty liver disease) - Primary     Chart review: NAFLD noted incidentally on ultrasound 11/14/2022            Urinary    IUD (intrauterine device) in place     IUD-position checked with ultrasound in November 2022 and it did look like it was a bit low.    \"UTERUS: 12.5 x 7.2 x 6.1 cm. There is an IUD. Its upper portion appears in the mid uterus, lower than expected. There is a submucosal anterior right 2.3 x 2.2 x 2 cm presumed leiomyoma, similar to previous. There is an intramural mid right 1.3 x 1.3 x   1.1 cm presumed leiomyoma.\"         Irregular menses     Chart reviewed: Irregular menses-see IUD in place.  Her IUD is noted to be a bit " "low on ultrasound 11/2022.  Leiomyoma of the uterus is also noted on that ultrasound.                    BMI  Estimated body mass index is 33.78 kg/m  as calculated from the following:    Height as of this encounter: 1.626 m (5' 4\").    Weight as of this encounter: 89.3 kg (196 lb 12.8 oz).             Alessandra Spivey is a 53 year old, presenting for the following health issues:  Abdominal Pain (Lower abdominal pain 3 wks, some symptoms of constipation and diarrhea throughout the 3 wk. )        1/17/2024     3:41 PM   Additional Questions   Roomed by Percy Ro MA   Accompanied by Self         1/17/2024     3:41 PM   Patient Reported Additional Medications   Patient reports taking the following new medications None     History of Present Illness       Reason for visit:  ADHD check in    She eats 2-3 servings of fruits and vegetables daily.She consumes 0 sweetened beverage(s) daily.She exercises with enough effort to increase her heart rate 10 to 19 minutes per day.  She exercises with enough effort to increase her heart rate 3 or less days per week. She is missing 7 dose(s) of medications per week.  She is not taking prescribed medications regularly due to other.             Objective    /78 (BP Location: Left arm, Patient Position: Sitting, Cuff Size: Adult Large)   Pulse 83   Temp 98.6  F (37  C) (Oral)   Resp 16   Ht 1.626 m (5' 4\")   Wt 89.3 kg (196 lb 12.8 oz)   LMP 12/22/2023   SpO2 99%   BMI 33.78 kg/m    Body mass index is 33.78 kg/m .    Physical Exam  Constitutional:       Appearance: Normal appearance.   HENT:      Head: Normocephalic and atraumatic.   Cardiovascular:      Rate and Rhythm: Normal rate and regular rhythm.   Pulmonary:      Effort: Pulmonary effort is normal.   Abdominal:      General: Bowel sounds are normal. There is no distension.      Palpations: Abdomen is soft. There is no mass.      Tenderness: There is abdominal tenderness (right to mid suprapubic pain noted with " palpation). There is no right CVA tenderness, left CVA tenderness, guarding or rebound.      Hernia: No hernia is present.   Musculoskeletal:         General: Normal range of motion.      Cervical back: Normal range of motion and neck supple.   Neurological:      General: No focal deficit present.      Mental Status: She is alert and oriented to person, place, and time.              Signed Electronically by: Melody De Guzman MD

## 2024-01-17 NOTE — ASSESSMENT & PLAN NOTE
"IUD-position checked with ultrasound in November 2022 and it did look like it was a bit low.    \"UTERUS: 12.5 x 7.2 x 6.1 cm. There is an IUD. Its upper portion appears in the mid uterus, lower than expected. There is a submucosal anterior right 2.3 x 2.2 x 2 cm presumed leiomyoma, similar to previous. There is an intramural mid right 1.3 x 1.3 x   1.1 cm presumed leiomyoma.\"  "

## 2024-01-18 LAB
ALBUMIN UR-MCNC: NEGATIVE MG/DL
APPEARANCE UR: CLEAR
BACTERIA #/AREA URNS HPF: ABNORMAL /HPF
BILIRUB UR QL STRIP: NEGATIVE
COLOR UR AUTO: YELLOW
GLUCOSE UR STRIP-MCNC: NEGATIVE MG/DL
HGB UR QL STRIP: ABNORMAL
KETONES UR STRIP-MCNC: NEGATIVE MG/DL
LEUKOCYTE ESTERASE UR QL STRIP: NEGATIVE
NITRATE UR QL: NEGATIVE
PH UR STRIP: 6 [PH] (ref 5–8)
RBC #/AREA URNS AUTO: ABNORMAL /HPF
SP GR UR STRIP: 1.01 (ref 1–1.03)
SQUAMOUS #/AREA URNS AUTO: ABNORMAL /LPF
UROBILINOGEN UR STRIP-ACNC: 0.2 E.U./DL
WBC #/AREA URNS AUTO: ABNORMAL /HPF

## 2024-01-19 ENCOUNTER — TELEPHONE (OUTPATIENT)
Dept: FAMILY MEDICINE | Facility: CLINIC | Age: 54
End: 2024-01-19
Payer: COMMERCIAL

## 2024-01-19 DIAGNOSIS — R10.2 PELVIC PAIN IN FEMALE: Primary | ICD-10-CM

## 2024-01-19 NOTE — TELEPHONE ENCOUNTER
Order/Referral Request    Who is requesting: Patient     Orders being requested: increased priority of current CT referral    Reason service is needed/diagnosis: patient states CT is out to Thursday next week and she doesn't think she can wait that long    When are orders needed by: asap    Has this been discussed with Provider: Yes    Does patient have a preference on a Group/Provider/Facility? No     Does patient have an appointment scheduled?: No    Where to send orders: Place orders within Epic    Could we send this information to you in [facility] or would you prefer to receive a phone call?:   Patient would prefer a phone call   Okay to leave a detailed message?: Yes at Cell number on file:    Telephone Information:   Mobile [phone]

## 2024-01-19 NOTE — TELEPHONE ENCOUNTER
Patient notified of update to CT order. She verbalized understanding.   Pj Fragoso RN  [facility]

## 2024-01-22 ENCOUNTER — HOSPITAL ENCOUNTER (OUTPATIENT)
Dept: CT IMAGING | Facility: HOSPITAL | Age: 54
Discharge: HOME | End: 2024-01-22
Attending: FAMILY MEDICINE | Admitting: FAMILY MEDICINE
Payer: COMMERCIAL

## 2024-01-22 DIAGNOSIS — R10.2 PELVIC PAIN IN FEMALE: ICD-10-CM

## 2024-01-22 PROCEDURE — 250N000011 HC RX IP 250 OP 636: Performed by: FAMILY MEDICINE

## 2024-01-22 PROCEDURE — 74177 CT ABD & PELVIS W/CONTRAST: CPT

## 2024-01-22 RX ORDER — IOPAMIDOL 755 MG/ML
90 INJECTION, SOLUTION INTRAVASCULAR ONCE
Status: COMPLETED | OUTPATIENT
Start: 2024-01-22 | End: 2024-01-22

## 2024-01-22 RX ADMIN — IOPAMIDOL 90 ML: 755 INJECTION, SOLUTION INTRAVENOUS at 08:36

## 2024-01-23 ENCOUNTER — TELEPHONE (OUTPATIENT)
Dept: FAMILY MEDICINE | Facility: CLINIC | Age: 54
End: 2024-01-23
Payer: COMMERCIAL

## 2024-01-23 ENCOUNTER — PATIENT OUTREACH (OUTPATIENT)
Dept: ONCOLOGY | Facility: CLINIC | Age: 54
End: 2024-01-23
Payer: COMMERCIAL

## 2024-01-23 DIAGNOSIS — R10.2 PELVIC PAIN IN FEMALE: Primary | ICD-10-CM

## 2024-01-23 DIAGNOSIS — Z97.5 IUD (INTRAUTERINE DEVICE) IN PLACE: ICD-10-CM

## 2024-01-23 DIAGNOSIS — R93.5 ABNORMAL CT SCAN, PELVIS: ICD-10-CM

## 2024-01-23 LAB
ABO/RH(D): NORMAL
ANTIBODY SCREEN: NEGATIVE
SPECIMEN EXPIRATION DATE: NORMAL

## 2024-01-23 NOTE — TELEPHONE ENCOUNTER
Called to let Allyssa know that the gynecologist that she has an appt with on 1/30/2024 Dr. Giraldo suggested it would be more appropriate for her to see gyn oncology so stat referral was placed today to get her in in the next 1-2 days. I did ask her to call me if she is not getting in by early next week so that I can try and expedite her visit if needed.

## 2024-01-23 NOTE — ASSESSMENT & PLAN NOTE
Pelvic pain prompted ct scan and Ct scan is abnormal. Called to let Allyssa know that the gynecologist that she has an appt with on 1/30/2024 Dr. Giraldo suggested it would be more appropriate for her to see gyn oncology so stat referral was placed today to get her in in the next 1-2 days. I did ask her to call me if she is not getting in by early next week so that I can try and expedite her visit if needed.

## 2024-01-23 NOTE — PROGRESS NOTES
"New Patient Hematology / Oncology Nurse Navigator Note     Referral Date: 1/23/24    Referring provider:   Melody De Guzman MD   [address]   [facility] [address]   Phone: [phone]   Fax: [phone]     Emergent referral recieved, requesting appointment within 1-2 days. Per note from RMD, pt currently scheduled with Dr. Giraldo (Gyn) 1/30/24 who suggested it would be more appropriate for her to see GynOnc.     Referring Clinic/Organization: [facility]     Referred to: GynOnc    Requested provider (if applicable): First available - did not specify     Evaluation for :     \"pk is having pelvic pain and ct is concerning for malignancy.\"        Clinical History (per Nurse review of records provided):    CT Abd/Pelvis yesterday showing:  IMPRESSION:  1.  18 cm multiloculated cystic structure centered in the right lower quadrant/right adnexa. 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 However, the involvement of the right ovary makes it   difficult to exclude primary ovarian mucinous neoplasm or synchronous ovarian and appendiceal tumors. Recommend surgical consultation.  2.  Ovoid hypoattenuating implants along the splenic surface, as well as small volume pelvic free fluid, worrisome for peritoneal involvement and/or pseudomyxoma peritonei.  3.  Prominent cardiophrenic lymph node, indeterminate. Recommend attention on follow-up. -- BOOKMARKED  1/17/24 Office Visit with FP-- BOOKMARKED  9/14/21 PAP/HPV -- BOOKMARKED    Clinical Assessment / Barriers to Care (Per Nurse):  Pt lives in [facility]     [facility]: [facility]     Records Needed:   N/A    Additional testing needed prior to consult:   Pending input from team    Referral updates and Plan:   Hold 1/24 Dr. Giang at . IB to Dr. Giang confirming appropriate to add patient. Will follow-up pending input.     OUTGOING CALL to pt:  Introduced my role as nurse navigator with " [facility] Hematology/Oncology dept and that we have recd the referral to GynOnc from Dr Chung.  Pt confirms they are aware of the referral and ready to schedule.    Writer explained that I'm reviewing her scan with the team and working to secure an appointment spot for her, potentially tomorrow 1/24 at  with Dr. Giang, which she is agreeable to.     Will follow-up with Allyssa to confirm details pending input from the team.       Sena Healy, BSN, RN, PHN, OCN  Hematology/Oncology Nurse Navigator  [facility]  [phone]

## 2024-01-24 ENCOUNTER — ONCOLOGY VISIT (OUTPATIENT)
Dept: ONCOLOGY | Facility: HOSPITAL | Age: 54
End: 2024-01-24
Attending: FAMILY MEDICINE
Payer: COMMERCIAL

## 2024-01-24 ENCOUNTER — PATIENT OUTREACH (OUTPATIENT)
Dept: ONCOLOGY | Facility: CLINIC | Age: 54
End: 2024-01-24

## 2024-01-24 ENCOUNTER — LAB (OUTPATIENT)
Dept: INFUSION THERAPY | Facility: HOSPITAL | Age: 54
End: 2024-01-24
Attending: FAMILY MEDICINE
Payer: COMMERCIAL

## 2024-01-24 VITALS
BODY MASS INDEX: 33.29 KG/M2 | WEIGHT: 195 LBS | HEIGHT: 64 IN | SYSTOLIC BLOOD PRESSURE: 133 MMHG | DIASTOLIC BLOOD PRESSURE: 74 MMHG | RESPIRATION RATE: 18 BRPM | OXYGEN SATURATION: 98 % | HEART RATE: 77 BPM

## 2024-01-24 DIAGNOSIS — R10.2 PELVIC PAIN IN FEMALE: ICD-10-CM

## 2024-01-24 DIAGNOSIS — R19.00 PELVIC MASS: Primary | ICD-10-CM

## 2024-01-24 DIAGNOSIS — R93.5 ABNORMAL CT SCAN, PELVIS: ICD-10-CM

## 2024-01-24 DIAGNOSIS — K38.8 MUCOCELE OF APPENDIX: ICD-10-CM

## 2024-01-24 DIAGNOSIS — Z97.5 IUD (INTRAUTERINE DEVICE) IN PLACE: ICD-10-CM

## 2024-01-24 LAB
AFP SERPL-MCNC: <1.8 NG/ML
ALBUMIN SERPL BCG-MCNC: 4.2 G/DL (ref 3.5–5.2)
ALP SERPL-CCNC: 78 U/L (ref 40–150)
ALT SERPL W P-5'-P-CCNC: 7 U/L (ref 0–50)
ANION GAP SERPL CALCULATED.3IONS-SCNC: 8 MMOL/L (ref 7–15)
AST SERPL W P-5'-P-CCNC: 14 U/L (ref 0–45)
BASOPHILS # BLD AUTO: 0 10E3/UL (ref 0–0.2)
BASOPHILS NFR BLD AUTO: 1 %
BILIRUB SERPL-MCNC: 0.5 MG/DL
BUN SERPL-MCNC: 8.6 MG/DL (ref 6–20)
CALCIUM SERPL-MCNC: 9.3 MG/DL (ref 8.6–10)
CANCER AG125 SERPL-ACNC: 71 U/ML
CEA SERPL-MCNC: 206 NG/ML
CHLORIDE SERPL-SCNC: 104 MMOL/L (ref 98–107)
CREAT SERPL-MCNC: 0.81 MG/DL (ref 0.51–0.95)
DEPRECATED HCO3 PLAS-SCNC: 28 MMOL/L (ref 22–29)
EGFRCR SERPLBLD CKD-EPI 2021: 86 ML/MIN/1.73M2
EOSINOPHIL # BLD AUTO: 0.1 10E3/UL (ref 0–0.7)
EOSINOPHIL NFR BLD AUTO: 1 %
ERYTHROCYTE [DISTWIDTH] IN BLOOD BY AUTOMATED COUNT: 15.1 % (ref 10–15)
GLUCOSE SERPL-MCNC: 91 MG/DL (ref 70–99)
HCT VFR BLD AUTO: 46.4 % (ref 35–47)
HGB BLD-MCNC: 15.3 G/DL (ref 11.7–15.7)
IMM GRANULOCYTES # BLD: 0 10E3/UL
IMM GRANULOCYTES NFR BLD: 0 %
LDH SERPL L TO P-CCNC: 217 U/L (ref 0–250)
LYMPHOCYTES # BLD AUTO: 1.8 10E3/UL (ref 0.8–5.3)
LYMPHOCYTES NFR BLD AUTO: 35 %
MCH RBC QN AUTO: 27.6 PG (ref 26.5–33)
MCHC RBC AUTO-ENTMCNC: 33 G/DL (ref 31.5–36.5)
MCV RBC AUTO: 84 FL (ref 78–100)
MONOCYTES # BLD AUTO: 0.5 10E3/UL (ref 0–1.3)
MONOCYTES NFR BLD AUTO: 9 %
NEUTROPHILS # BLD AUTO: 2.8 10E3/UL (ref 1.6–8.3)
NEUTROPHILS NFR BLD AUTO: 54 %
NRBC # BLD AUTO: 0 10E3/UL
NRBC BLD AUTO-RTO: 0 /100
PLATELET # BLD AUTO: 288 10E3/UL (ref 150–450)
POTASSIUM SERPL-SCNC: 4.1 MMOL/L (ref 3.4–5.3)
PROT SERPL-MCNC: 7.7 G/DL (ref 6.4–8.3)
RBC # BLD AUTO: 5.54 10E6/UL (ref 3.8–5.2)
SODIUM SERPL-SCNC: 140 MMOL/L (ref 135–145)
WBC # BLD AUTO: 5.2 10E3/UL (ref 4–11)

## 2024-01-24 PROCEDURE — 86900 BLOOD TYPING SEROLOGIC ABO: CPT | Performed by: OBSTETRICS & GYNECOLOGY

## 2024-01-24 PROCEDURE — 82378 CARCINOEMBRYONIC ANTIGEN: CPT | Performed by: OBSTETRICS & GYNECOLOGY

## 2024-01-24 PROCEDURE — 85025 COMPLETE CBC W/AUTO DIFF WBC: CPT | Performed by: OBSTETRICS & GYNECOLOGY

## 2024-01-24 PROCEDURE — 86301 IMMUNOASSAY TUMOR CA 19-9: CPT | Performed by: OBSTETRICS & GYNECOLOGY

## 2024-01-24 PROCEDURE — 99205 OFFICE O/P NEW HI 60 MIN: CPT | Performed by: OBSTETRICS & GYNECOLOGY

## 2024-01-24 PROCEDURE — 36415 COLL VENOUS BLD VENIPUNCTURE: CPT | Performed by: OBSTETRICS & GYNECOLOGY

## 2024-01-24 PROCEDURE — 80053 COMPREHEN METABOLIC PANEL: CPT | Performed by: OBSTETRICS & GYNECOLOGY

## 2024-01-24 PROCEDURE — 99213 OFFICE O/P EST LOW 20 MIN: CPT | Performed by: OBSTETRICS & GYNECOLOGY

## 2024-01-24 PROCEDURE — 83615 LACTATE (LD) (LDH) ENZYME: CPT | Performed by: OBSTETRICS & GYNECOLOGY

## 2024-01-24 PROCEDURE — 82105 ALPHA-FETOPROTEIN SERUM: CPT | Performed by: OBSTETRICS & GYNECOLOGY

## 2024-01-24 PROCEDURE — 86304 IMMUNOASSAY TUMOR CA 125: CPT | Performed by: OBSTETRICS & GYNECOLOGY

## 2024-01-24 ASSESSMENT — PAIN SCALES - GENERAL: PAINLEVEL: SEVERE PAIN (6)

## 2024-01-24 NOTE — PROGRESS NOTES
"Oncology Rooming Note    January 24, 2024 10:00 AM   Allyssa Pederesn is a 53 year old female who presents for:    Chief Complaint   Patient presents with    New Patient     New consult pelvic pain     Initial Vitals: /74   Pulse 77   Resp 18   Ht 1.626 m (5' 4\")   Wt 88.5 kg (195 lb)   LMP 12/22/2023   SpO2 98%   BMI 33.47 kg/m   Estimated body mass index is 33.47 kg/m  as calculated from the following:    Height as of this encounter: 1.626 m (5' 4\").    Weight as of this encounter: 88.5 kg (195 lb). Body surface area is 2 meters squared.  Severe Pain (6) Comment: worsens sometimes   Patient's last menstrual period was 12/22/2023.  Allergies reviewed: Yes  Medications reviewed: Yes    Medications: Medication refills not needed today.  Pharmacy name entered into [employer]: [id number] [address]    Frailty Screening:   Is the patient here for a new oncology consult visit in cancer care? 2. No      Clinical concerns:  new consult pelvic pain      Zahra Corona"

## 2024-01-24 NOTE — LETTER
"    2024         RE: Allyssa Pedersen  [address] [facility] [address]        Dear Colleague,    Thank you for referring your patient, Allyssa Pedersen, to the [facility]. Please see a copy of my visit note below.    Oncology Rooming Note    2024 10:00 AM   Allyssa Pedersen is a 53 year old female who presents for:    Chief Complaint   Patient presents with     New Patient     New consult pelvic pain     Initial Vitals: /74   Pulse 77   Resp 18   Ht 1.626 m (5' 4\")   Wt 88.5 kg (195 lb)   LMP 2023   SpO2 98%   BMI 33.47 kg/m   Estimated body mass index is 33.47 kg/m  as calculated from the following:    Height as of this encounter: 1.626 m (5' 4\").    Weight as of this encounter: 88.5 kg (195 lb). Body surface area is 2 meters squared.  Severe Pain (6) Comment: worsens sometimes   Patient's last menstrual period was 2023.  Allergies reviewed: Yes  Medications reviewed: Yes    Medications: Medication refills not needed today.  Pharmacy name entered into "[employer]: [id number] - [place], [place] - [address]    Frailty Screening:   Is the patient here for a new oncology consult visit in cancer care? 2. No      Clinical concerns:  new consult pelvic pain      Zahra Corona                                      Consult Notes on Referred Patient    Date: 2024       Dr. Melody De Guzman MD  [address] [place] [address]       RE: Allyssa Pedersen  : 1970  DANETTE: 2024    Dear Dr. Melody De Guzman:    I had the pleasure of seeing your patient Allyssa Pedersen here at the Gynecologic Cancer Clinic at the [facility] on 2024.  As you know she is a very pleasant 53 year old woman with a recent diagnosis of  pelvic mass.  Given these findings she was subsequently sent to the Gynecologic Cancer Clinic for new patient consultation.     History obtained from patient and review of EMR (virtual visit 24, office visit " ", CT imaging , mammogram screening 23, pelvic US 22 and , 20; labs 23, paps from , EMB ). Briefly, she is a 52yo who reported about one month history of suprapubic pain and cramping, increased fatigue.  Saw FP on  who arranged CT scan.      24:  CT A/P images reviewed  IMPRESSION:  1.  18 cm multiloculated cystic structure centered in the right lower quadrant/right adnexa. 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 However, the involvement of the right ovary makes it   difficult to exclude primary ovarian mucinous neoplasm or synchronous ovarian and appendiceal tumors. Recommend surgical consultation.  2.  Ovoid hypoattenuating implants along the splenic surface, as well as small volume pelvic free fluid, worrisome for peritoneal involvement and/or pseudomyxoma peritonei.  3.  Prominent cardiophrenic lymph node, indeterminate. Recommend attention on follow-up.     She was referred for further evaluation.  Had a pelvic US 22 (images reviewed) to follow-up on irregular menses.  Uterus large (12.5 x 7.2 x 6.1) with ES 10mm, right ovary with 3.7cm simple cyst, IUD low position. This right ovarian cyst was not seen on US in  (obscured by gas) but seen on US  at 3.9cm, stable.       Hmx:  Mammogram  BIRADS 1.  Pap NILM HR HPV negative . Sleep test 23 with no CLAUDETTE.     52yo  (), reports irregular menses and IUD was placed due to menorrhagia.  The IUD helped. Bleeding erratic with spotting, no \"full period\" since the summer. No hot flashes.  In ROS, reports chronic upper abdominal and pelvic discomfort over the past couple of years.  Has been worse over the past month.  Attributed it to food choices.  Had a lot of pain last week and this precipitated the visit to her PCP and the CT scan.  No fevers, some intermittent nausea, appetite has been lower, some more fatigue, no " weight changes, no CP, no SOB. Some constipation.  Some changes with her bladder in that it is slower to empty.  No swelling or pain in legs.  Does feel some right hip joint pain that is new over the last several months.  Does not exercise.           Past Medical History:  PMH notable for ADHD, sinusitis. History of vaginal warts.     Past Medical History:   Diagnosis Date     Acute non-recurrent maxillary sinusitis 10/09/2017     Attention deficit disorder     Created by Conversion  Replacement Utility updated for latest IMO load     Enlarged uterus 09/14/2021     Excessive, frequent and irregular menstruation 09/04/2020     Obesity (BMI 30-39.9)     Created by Conversion      Plantar fasciitis, left 09/22/2016     RUQ abdominal pain 11/09/2022     Vaginal discharge 11/09/2022         Past Surgical History: No abdominal surgery, no gyn surgery.     Past Surgical History:   Procedure Laterality Date     HC REMOVAL OF TONSILS,<11 Y/O      Description: Tonsillectomy;  Recorded: 12/28/2007;     NE HOME SLEEP TEST/TYPE 3 JULIA  5/10/2023          WISDOM TOOTH EXTRACTION           Health Maintenance:  Health Maintenance Due   Topic Date Due     ADVANCE CARE PLANNING  10/01/2023     YEARLY PREVENTIVE VISIT  11/09/2023       Current Medications:     has a current medication list which includes the following prescription(s): clonidine and lisdexamfetamine.       Allergies:     Patient has no known allergies.        Social History: Lives at home with  and two kids.  Her kids are 24, 22, 19, 17.  Not working.  No tobacco use.  Occassional etoh use.       Social History     Tobacco Use     Smoking status: Never     Smokeless tobacco: Never   Substance Use Topics     Alcohol use: Yes     Alcohol/week: 1.0 standard drink of alcohol     Types: 1 Standard drinks or equivalent per week     Comment: Alcoholic Drinks/day: 1/week       History   Drug Use Unknown           Family History:     The patient's family history is  "notable for: Paternal great uncles/aunts with cancer, unknown type.  Paternal GM with bone cancer.  No genetic testing.    Family History   Problem Relation Age of Onset     Thyroid Disease Mother      Diabetes Mother      Other - See Comments Mother         Heart issue, unsure of diagnosis     Hypertension Mother      Obesity Mother      Seizure Disorder Mother      Multiple fractures Father         fell and paralyzed     Hyperlipidemia Father      Substance Abuse Father         Alcohol     Attention Deficit Disorder Brother      Hypertension Brother      Substance Abuse Brother         Alcohol     Autoimmune Disease Brother      Genetic Disorder Brother         hepatitis/cirrhosis of the liver     Alzheimer Disease Maternal Grandmother      Diabetes Maternal Grandmother      Cancer Paternal Grandmother         Bone     Anxiety Disorder Paternal Grandmother      No Known Problems Paternal Grandfather      Depression Daughter      Anxiety Disorder Daughter      Attention Deficit Disorder Daughter      Depression Daughter      Anxiety Disorder Daughter      Attention Deficit Disorder Daughter      Attention Deficit Disorder Son      Genetic Disorder Son         Erick's Sylvester Syndrome     Other - See Comments Son         Erick's Sylvester Syndrome - genetic disorder     Diabetes Cousin         Most of my maternal cousins have type1     Multiple endocrine neoplasia No family hx of      Thyroid Cancer No family hx of          Physical Exam:     /74   Pulse 77   Resp 18   Ht 1.626 m (5' 4\")   Wt 88.5 kg (195 lb)   LMP 12/22/2023   SpO2 98%   BMI 33.47 kg/m    Body mass index is 33.47 kg/m .    General Appearance: healthy and alert, no distress     Musculoskeletal: extremities non tender and without edema    Skin: no lesions or rashes     Neurological: normal gait, no gross defects     Psychiatric: appropriate mood and affect                               Hematological: normal cervical, supraclavicular and " inguinal lymph nodes     Gastrointestinal:       abdomen soft, palpable mass righ    Genitourinary: External genitalia and urethral meatus appears normal.  Vagina is smooth without nodularity or masses.  Cervix appears normal and without lesions.  Bimanual exam with mobile uterus, anteverted, palpates separate from pelvic mass which feels higher in pelvis.      Assessment:    Allyssa Pedersen is a 53 year old woman with a new diagnosis of pelvic mass, appendiceal versus ovarian in origin.       60 minutes spent on the date of the encounter doing chart review, history and exam, documentation and further activities as noted above    Plan:     1.)    Pelvic mass, appendiceal versus ovarian in origin:  Imaging and exam findings reviewed in detail with patient.  Concerning for possible appendiceal versus mucinous ovarian malignancy with intraperitoneal dissemination.    Will plan for tumor markers, CT chest and opinion with Surg Onc as I am suspicious of appendix primary.  Regardless, anticipate need for surgical intervention for diagnostic and therapeutic purposes.  Would recommend exploratory laparotomy, cytoreduction, hysterectomy, bilateral salpingo-oophorectomy.  Appendectomy.  She was wondering if both ovaries need to be removed.  In all likelihood, both ovaries will need to be removed given appearance on imaging.    Discussed in detail.  Tumor markers today.    Questions answered.    Toyin Giang MD  Gynecologic Oncology  [facility]       CC  Patient Care Team:  Melody De Guzman MD as PCP - General (Family Medicine)  Melody De Guzman MD as Physician (Family Medicine)  Melody De Guzman MD as Assigned PCP  Justina Mejia MD as MD (OB/Gyn)  Yasmin Tapia DO as Assigned Behavioral Health Provider  Jen Giraldo MD as MD (OB/Gyn)  Toyin Giang MD as MD (Gynecologic Oncology)  MELODY DE GUZMAN        Again, thank you for allowing me to participate in the care of your patient.         Sincerely,        Toyin Giang MD

## 2024-01-24 NOTE — PROGRESS NOTES
Consult Notes on Referred Patient    Date: 2024       Dr. Melody De Guzman, MD  [address] [place] [address]       RE: Allyssa Pedersen  : 1970  DANETTE: 2024    Dear Dr. Melody De Guzman:    I had the pleasure of seeing your patient Allyssa Pedersen here at the Gynecologic Cancer Clinic at the [facility] on 2024.  As you know she is a very pleasant 53 year old woman with a recent diagnosis of  pelvic mass.  Given these findings she was subsequently sent to the Gynecologic Cancer Clinic for new patient consultation.     History obtained from patient and review of EMR (virtual visit 24, office visit , CT imaging , mammogram screening 23, pelvic US 22 and , 20; labs 23, paps from , EMB ). Briefly, she is a 52yo who reported about one month history of suprapubic pain and cramping, increased fatigue.  Saw FP on  who arranged CT scan.      24:  CT A/P images reviewed  IMPRESSION:  1.  18 cm multiloculated cystic structure centered in the right lower quadrant/right adnexa. 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 However, the involvement of the right ovary makes it   difficult to exclude primary ovarian mucinous neoplasm or synchronous ovarian and appendiceal tumors. Recommend surgical consultation.  2.  Ovoid hypoattenuating implants along the splenic surface, as well as small volume pelvic free fluid, worrisome for peritoneal involvement and/or pseudomyxoma peritonei.  3.  Prominent cardiophrenic lymph node, indeterminate. Recommend attention on follow-up.     She was referred for further evaluation.  Had a pelvic US 22 (images reviewed) to follow-up on irregular menses.  Uterus large (12.5 x 7.2 x 6.1) with ES 10mm, right ovary with 3.7cm simple cyst, IUD low position. This right ovarian cyst was not seen on US in  (obscured by  "gas) but seen on US  at 3.9cm, stable.       Hmx:  Mammogram  BIRADS 1.  Pap NILM HR HPV negative . Sleep test 23 with no CLAUDETTE.     54yo  (), reports irregular menses and IUD was placed due to menorrhagia.  The IUD helped. Bleeding erratic with spotting, no \"full period\" since the summer. No hot flashes.  In ROS, reports chronic upper abdominal and pelvic discomfort over the past couple of years.  Has been worse over the past month.  Attributed it to food choices.  Had a lot of pain last week and this precipitated the visit to her PCP and the CT scan.  No fevers, some intermittent nausea, appetite has been lower, some more fatigue, no weight changes, no CP, no SOB. Some constipation.  Some changes with her bladder in that it is slower to empty.  No swelling or pain in legs.  Does feel some right hip joint pain that is new over the last several months.  Does not exercise.           Past Medical History:  PMH notable for ADHD, sinusitis. History of vaginal warts.     Past Medical History:   Diagnosis Date    Acute non-recurrent maxillary sinusitis 10/09/2017    Attention deficit disorder     Created by Conversion  Replacement Utility updated for latest IMO load    Enlarged uterus 2021    Excessive, frequent and irregular menstruation 2020    Obesity (BMI 30-39.9)     Created by Conversion     Plantar fasciitis, left 2016    RUQ abdominal pain 2022    Vaginal discharge 2022         Past Surgical History: No abdominal surgery, no gyn surgery.     Past Surgical History:   Procedure Laterality Date    HC REMOVAL OF TONSILS,<11 Y/O      Description: Tonsillectomy;  Recorded: 2007;    OR HOME SLEEP TEST/TYPE 3 JULIA  5/10/2023         WISDOM TOOTH EXTRACTION           Health Maintenance:  Health Maintenance Due   Topic Date Due    ADVANCE CARE PLANNING  10/01/2023    YEARLY PREVENTIVE VISIT  2023       Current Medications:     has a current medication list " which includes the following prescription(s): clonidine and lisdexamfetamine.       Allergies:     Patient has no known allergies.        Social History: Lives at home with  and two kids.  Her kids are 24, 22, 19, 17.  Not working.  No tobacco use.  Occassional etoh use.       Social History     Tobacco Use    Smoking status: Never    Smokeless tobacco: Never   Substance Use Topics    Alcohol use: Yes     Alcohol/week: 1.0 standard drink of alcohol     Types: 1 Standard drinks or equivalent per week     Comment: Alcoholic Drinks/day: 1/week       History   Drug Use Unknown           Family History:     The patient's family history is notable for: Paternal great uncles/aunts with cancer, unknown type.  Paternal GM with bone cancer.  No genetic testing.    Family History   Problem Relation Age of Onset    Thyroid Disease Mother     Diabetes Mother     Other - See Comments Mother         Heart issue, unsure of diagnosis    Hypertension Mother     Obesity Mother     Seizure Disorder Mother     Multiple fractures Father         fell and paralyzed    Hyperlipidemia Father     Substance Abuse Father         Alcohol    Attention Deficit Disorder Brother     Hypertension Brother     Substance Abuse Brother         Alcohol    Autoimmune Disease Brother     Genetic Disorder Brother         hepatitis/cirrhosis of the liver    Alzheimer Disease Maternal Grandmother     Diabetes Maternal Grandmother     Cancer Paternal Grandmother         Bone    Anxiety Disorder Paternal Grandmother     No Known Problems Paternal Grandfather     Depression Daughter     Anxiety Disorder Daughter     Attention Deficit Disorder Daughter     Depression Daughter     Anxiety Disorder Daughter     Attention Deficit Disorder Daughter     Attention Deficit Disorder Son     Genetic Disorder Son         Erick's Sylvester Syndrome    Other - See Comments Son         Erick's Sylvester Syndrome - genetic disorder    Diabetes Cousin         Most of my maternal  "cousins have type1    Multiple endocrine neoplasia No family hx of     Thyroid Cancer No family hx of          Physical Exam:     /74   Pulse 77   Resp 18   Ht 1.626 m (5' 4\")   Wt 88.5 kg (195 lb)   LMP 12/22/2023   SpO2 98%   BMI 33.47 kg/m    Body mass index is 33.47 kg/m .    General Appearance: healthy and alert, no distress     Musculoskeletal: extremities non tender and without edema    Skin: no lesions or rashes     Neurological: normal gait, no gross defects     Psychiatric: appropriate mood and affect                               Hematological: normal cervical, supraclavicular and inguinal lymph nodes     Gastrointestinal:       abdomen soft, palpable mass righ    Genitourinary: External genitalia and urethral meatus appears normal.  Vagina is smooth without nodularity or masses.  Cervix appears normal and without lesions.  Bimanual exam with mobile uterus, anteverted, palpates separate from pelvic mass which feels higher in pelvis.      Assessment:    Allyssa Pedersen is a 53 year old woman with a new diagnosis of pelvic mass, appendiceal versus ovarian in origin.       60 minutes spent on the date of the encounter doing chart review, history and exam, documentation and further activities as noted above    Plan:     1.)    Pelvic mass, appendiceal versus ovarian in origin:  Imaging and exam findings reviewed in detail with patient.  Concerning for possible appendiceal versus mucinous ovarian malignancy with intraperitoneal dissemination.    Will plan for tumor markers, CT chest and opinion with Surg Onc as I am suspicious of appendix primary.  Regardless, anticipate need for surgical intervention for diagnostic and therapeutic purposes.  Would recommend exploratory laparotomy, cytoreduction, hysterectomy, bilateral salpingo-oophorectomy.  Appendectomy.  She was wondering if both ovaries need to be removed.  In all likelihood, both ovaries will need to be removed given appearance on " imaging.    Discussed in detail.  Tumor markers today.    Questions answered.    Toyin Giang MD  Gynecologic Oncology  [facility]       CC  Patient Care Team:  Melody De Guzman MD as PCP - General (Family Medicine)  Melody De Guzman MD as Physician (Family Medicine)  Melody De Guzman MD as Assigned PCP  Justina Mejia MD as MD (OB/Gyn)  Yasmin Tapia DO as Assigned Behavioral Health Provider  Jen Giraldo MD as MD (OB/Gyn)  Toyin Giang MD as MD (Gynecologic Oncology)  MELODY DE GUZMAN

## 2024-01-24 NOTE — PROGRESS NOTES
New Patient Oncology Nurse Navigator Note     Referring provider: Dr. Giang     Referring Clinic/Organization: [facility]     Referred to: Surgical Oncology      Requested provider (if applicable): First available provider    Referral Received: 01/24/24       Evaluation for :  Pelvis mass - appendix vs ovarian      Clinical History (per Nurse review of records provided):      See book marked documents:     Referring MD office note  Pathology report  Imaging reports   Procedure report       No Known Allergies     Past Medical History:   Diagnosis Date    Acute non-recurrent maxillary sinusitis 10/09/2017    Attention deficit disorder     Created by Conversion  Replacement Utility updated for latest IMO load    Enlarged uterus 09/14/2021    Excessive, frequent and irregular menstruation 09/04/2020    Obesity (BMI 30-39.9)     Created by Conversion     Plantar fasciitis, left 09/22/2016    RUQ abdominal pain 11/09/2022    Vaginal discharge 11/09/2022       Past Surgical History:   Procedure Laterality Date    HC REMOVAL OF TONSILS,<11 Y/O      Description: Tonsillectomy;  Recorded: 12/28/2007;    AL HOME SLEEP TEST/TYPE 3 JULIA  5/10/2023         WISDOM TOOTH EXTRACTION         Current Outpatient Medications   Medication Sig Dispense Refill    cloNIDine (CATAPRES) 0.1 MG tablet Take 0.5-1 tablets (0.05-0.1 mg) by mouth nightly as needed (sleep) 30 tablet 1    lisdexamfetamine (VYVANSE) 30 MG capsule Take 1 capsule (30 mg) by mouth every morning 30 capsule 0        Patient Active Problem List   Diagnosis    Class 1 obesity due to excess calories without serious comorbidity with body mass index (BMI) of 32.0 to 32.9 in adult    ADHD, predominantly inattentive type    IUD (intrauterine device) in place    Encounter for preventive care    Has daytime drowsiness    Palpitations    Abnormal CT scan, pelvis    Irregular menses    NAFLD (nonalcoholic fatty liver disease)    Mixed hyperlipidemia         Clinical  Assessment / Barriers to Care (Per Nurse):    None at this time.     Records Location:     [facility]     Records Needed:     NONE AT THIS TIME      Additional testing needed prior to consult:     NONE AT THIS TIME    Referral updates and Plan:       Consult with Surgical Oncology     01/24/2024 3:27 PM - called and spoke with patient, discussed referral for patient to see surgical oncology, informed her we can do a virtual visit tomorrow with Dr. Dennis. She was agreeable to plan and transferred to scheduling to finalize appointment.       Tia Rodríguez, RN, BSN   Surgical Oncology New Patient Nurse Navigator  [facility]  [phone]

## 2024-01-24 NOTE — PATIENT INSTRUCTIONS
Labs today    CT Chest    Refer to Surg Onc for opinion regarding appendix    Anticipate surgery pending opinion from Surgical Oncology.  This may include laparotomy, removal of pelvic mass, total abdominal hysterectomy, removal of both fallopian tubes, right ovary, possible removal of left ovary, appendectomy, tumor debulking    Toyin Giang MD  Gynecologic Oncology  [facility]

## 2024-01-25 ENCOUNTER — PATIENT OUTREACH (OUTPATIENT)
Dept: ONCOLOGY | Facility: CLINIC | Age: 54
End: 2024-01-25
Payer: COMMERCIAL

## 2024-01-25 ENCOUNTER — VIRTUAL VISIT (OUTPATIENT)
Dept: ONCOLOGY | Facility: CLINIC | Age: 54
End: 2024-01-25
Attending: OBSTETRICS & GYNECOLOGY
Payer: COMMERCIAL

## 2024-01-25 ENCOUNTER — PRE VISIT (OUTPATIENT)
Dept: ONCOLOGY | Facility: CLINIC | Age: 54
End: 2024-01-25
Payer: COMMERCIAL

## 2024-01-25 VITALS
DIASTOLIC BLOOD PRESSURE: 74 MMHG | HEIGHT: 64 IN | WEIGHT: 195 LBS | SYSTOLIC BLOOD PRESSURE: 133 MMHG | BODY MASS INDEX: 33.29 KG/M2

## 2024-01-25 DIAGNOSIS — K38.8 MUCOCELE OF APPENDIX: ICD-10-CM

## 2024-01-25 DIAGNOSIS — R19.00 PELVIC MASS: ICD-10-CM

## 2024-01-25 LAB — CANCER AG19-9 SERPL IA-ACNC: 29 U/ML

## 2024-01-25 PROCEDURE — 99204 OFFICE O/P NEW MOD 45 MIN: CPT | Mod: 95 | Performed by: SURGERY

## 2024-01-25 ASSESSMENT — PAIN SCALES - GENERAL: PAINLEVEL: SEVERE PAIN (7)

## 2024-01-25 NOTE — NURSING NOTE
Is the patient currently in the state of [place]? YES    Visit mode:VIDEO    If the visit is dropped, the patient can be reconnected by: VIDEO VISIT: Text to cell phone:   Telephone Information:   Mobile [phone]       Will anyone else be joining the visit? NO  (If patient encounters technical issues they should call [phone]    How would you like to obtain your AVS? MyChart    Are changes needed to the allergy or medication list? Pt stated no changes to allergies and Pt stated no med changes    Reason for visit: Consult (New Oncology )    Tessa DIXON

## 2024-01-25 NOTE — LETTER
1/25/2024         RE: Allyssa Pedersen  [address]  [facility] [address]        Dear Colleague,    Thank you for referring your patient, Allyssa Pedersen, to the [facility]. Please see a copy of my visit note below.    Virtual Visit Details      Today I had a video visit with Allyssa to discuss management of her presumed diagnosis of a low-grade mucinous neoplasm of the appendix.  She was having abdominal pain and pelvic pain and a CT scan was ordered on January 22.  I have reviewed this CT scan which demonstrated multiple mucinous implants around the splenic capsule, I dilated the appendix, and an 18 cm cystic mass in the pelvis and right lower quadrant that was involving the adnexa and was intimate with the uterus.  The patient saw GYN oncology.  Her labs were relevant for a CEA level of 206 and a  of 71.  She has a chest CT scheduled for next week.  She had a Cologuard test in November 2023 that was negative.  She is otherwise healthy with no major medical problems.  She has not had any prior CT scans.    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  I have recommended cytoreductive surgery which would include a hysterectomy salpingo-oophorectomy appendectomy and splenectomy.  I have recommended preoperative vaccines which can be done at her preop visit.  I have also recommended bilateral ureteral stent placement at the time of her surgery.  I discussed the risks and complications of the procedure including bleeding ureteral injury infection DVT and pulmonary embolism and pneumonia.  The patient wishes to proceed.  I will work with GYN oncology for this combined procedure.    The video started at 10:29 AM and ended at 10:47 AM and the total time of the visit was 45 minutes which included reviewing her imaging and coordinating her care    Robin Dennis MD

## 2024-01-25 NOTE — PROGRESS NOTES
[facility]: Surgical Oncology Plan of Care Education Note                                     Discussion with Patient:                                                       Spoke with Allyssa following her visit with Dr. Dennis on 1/25/2024. Introduced self and explained role of RNCC.       Plan:                                                       Reviewed plan for open cytoreductive surgery and splenectomy, bilateral ureteral stent placement by Urology and hysterectomy and BSO with  at the [place]. Provided appropriate OR location map. Discussed need for H&P within 30 days of surgery. Allyssa prefers to schedule with her PCP. Reviewed shower instructions and mailed written hand-out and bottle of surgical scrub.     Splenectomy vaccines with be given by Allyssa's PCP 2 weeks prior to surgery.     Informed patient they should get a call within the next three business days from our OR  with surgery date, H&P plan and date of post-op visit with their surgeon. Writer answered all questions and concerns to the best of her ability and provided her contact information. Reviewed use of [employer]ograft[employer] as alternative way to contact team.  Encouraged patient to contact with questions.         Lee Ann Noriega, RNCC  [facility]  Surgical Onolcogy      Approximately 10 minutes was spent in discussion with patient.

## 2024-01-25 NOTE — PATIENT INSTRUCTIONS
Splenectomy vaccines 2-3 weeks prior to surgery.     Pneumococcal Vaccine  Haemophilus influenza vaccine type B (Hib)  Meningococcal serotype ACWY (Menvo)  Meningococcal serotype B (Bexsero)

## 2024-01-25 NOTE — TELEPHONE ENCOUNTER
RECORDS STATUS - ALL OTHER DIAGNOSIS      RECORDS RECEIVED FROM: [facility] - internal records   DATE RECEIVED: 1/25

## 2024-01-25 NOTE — PROGRESS NOTES
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  She was having abdominal pain and pelvic pain and a CT scan was ordered on January 22.  I have reviewed this CT scan which demonstrated multiple mucinous implants around the splenic capsule, I dilated the appendix, and an 18 cm cystic mass in the pelvis and right lower quadrant that was involving the adnexa and was intimate with the uterus.  The patient saw GYN oncology.  Her labs were relevant for a CEA level of 206 and a  of 71.  She has a chest CT scheduled for next week.  She had a Cologuard test in November 2023 that was negative.  She is otherwise healthy with no major medical problems.  She has not had any prior CT scans.    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  I have recommended cytoreductive surgery which would include a hysterectomy salpingo-oophorectomy appendectomy and splenectomy.  I have recommended preoperative vaccines which can be done at her preop visit.  I have also recommended bilateral ureteral stent placement at the time of her surgery.  I discussed the risks and complications of the procedure including bleeding ureteral injury infection DVT and pulmonary embolism and pneumonia.  The patient wishes to proceed.  I will work with GYN oncology for this combined procedure.    The video started at 10:29 AM and ended at 10:47 AM and the total time of the visit was 45 minutes which included reviewing her imaging and coordinating her care

## 2024-01-26 ENCOUNTER — PREP FOR PROCEDURE (OUTPATIENT)
Dept: ONCOLOGY | Facility: CLINIC | Age: 54
End: 2024-01-26
Payer: COMMERCIAL

## 2024-01-26 ENCOUNTER — PATIENT OUTREACH (OUTPATIENT)
Dept: ONCOLOGY | Facility: CLINIC | Age: 54
End: 2024-01-26
Payer: COMMERCIAL

## 2024-01-26 DIAGNOSIS — R19.00 PELVIC MASS: Primary | ICD-10-CM

## 2024-01-26 RX ORDER — METRONIDAZOLE 500 MG/100ML
500 INJECTION, SOLUTION INTRAVENOUS
Status: CANCELLED | OUTPATIENT
Start: 2024-01-26

## 2024-01-26 RX ORDER — HEPARIN SODIUM 10000 [USP'U]/ML
5000 INJECTION, SOLUTION INTRAVENOUS; SUBCUTANEOUS
Status: CANCELLED | OUTPATIENT
Start: 2024-01-26

## 2024-01-26 RX ORDER — PHENAZOPYRIDINE HYDROCHLORIDE 100 MG/1
200 TABLET, FILM COATED ORAL ONCE
Status: CANCELLED | OUTPATIENT
Start: 2024-01-26 | End: 2024-01-26

## 2024-01-26 NOTE — PROGRESS NOTES
Surgery orders place for OR    Procedure: Exploratory laparotomy, total abdominal hysterectomy, bilateral salpingo-oophorectomy, tumor debulking.  Combined case with Dr. Dennis.    Indication:  Pelvic mass    Needs CT chest (ordered), labs completed. Preop teaching.  Consent day of surgery.    RN ANISHA He    Postop 2 wks in person LOREN Giang MD  Gynecologic Oncology  [facility]

## 2024-01-26 NOTE — PROGRESS NOTES
RN reached out to patient for introduction and contact information     All patients questions answered to the best of her ability     Patient appreciative of the RN time    Jen He RN

## 2024-01-30 ENCOUNTER — TELEPHONE (OUTPATIENT)
Dept: ONCOLOGY | Facility: CLINIC | Age: 54
End: 2024-01-30

## 2024-01-30 ENCOUNTER — PATIENT OUTREACH (OUTPATIENT)
Dept: ONCOLOGY | Facility: CLINIC | Age: 54
End: 2024-01-30

## 2024-01-30 ENCOUNTER — PREP FOR PROCEDURE (OUTPATIENT)
Dept: ONCOLOGY | Facility: CLINIC | Age: 54
End: 2024-01-30

## 2024-01-30 ENCOUNTER — PREP FOR PROCEDURE (OUTPATIENT)
Dept: UROLOGY | Facility: CLINIC | Age: 54
End: 2024-01-30

## 2024-01-30 ENCOUNTER — HOSPITAL ENCOUNTER (INPATIENT)
Facility: CLINIC | Age: 54
Setting detail: SURGERY ADMIT
End: 2024-01-30
Attending: OBSTETRICS & GYNECOLOGY | Admitting: OBSTETRICS & GYNECOLOGY
Payer: COMMERCIAL

## 2024-01-30 NOTE — TELEPHONE ENCOUNTER
Patient is schedule for surgery with: Dr. Giang, Dr. Dennis, and Dr. New    Surgery Date: 2/13     Location: [facility]    H&P: to be completed by PAC clinic - scheduled on 2/2     Post-op: will be scheduled by each individual clinic as needed.     Patient will receive surgery arrival and start time from PAC. Patient is aware that if times change after they see PAC, they will receive a call from the pre-admission nurses 1-2 days prior to surgery with updated arrival time and NPO instructions.    Patient aware times are subject to change up until day before surgery.     Patient questions/concerns: N/A     Surgery packet was sent via US mail on 1/30      Traci Barroso on 1/30/2024 at 11:14 AM

## 2024-01-30 NOTE — TELEPHONE ENCOUNTER
FUTURE VISIT INFORMATION      SURGERY INFORMATION:  Date: 2/13/24  Location: [facility] or  Surgeon:  Toyin Giang MD Tuttle, Todd M, MD   Anesthesia Type:  general  Procedure: Exploratory laparotomy, total abdominal hysterectomy, bilateral salpingo-oophorectomy, tumor debulking open cytoreductive chemotherapy open splenectomy   Consult: ov 1/24/24    RECORDS REQUESTED FROM:       Primary Care Provider: Melody De Guzman MD - [facility]    Pertinent Medical History: Palpitations

## 2024-01-30 NOTE — PROGRESS NOTES
[facility]: Surgical Oncology Cancer Care Short Note                                     Discussion with Patient:                                                       INBOUND CALL:     Received call from Allyssa. She had a question regarding her PAC visit. She was initially under the impression she could have her H&P with her PCP. Explained that Dr. Giang is requesting a PAC visit prior to surgery. Allyssa verbalized understanding.     Discussed that her splenectomy vaccines could be done either with her PCP or at a Nurse Visit on the day of her PAC appointment. Allyssa confirmed she would prefer to have them done at the [facility].     Splenectomy orders placed. Message sent to Scheduling team to schedule Nurse visit on 2/2.    Encouraged Allyssa to call with any further questions or concerns.     Lee Ann Noriega RNCC  [facility]   Surgical Oncology     Approximately 5 minutes was spent in conversation with the patient.

## 2024-01-31 ENCOUNTER — HOSPITAL ENCOUNTER (OUTPATIENT)
Dept: CT IMAGING | Facility: HOSPITAL | Age: 54
Discharge: HOME | End: 2024-01-31
Attending: OBSTETRICS & GYNECOLOGY | Admitting: OBSTETRICS & GYNECOLOGY
Payer: COMMERCIAL

## 2024-01-31 DIAGNOSIS — R19.00 PELVIC MASS: ICD-10-CM

## 2024-01-31 DIAGNOSIS — K38.8 MUCOCELE OF APPENDIX: ICD-10-CM

## 2024-01-31 PROCEDURE — 71260 CT THORAX DX C+: CPT

## 2024-01-31 PROCEDURE — 250N000011 HC RX IP 250 OP 636: Performed by: OBSTETRICS & GYNECOLOGY

## 2024-01-31 RX ORDER — IOPAMIDOL 755 MG/ML
90 INJECTION, SOLUTION INTRAVASCULAR ONCE
Status: COMPLETED | OUTPATIENT
Start: 2024-01-31 | End: 2024-01-31

## 2024-01-31 RX ADMIN — IOPAMIDOL 90 ML: 755 INJECTION, SOLUTION INTRAVENOUS at 14:38

## 2024-02-02 ENCOUNTER — ANESTHESIA EVENT (OUTPATIENT)
Dept: SURGERY | Facility: CLINIC | Age: 54
End: 2024-02-02

## 2024-02-02 ENCOUNTER — ALLIED HEALTH/NURSE VISIT (OUTPATIENT)
Dept: ONCOLOGY | Facility: CLINIC | Age: 54
End: 2024-02-02
Payer: COMMERCIAL

## 2024-02-02 ENCOUNTER — PRE VISIT (OUTPATIENT)
Dept: SURGERY | Facility: CLINIC | Age: 54
End: 2024-02-02

## 2024-02-02 ENCOUNTER — OFFICE VISIT (OUTPATIENT)
Dept: SURGERY | Facility: CLINIC | Age: 54
End: 2024-02-02
Payer: COMMERCIAL

## 2024-02-02 VITALS
OXYGEN SATURATION: 99 % | RESPIRATION RATE: 16 BRPM | TEMPERATURE: 98 F | WEIGHT: 190 LBS | HEART RATE: 93 BPM | DIASTOLIC BLOOD PRESSURE: 85 MMHG | HEIGHT: 64 IN | SYSTOLIC BLOOD PRESSURE: 128 MMHG | BODY MASS INDEX: 32.44 KG/M2

## 2024-02-02 DIAGNOSIS — Z01.818 PRE-OP EVALUATION: Primary | ICD-10-CM

## 2024-02-02 DIAGNOSIS — K38.8 MUCOCELE OF APPENDIX: Primary | ICD-10-CM

## 2024-02-02 PROCEDURE — 90472 IMMUNIZATION ADMIN EACH ADD: CPT | Performed by: SURGERY

## 2024-02-02 PROCEDURE — 99203 OFFICE O/P NEW LOW 30 MIN: CPT | Performed by: PHYSICIAN ASSISTANT

## 2024-02-02 PROCEDURE — 90620 MENB-4C VACCINE IM: CPT | Performed by: SURGERY

## 2024-02-02 PROCEDURE — 90619 MENACWY-TT VACCINE IM: CPT | Performed by: SURGERY

## 2024-02-02 PROCEDURE — 90648 HIB PRP-T VACCINE 4 DOSE IM: CPT | Performed by: SURGERY

## 2024-02-02 PROCEDURE — 250N000011 HC RX IP 250 OP 636: Performed by: SURGERY

## 2024-02-02 PROCEDURE — 90670 PCV13 VACCINE IM: CPT | Performed by: SURGERY

## 2024-02-02 PROCEDURE — G0009 ADMIN PNEUMOCOCCAL VACCINE: HCPCS | Performed by: SURGERY

## 2024-02-02 PROCEDURE — 250N000021 HC RX MED A9270 GY (STAT IND- M) 250: Performed by: SURGERY

## 2024-02-02 RX ORDER — MULTIVITAMIN
1 TABLET ORAL EVERY MORNING
COMMUNITY

## 2024-02-02 RX ORDER — LEVONORGESTREL 52 MG/1
1 INTRAUTERINE DEVICE INTRAUTERINE ONCE
COMMUNITY
End: 2024-03-18

## 2024-02-02 RX ADMIN — NEISSERIA MENINGITIDIS GROUP A CAPSULAR POLYSACCHARIDE TETANUS TOXOID CONJUGATE ANTIGEN, NEISSERIA MENINGITIDIS GROUP C CAPSULAR POLYSACCHARIDE TETANUS TOXOID CONJUGATE ANTIGEN, NEISSERIA MENINGITIDIS GROUP Y CAPSULAR POLYSACCHARIDE TETANUS TOXOID CONJUGATE ANTIGEN, AND NEISSERIA MENINGITIDIS GROUP W-135 CAPSULAR POLYSACCHARIDE TETANUS TOXOID CONJUGATE ANTIGEN 0.5 ML: 10; 10; 10; 10 INJECTION, SOLUTION INTRAMUSCULAR at 14:00

## 2024-02-02 RX ADMIN — NEISSERIA MENINGITIDIS SEROGROUP B NHBA FUSION PROTEIN ANTIGEN, NEISSERIA MENINGITIDIS SEROGROUP B FHBP FUSION PROTEIN ANTIGEN AND NEISSERIA MENINGITIDIS SEROGROUP B NADA PROTEIN ANTIGEN 0.5 ML: 50; 50; 50; 25 INJECTION, SUSPENSION INTRAMUSCULAR at 13:59

## 2024-02-02 RX ADMIN — HAEMOPHILUS B POLYSACCHARIDE CONJUGATE VACCINE FOR INJ 0.5 ML: RECON SOLN at 14:00

## 2024-02-02 RX ADMIN — PNEUMOCOCCAL 13-VALENT CONJUGATE VACCINE 0.5 ML: 2.2; 2.2; 2.2; 2.2; 2.2; 4.4; 2.2; 2.2; 2.2; 2.2; 2.2; 2.2; 2.2 INJECTION, SUSPENSION INTRAMUSCULAR at 13:57

## 2024-02-02 ASSESSMENT — PAIN SCALES - GENERAL: PAINLEVEL: SEVERE PAIN (6)

## 2024-02-02 ASSESSMENT — ENCOUNTER SYMPTOMS: SEIZURES: 0

## 2024-02-02 ASSESSMENT — LIFESTYLE VARIABLES: TOBACCO_USE: 0

## 2024-02-02 NOTE — H&P
Pre-Operative H & P     CC:  Preoperative exam to assess for increased cardiopulmonary risk while undergoing surgery and anesthesia.    Date of Encounter: 2/2/2024  Primary Care Physician:  Melody De Guzman     Reason for visit:   Encounter Diagnosis   Name Primary?    Pre-op evaluation Yes       HPI  Allyssa Pedersen is a 53 year old female who presents for pre-operative H & P in preparation for  Procedure Information       Case: [id number] Date/Time: 02/13/24 0800    Procedures:       Exploratory laparotomy, total abdominal hysterectomy, bilateral salpingo-oophorectomy, tumor debulking (Abdomen)      open cytoreductive chemotherapy (Abdomen)      open splenectomy (Abdomen)      Cystoscopy, Insert Stent Ureter (Bilateral: Urethra)    Anesthesia type: General    Diagnosis: Pelvic mass [R19.00]    Pre-op diagnosis: Pelvic mass [R19.00]    Location: [facility] OR [facility] / [facility] OR    Providers: Toyin Giang MD; Roibn Dennis MD; Evelin New MD            Patient is being evaluated for comorbid conditions of dyslipidemia, NAFLD, obesity, ADD    Ms. Pedersen has a presumed diagnosis of a low grade mucinous neoplasm of the appendix. She was seen by Dr. Dennis and Dr. Giang for further evaluation and is now scheduled for the above procedure.     History is obtained from the patient and chart review    Hx of abnormal bleeding or anti-platelet use: denies    Menstrual history: Patient's last menstrual period was 01/26/2024 (exact date).     Past Medical History  Past Medical History:   Diagnosis Date    Acute non-recurrent maxillary sinusitis 10/09/2017    Attention deficit disorder     Created by Conversion  Replacement Utility updated for latest IMO load    Enlarged uterus 09/14/2021    Excessive, frequent and irregular menstruation 09/04/2020    Obesity (BMI 30-39.9)     Created by Conversion     Plantar fasciitis, left 09/22/2016    RUQ abdominal pain 11/09/2022    Vaginal discharge 11/09/2022       Past Surgical History  Past  Surgical History:   Procedure Laterality Date    HC REMOVAL OF TONSILS,<11 Y/O      Description: Tonsillectomy;  Recorded: 12/28/2007;    MD HOME SLEEP TEST/TYPE 3 JULIA  5/10/2023         WISDOM TOOTH EXTRACTION         Prior to Admission Medications  Current Outpatient Medications   Medication Sig Dispense Refill    cloNIDine (CATAPRES) 0.1 MG tablet Take 0.5-1 tablets (0.05-0.1 mg) by mouth nightly as needed (sleep) 30 tablet 1    levonorgestrel (MIRENA, 52 MG,) 52 MG (20 mcg/day) IUD 1 each by Intrauterine route once      multivitamin w/minerals (MULTI-VITAMIN) tablet Take 1 tablet by mouth every morning      lisdexamfetamine (VYVANSE) 30 MG capsule Take 1 capsule (30 mg) by mouth every morning (Patient not taking: Reported on 2/2/2024) 30 capsule 0       Allergies  No Known Allergies    Social History  Social History     Socioeconomic History    Marital status:      Spouse name: Not on file    Number of children: 4    Years of education: 16    Highest education level: Not on file   Occupational History    Not on file   Tobacco Use    Smoking status: Never    Smokeless tobacco: Never   Vaping Use    Vaping Use: Never used   Substance and Sexual Activity    Alcohol use: Not Currently     Alcohol/week: 1.0 standard drink of alcohol     Types: 1 Standard drinks or equivalent per week     Comment: Alcoholic Drinks/day: 1/week    Drug use: Never    Sexual activity: Yes     Partners: Male     Birth control/protection: Condom, I.U.D.   Other Topics Concern    Parent/sibling w/ CABG, MI or angioplasty before 65F 55M? 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 . For fun she likes to travel, likes to go to [place] [employer] hockey games, likes [place] and [employer].      Social Determinants of Health     Financial Resource Strain: Low Risk  (1/15/2024)    Financial Resource Strain     Within the past 12 months, have you or your family members you live with been unable to get  utilities (heat, electricity) when it was really needed?: No   Food Insecurity: Low Risk  (1/15/2024)    Food Insecurity     Within the past 12 months, did you worry that your food would run out before you got money to buy more?: No     Within the past 12 months, did the food you bought just not last and you didn t have money to get more?: No   Transportation Needs: Low Risk  (1/15/2024)    Transportation Needs     Within the past 12 months, has lack of transportation kept you from medical appointments, getting your medicines, non-medical meetings or appointments, work, or from getting things that you need?: No   Physical Activity: Not on file   Stress: Not on file   Social Connections: Not on file   Interpersonal Safety: Low Risk  (1/17/2024)    Interpersonal Safety     Do you feel physically and emotionally safe where you currently live?: Yes     Within the past 12 months, have you been hit, slapped, kicked or otherwise physically hurt by someone?: No     Within the past 12 months, have you been humiliated or emotionally abused in other ways by your partner or ex-partner?: No   Housing Stability: Low Risk  (1/15/2024)    Housing Stability     Do you have housing? : Yes     Are you worried about losing your housing?: No       Family History  Family History   Problem Relation Age of Onset    Thyroid Disease Mother     Diabetes Mother     Other - See Comments Mother         Heart issue, unsure of diagnosis    Hypertension Mother     Obesity Mother     Seizure Disorder Mother     Multiple fractures Father         fell and paralyzed    Hyperlipidemia Father     Substance Abuse Father         Alcohol    Attention Deficit Disorder Brother     Hypertension Brother     Substance Abuse Brother         Alcohol    Autoimmune Disease Brother     Genetic Disorder Brother         hepatitis/cirrhosis of the liver    Alzheimer Disease Maternal Grandmother     Diabetes Maternal Grandmother     Cancer Paternal Grandmother          Bone    Anxiety Disorder Paternal Grandmother     No Known Problems Paternal Grandfather     Depression Daughter     Anxiety Disorder Daughter     Attention Deficit Disorder Daughter     Depression Daughter     Anxiety Disorder Daughter     Attention Deficit Disorder Daughter     Attention Deficit Disorder Son     Genetic Disorder Son         Kiki Sylvester Syndrome    Other - See Comments Son         Erick's Sylvester Syndrome - genetic disorder    Diabetes Cousin         Most of my maternal cousins have type1    Multiple endocrine neoplasia No family hx of     Thyroid Cancer No family hx of     Anesthesia Reaction No family hx of     Deep Vein Thrombosis (DVT) No family hx of        Review of Systems  The complete review of systems is negative other than noted in the HPI or here.   Anesthesia Evaluation   Pt has had prior anesthetic.     History of anesthetic complications   possibl high heat rate with tooth extraction.    ROS/MED HX  ENT/Pulmonary:    (-) tobacco use   Neurologic:  - neg neurologic ROS  (-) no seizures and no CVA   Cardiovascular:     (+)  - -   -  - -                                 No previous cardiac testing  (-) taking anticoagulants/antiplatelets   METS/Exercise Tolerance: >4 METS Comment: Walking mile or 2 at a time, can ascend stairs    Hematologic:  - neg hematologic  ROS  (-) history of blood clots and history of blood transfusion   Musculoskeletal:  - neg musculoskeletal ROS     GI/Hepatic:     (+)             liver disease (NAFLD),    (-) GERD   Renal/Genitourinary:  - neg Renal ROS     Endo:     (+)               Obesity (BMI 33),    (-) chronic steroid usage   Psychiatric/Substance Use:  - neg psychiatric ROS     Infectious Disease:  - neg infectious disease ROS     Malignancy:   (+) Malignancy, History of GI.    Other:            /85 (BP Location: Right arm, Patient Position: Sitting, Cuff Size: Adult Large)   Pulse 93   Temp 98  F (36.7  C) (Oral)   Resp 16   Ht 1.626 m (5'  "4\")   Wt 86.2 kg (190 lb)   LMP 01/26/2024 (Exact Date)   SpO2 99%   Breastfeeding No   BMI 32.61 kg/m      Physical Exam   Constitutional: Awake, alert, cooperative, no apparent distress, and appears stated age.  Eyes: Pupils equal, round and reactive to light, extra ocular muscles intact, sclera clear, conjunctiva normal.  HENT: Normocephalic, oral pharynx with moist mucus membranes, good dentition. No goiter appreciated.   Respiratory: Clear to auscultation bilaterally, no crackles or wheezing.  Cardiovascular: Regular rate and rhythm, normal S1 and S2, and no murmur noted.  Carotids no bruits. No edema. Palpable pulses to radial arteries.   GI: Normal bowel sounds, soft, non-distended, TTP  Genitourinary:  deferred  Skin: Warm and dry.  No rashes at anticipated surgical site.   Musculoskeletal: Full ROM of neck. There is no redness, warmth, or swelling of the joints. Gross motor strength is normal.    Neurologic: Awake, alert, oriented to name, place and time. Cranial nerves II-XII are grossly intact.   Neuropsychiatric: Calm, cooperative. Normal affect.     Prior Labs/Diagnostic Studies   All labs and imaging personally reviewed     EKG/ stress test - if available please see in ROS above   No results found.       No data to display                  The patient's records and results personally reviewed by this provider.     Outside records reviewed from: Care Everywhere    LAB/DIAGNOSTIC STUDIES TODAY:  none    Assessment    Allyssa Pedersen is a 53 year old female seen as a PAC referral for risk assessment and optimization for anesthesia.    Plan/Recommendations  Pt will be optimized for the proposed procedure.  See below for details on the assessment, risk, and preoperative recommendations    NEUROLOGY  - No history of TIA, CVA or seizure  -Post Op delirium risk factors:  No risk identified    ENT  - No current airway concerns.  Will need to be reassessed day of surgery.  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 Above procedure planned. Patient had thoughtful questions about nerve block options. She discussed nerve blocks with her second opinion at [facility].  She is concerned about post op pain control. Dr. Beckwith has messaged the surgery team to discuss block options. Patient is aware ultimate plan will be up to team DOS.   PONV High Risk  Total Score: 3           1 AN PONV: Pt is Female    1 AN PONV: Patient is not a current smoker    1 AN PONV: Intended Post Op Opioids        /RENAL  - Baseline Creatinine  WNL    ENDOCRINE    - BMI: Estimated body mass index is 32.61 kg/m  as calculated from the following:    Height as of this encounter: 1.626 m (5' 4\").    Weight as of this encounter: 86.2 kg (190 lb).  Obesity (BMI >30)  - No history of Diabetes Mellitus    HEME  VTE Medium Risk 1.8%            Total Score: 5    VTE: Current cancer      - No history of abnormal bleeding or antiplatelet use.    Different anesthesia methods/types have been discussed with the patient, but they are aware that the final plan will be decided by the assigned anesthesia provider on the date of service.    The patient is optimized for their procedure. AVS with information on surgery time/arrival time, meds and NPO status given by nursing staff. No further diagnostic testing indicated.      On the day of service:     Prep time: 12 minutes  Visit time: 13 " minutes  Documentation time: 15 minutes  ------------------------------------------  Total time: 40 minutes      Rachel Sears PA-C  Preoperative Assessment Center  [facility]  Clinic and Surgery Center  Phone: [phone]  Fax: [phone]

## 2024-02-02 NOTE — PATIENT INSTRUCTIONS
Preparing for Your Surgery      Name:  Allyssa Pedersen   MRN:  [id number]   :  1970   Today's Date:  2024       Arriving for surgery:  Surgery date:  24  Arrival time:  6.00AM    Please come to:     Please come to:      [facility] [address]  [address] [place]  [address]      The [facility] Patient /Visitor Ramp is located at [address]. Patients and visitors who self-park will receive the reduced hospital parking rate. If the Patient /Visitor Ramp is full, please follow the signs to the [employer] parking located at the main hospital entrance.     parking is available ( 24 hours/ 7 days a week)    Discounted parking pass options are available for patients and visitors. They can be purchased at the [employer] desk at the main hospital entrance.    -    Stop at the security desk and they will direct surgery patients to the 3rd floor Surgery Waiting Room. [phone]     -  If you are in need of directions, wheelchair or escort please stop at the Information/security desk in the lobby.       What can I eat or drink?  -  You may eat and drink normally up to 8 hours prior to arrival time. (Until 10.00PM)  -  You may have clear liquids until 2 hours prior to arrival time. (Until 4.00AM)    Examples of clear liquids:  Water  Clear broth  Juices (apple, white grape, white cranberry  and cider) without pulp  Noncarbonated, powder based beverages  (lemonade and Tani-Aid)  Sodas (Sprite, 7-Up, ginger ale and seltzer)  Coffee or tea (without milk or cream)  Gatorade    -  No Alcohol or cannabis products for at least 24 hours before surgery.     Which medicines can I take?    Hold Aspirin for 7 days before surgery.   Hold Multivitamins for 7 days before surgery.  Hold Supplements for 7 days before surgery.  Hold Ibuprofen (Advil, Motrin) for 1 day(s) before surgery--unless otherwise directed by surgeon.  Hold Naproxen  (Aleve) for 4 days before surgery.    Continue your evening/bedtime medications per usual.    -  DO NOT take these medications the day of surgery:  None    -  PLEASE TAKE these medications the day of surgery:  None.    How do I prepare myself?  - Please take 2 showers (one the night prior to surgery and one the morning of surgery) using Scrubcare or Hibiclens soap.    Use this soap only from the neck to your toes.     Leave the soap on your skin for one minute--then rinse thoroughly.      You may use your own shampoo and conditioner. No other hair products.   - Please remove all jewelry and body piercings.  - No lotions, deodorants or fragrance.  - No makeup or fingernail polish.   - Bring your ID and insurance card.    -If you use a CPAP machine, please bring the CPAP machine, tubing, and mask to hospital.    -If you have a Deep Brain Stimulator, Spinal Cord Stimulator, or any Neuro Stimulator device---you must bring the remote control to the hospital.      ALL PATIENTS GOING HOME THE SAME DAY OF SURGERY ARE REQUIRED TO HAVE A RESPONSIBLE ADULT TO DRIVE AND BE IN ATTENDANCE WITH THEM FOR 24 HOURS FOLLOWING SURGERY.    Covid testing policy as of 12/06/2022  Your surgeon will notify and schedule you for a COVID test if one is needed before surgery--please direct any questions or COVID symptoms to your surgeon      Questions or Concerns:    - For any questions regarding the day of surgery or your hospital stay, please contact the Pre Admission Nursing Office at [phone].       - If you have health changes between today and your surgery, please call your surgeon.       - For questions after surgery, please call your surgeons office.           Current Visitor Guidelines    You may have 2 visitors in the pre op area.    Visiting hours: 8 a.m. to 8:30 p.m.    Patients confirmed or suspected to have symptoms of COVID 19 or flu:     No visitors allowed for adult patients.   Children (under age 18) can have 1 named  visitor.     People who are sick or showing symptoms of COVID 19 or flu:    Are not allowed to visit patients--we can only make exceptions in special situations.       Please follow these guidelines for your visit:          Please maintain social distance          Masking is optional--however at times you may be asked to wear a mask for the safety of yourself and others     Clean your hands with alcohol hand . Do this when you arrive at and leave the building and patient room,    And again after you touch your mask or anything in the room.     Go directly to and from the room you are visiting.     Stay in the patient s room during your visit. Limit going to other places in the hospital as much as possible     Leave bags and jackets at home or in the car.     For everyone s health, please don t come and go during your visit. That includes for smoking   during your visit.

## 2024-02-02 NOTE — NURSING NOTE
Patient presents to the [facility] for ActHIB, Menveo, Bexsero, Prevnar injection.     Order written by Dr Giang was completed today.    Name and  verified with patient. See MAR for medication details.        Immunizations Administered       Name Date Dose VIS Date Route    HIB (PRP-T) 24  2:00 PM 0.5 mL 2021, Given Today Intramuscular    MENINGOCOCCAL ACWY (MENQUADFI ) 24  2:00 PM 0.5 mL 08/15/2019, Given Today Intramuscular    Meningococcal B (Bexsero ) 24  1:59 PM 0.5 mL 2021, Given Today Intramuscular    Pneumo Conj 13-V (2010&after) 24  1:57 PM 0.5 mL 2021, Given Today Intramuscular            Tia ALINA Payne (AAMA)

## 2024-02-05 ENCOUNTER — PATIENT OUTREACH (OUTPATIENT)
Dept: ONCOLOGY | Facility: CLINIC | Age: 54
End: 2024-02-05
Payer: COMMERCIAL

## 2024-02-05 NOTE — PROGRESS NOTES
[facility]: Surgical Oncology Cancer Care Short Note                                     Discussion with Patient:                                                       INBOUND CALL:     Received voice mail message from Allyssa stating she has decided to cancel surgery on 2/13/2024 with Dr. Dennis and Dr. Giang. She will be having surgery with [place].     Message sent to  Dr. Dennis and OR scheduling team regarding cancelled surgery.          Lee Ann Norieag, RNCC  [facility]   Surgical Oncology

## 2024-03-18 ENCOUNTER — OFFICE VISIT (OUTPATIENT)
Dept: FAMILY MEDICINE | Facility: CLINIC | Age: 54
End: 2024-03-18
Payer: COMMERCIAL

## 2024-03-18 VITALS
HEIGHT: 64 IN | SYSTOLIC BLOOD PRESSURE: 117 MMHG | RESPIRATION RATE: 16 BRPM | OXYGEN SATURATION: 98 % | HEART RATE: 104 BPM | BODY MASS INDEX: 30.68 KG/M2 | DIASTOLIC BLOOD PRESSURE: 74 MMHG | WEIGHT: 179.7 LBS | TEMPERATURE: 98.4 F

## 2024-03-18 DIAGNOSIS — E78.2 MIXED HYPERLIPIDEMIA: Primary | ICD-10-CM

## 2024-03-18 DIAGNOSIS — B37.0 THRUSH: ICD-10-CM

## 2024-03-18 PROCEDURE — 99213 OFFICE O/P EST LOW 20 MIN: CPT

## 2024-03-18 RX ORDER — FLUCONAZOLE 100 MG/1
TABLET ORAL
Qty: 11 TABLET | Refills: 0 | Status: SHIPPED | OUTPATIENT
Start: 2024-03-18 | End: 2024-03-28

## 2024-03-18 RX ORDER — FLUCONAZOLE 100 MG/1
TABLET ORAL
Qty: 8 TABLET | Refills: 0 | Status: SHIPPED | OUTPATIENT
Start: 2024-03-18 | End: 2024-03-18

## 2024-03-18 NOTE — PROGRESS NOTES
Assessment & Plan   Problem List Items Addressed This Visit       Mixed hyperlipidemia - Primary    Thrush     Given suboptimal response to topical therapy, will move to oral fluconazole as is recommended.  Prescription sent for 200 mg x 1 day, followed by 9 days of 100 mg daily.  This treatment can be extended if needed.  Expected therapeutic effects and potential side effects were discussed today.  Additional information was provided in her after visit summary.  White coating is easily scraped off, so thrush is the leading differential, but if she fails to respond to oral therapy would recommend evaluation by ENT.  Patient expressed understanding of and agreement with this plan.  All questions were answered.         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 )      3/18/2024     9:48 AM   Additional Questions   Roomed by Percy Ro MA   Accompanied by Self         3/18/2024     9:48 AM   Patient Reported Additional Medications   Patient reports taking the following new medications None     Patient presents today to discuss ongoing issues with thrush.  She notes that she recently underwent surgery, during which she was administered antibiotics.  After surgery, she developed a white coating to the tongue.  She was treated with lozenges which did improve the amount of coating, however it is persisting and she is experiencing a sore throat as well.  She notes a metallic taste to her mouth which is affecting food.  She is able to easily scrape the coating off.  No history of similar symptoms    History of Present Illness       Reason for visit:  Oral thrush  Symptom onset:  3-4 weeks ago  Symptoms include:  White film on tongue, food tastes  "off,  sore throat  Symptom intensity:  Moderate  Symptom progression:  Staying the same  Had these symptoms before:  Yes  Has tried/received treatment for these symptoms:  Yes  Previous treatment was successful:  No  What makes it worse:  Spicy food  What makes it better:  [place] food    She eats 2-3 servings of fruits and vegetables daily.She consumes 1 sweetened beverage(s) daily.She exercises with enough effort to increase her heart rate 9 or less minutes per day.  She exercises with enough effort to increase her heart rate 3 or less days per week.   She is taking medications regularly.         Objective    /74 (BP Location: Left arm, Patient Position: Sitting, Cuff Size: Adult Large)   Pulse 104   Temp 98.4  F (36.9  C) (Oral)   Resp 16   Ht 1.626 m (5' 4\")   Wt 81.5 kg (179 lb 11.2 oz)   SpO2 98%   BMI 30.85 kg/m    Body mass index is 30.85 kg/m .    Physical Exam  Vitals and nursing note reviewed.   Constitutional:       Appearance: Normal appearance.   HENT:      Mouth/Throat:      Comments: Thin, white coating to dorsal aspect of tongue. No coating elsewhere. Able to be scraped off with tongue depressor.   Cardiovascular:      Rate and Rhythm: Normal rate and regular rhythm.   Pulmonary:      Effort: Pulmonary effort is normal.   Neurological:      Mental Status: She is alert.             Signed Electronically by: SARITHA Barajas CNP    "

## 2024-08-28 ENCOUNTER — OFFICE VISIT (OUTPATIENT)
Dept: FAMILY MEDICINE | Facility: CLINIC | Age: 54
End: 2024-08-28
Payer: COMMERCIAL

## 2024-08-28 VITALS
HEART RATE: 79 BPM | RESPIRATION RATE: 16 BRPM | BODY MASS INDEX: 28.07 KG/M2 | WEIGHT: 164.4 LBS | HEIGHT: 64 IN | DIASTOLIC BLOOD PRESSURE: 63 MMHG | OXYGEN SATURATION: 100 % | TEMPERATURE: 97.8 F | SYSTOLIC BLOOD PRESSURE: 104 MMHG

## 2024-08-28 DIAGNOSIS — E78.2 MIXED HYPERLIPIDEMIA: ICD-10-CM

## 2024-08-28 DIAGNOSIS — G47.00 INSOMNIA, UNSPECIFIED TYPE: ICD-10-CM

## 2024-08-28 DIAGNOSIS — R21 RASH: ICD-10-CM

## 2024-08-28 DIAGNOSIS — F90.0 ADHD, PREDOMINANTLY INATTENTIVE TYPE: Primary | ICD-10-CM

## 2024-08-28 DIAGNOSIS — R40.0 HAS DAYTIME DROWSINESS: ICD-10-CM

## 2024-08-28 PROCEDURE — 99214 OFFICE O/P EST MOD 30 MIN: CPT | Performed by: FAMILY MEDICINE

## 2024-08-28 RX ORDER — ESTRADIOL 0.07 MG/D
1 FILM, EXTENDED RELEASE TRANSDERMAL
COMMUNITY
Start: 2024-05-13

## 2024-08-28 RX ORDER — LISDEXAMFETAMINE DIMESYLATE 30 MG/1
1 CAPSULE ORAL DAILY
COMMUNITY

## 2024-08-28 RX ORDER — CLONIDINE HYDROCHLORIDE 0.1 MG/1
.05-.1 TABLET ORAL
Qty: 30 TABLET | Refills: 1 | Status: SHIPPED | OUTPATIENT
Start: 2024-08-28

## 2024-08-28 RX ORDER — LISDEXAMFETAMINE DIMESYLATE 30 MG/1
30 CAPSULE ORAL DAILY
Status: CANCELLED | OUTPATIENT
Start: 2024-08-28

## 2024-08-28 RX ORDER — LISDEXAMFETAMINE DIMESYLATE 30 MG/1
30 CAPSULE ORAL DAILY
Qty: 30 CAPSULE | Refills: 0 | Status: SHIPPED | OUTPATIENT
Start: 2024-10-27 | End: 2024-11-26

## 2024-08-28 RX ORDER — BISACODYL 5 MG
TABLET, DELAYED RELEASE (ENTERIC COATED) ORAL
COMMUNITY
Start: 2024-02-09

## 2024-08-28 RX ORDER — TRIAMCINOLONE ACETONIDE 55 UG/1
2 SPRAY, METERED NASAL
COMMUNITY
Start: 2024-08-14

## 2024-08-28 RX ORDER — LISDEXAMFETAMINE DIMESYLATE 30 MG/1
30 CAPSULE ORAL DAILY
Qty: 30 CAPSULE | Refills: 0 | Status: SHIPPED | OUTPATIENT
Start: 2024-08-28 | End: 2024-09-27

## 2024-08-28 RX ORDER — LISDEXAMFETAMINE DIMESYLATE 30 MG/1
30 CAPSULE ORAL DAILY
Qty: 30 CAPSULE | Refills: 0 | Status: SHIPPED | OUTPATIENT
Start: 2024-09-27 | End: 2024-10-27

## 2024-08-28 RX ORDER — ESTRADIOL 0.1 MG/D
FILM, EXTENDED RELEASE TRANSDERMAL
COMMUNITY
Start: 2024-04-07

## 2024-08-28 SDOH — HEALTH STABILITY: PHYSICAL HEALTH: ON AVERAGE, HOW MANY DAYS PER WEEK DO YOU ENGAGE IN MODERATE TO STRENUOUS EXERCISE (LIKE A BRISK WALK)?: 4 DAYS

## 2024-08-28 SDOH — HEALTH STABILITY: PHYSICAL HEALTH: ON AVERAGE, HOW MANY MINUTES DO YOU ENGAGE IN EXERCISE AT THIS LEVEL?: 40 MIN

## 2024-08-28 NOTE — LETTER
[facility]  08/28/24  Patient: Allyssa Pedersen  YOB: 1970  Medical Record Number: [id number]                                                                                  Non-Opioid Controlled Substance Agreement    This is an agreement between you and your provider regarding safe and appropriate use of controlled substances prescribed by your care team. Controlled substances are?medicines that can cause physical and mental dependence (abuse).     There are strict laws about having and using these medicines. We here at [facility] are  committed to working with you in your efforts to get better. To support you in this work, we'll help you schedule regular office appointments for medicine refills. If we must cancel or change your appointment for any reason, we'll make sure you have enough medicine to last until your next appointment.     As a Provider, I will:   Listen carefully to your concerns while treating you with respect.   Recommend a treatment plan that I believe is in your best interest and may involve therapies other than medicine.    Talk with you often about the possible benefits and the risk of harm of any medicine that we prescribe for you.  Assess the safety of this medicine and check how well it works.    Provide a plan on how to taper (discontinue or go off) using this medicine if the decision is made to stop its use.      ::  As a Patient, I understand controlled substances:     Are prescribed by my care provider to help me function or work and manage my condition(s).?  Are strong medicines and can cause serious side effects.     Need to be taken exactly as prescribed.?Combining controlled substances with certain medicines or chemicals (such as illegal drugs, alcohol, sedatives, sleeping pills, and benzodiazepines) can be dangerous or even fatal.? If I stop taking my medicines suddenly, I may have severe withdrawal symptoms.     The risks, benefits, and  side effects of these medicine(s) were explained to me. I agree that:    I will take part in other treatments as advised by my care team. This may be psychiatry or counseling, physical therapy, behavioral therapy, group treatment or a referral to specialist.    I will keep all my appointments and understand this is part of the monitoring of controlled substances.?My care team may require an office visit for EVERY controlled substance refill. If I miss appointments or don t follow instructions, my care team may stop my medicine    I will take my medicines as prescribed. I will not change the dose or schedule unless my care team tells me to. There will be no refills if I run out early.      I may be asked to come to the clinic and complete a urine drug test or complete a pill count. If I don t give a urine sample or participate in a pill count, the care team may stop my medicine.    I will only receive controlled substance prescriptions from this clinic. If I am treated by another provider, I will tell them that I am taking controlled substances and that I have a treatment agreement with this provider. I will inform my [facility] care team within one business day if I am given a prescription for any controlled substance by another healthcare provider. My [facility] care team can contact other providers and pharmacists about my use of any medicines.    It is up to me to make sure that I don't run out of my medicines on weekends or holidays.?If my care team is willing to refill my prescription without a visit, I must request refills only during office hours. Refills may take up to 3 business days to process. I will use one pharmacy to fill all my controlled substance prescriptions. I will notify the clinic about any changes to my insurance or medicine availability.    I am responsible for my prescriptions. If the medicine/prescription is lost, stolen or destroyed, it will not be replaced.?I also agree not  to share controlled substance medicines with anyone.     I am aware I should not use any illegal or recreational drugs. I agree not to drink alcohol unless my care team says I can.     If I enroll in the [place] Medical Cannabis program, I will tell my care team before my next refill.    I will tell my care team right away if I become pregnant, have a new medical problem treated outside of my regular clinic, or have a change in my medicines.     I understand that this medicine can affect my thinking, judgment and reaction time.? Alcohol and drugs affect the brain and body, which can affect the safety of my driving. Being under the influence of alcohol or drugs can affect my decision-making, behaviors, personal safety and the safety of others. Driving while impaired (DWI) can occur if a person is driving, operating or in physical control of a car, motorcycle, boat, snowmobile, ATV, motorbike, off-road vehicle or any other motor vehicle ([place] Statute 169A.20). I understand the risk if I choose to drive or operate any vehicle or machinery.    I understand that if I do not follow any of the conditions above, my prescriptions or treatment may be stopped or changed.   I agree that my provider, clinic care team and pharmacy may work with any city, state or federal law enforcement agency that investigates the misuse, sale or other diversion of my controlled medicine. I will allow my provider to discuss my care with, or share a copy of, this agreement with any other treating provider, pharmacy or emergency room where I receive care.     I have read this agreement and have asked questions about anything I did not understand.    ________________________________________________________  Patient Signature - Allyssa Pedersen     ___________________                   Date     ________________________________________________________  Provider Signature - Melody De Guzman MD       ___________________                   Date      ________________________________________________________  Witness Signature (required if provider not present while patient signing)          ___________________                   Date

## 2024-08-28 NOTE — PROGRESS NOTES
Problem List Items Addressed This Visit          Endocrine    Mixed hyperlipidemia       Musculoskeletal and Integumentary    Rash     Itchy mild diffuse rash all over body since starting nasal spray otc - nasonex. So she stopped it and she wants to see if the rash will improve now, will watch and wait.     Discussed medrol dose pack, but she declined.          Relevant Medications    triamcinolone (NASACORT) 55 MCG/ACT nasal aerosol       Behavioral    ADHD, predominantly inattentive type - Primary     Will sign csa today,   Refilled vyvanse 30 mg po q day.  Was using clonidine 0.1mg q hs to sleep, refilled.   Follow up 3-6 months.         Relevant Medications    lisdexamfetamine (VYVANSE) 30 MG capsule    lisdexamfetamine (VYVANSE) 30 MG capsule    lisdexamfetamine (VYVANSE) 30 MG capsule (Start on 9/27/2024)    lisdexamfetamine (VYVANSE) 30 MG capsule (Start on 10/27/2024)    cloNIDine (CATAPRES) 0.1 MG tablet       Other    RESOLVED: Has daytime drowsiness     Resolved. She thinks being healthy now makes her sleep better.           Other Visit Diagnoses       Insomnia, unspecified type        Relevant Medications    cloNIDine (CATAPRES) 0.1 MG tablet             Alessandra Spivey is a 54 year old, presenting for the following health issues:  Recheck Medication        8/28/2024    10:21 AM   Additional Questions   Roomed by Percy Ro MA   Accompanied by Self         8/28/2024    10:21 AM   Patient Reported Additional Medications   Patient reports taking the following new medications None     History of Present Illness       Reason for visit:  ADHD medication refill    She eats 4 or more servings of fruits and vegetables daily.She consumes 0 sweetened beverage(s) daily.She exercises with enough effort to increase her heart rate 30 to 60 minutes per day.  She exercises with enough effort to increase her heart rate 4 days per week. She is missing 7 dose(s) of medications per week.  She is not taking prescribed  "medications regularly due to other.           Objective    /63 (BP Location: Left arm, Patient Position: Sitting, Cuff Size: Adult Large)   Pulse 79   Temp 97.8  F (36.6  C) (Oral)   Resp 16   Ht 1.626 m (5' 4\")   Wt 74.6 kg (164 lb 6.4 oz)   SpO2 100%   BMI 28.22 kg/m    Body mass index is 28.22 kg/m .  Physical Exam  Constitutional:       Appearance: Normal appearance.   HENT:      Head: Normocephalic and atraumatic.   Cardiovascular:      Rate and Rhythm: Normal rate and regular rhythm.   Pulmonary:      Effort: Pulmonary effort is normal.   Musculoskeletal:         General: Normal range of motion.      Cervical back: Normal range of motion and neck supple.   Neurological:      General: No focal deficit present.      Mental Status: She is alert and oriented to person, place, and time.                Signed Electronically by: Melody De Guzman MD    "

## 2024-08-28 NOTE — ASSESSMENT & PLAN NOTE
Will sign csa today,   Refilled vyvanse 30 mg po q day.  Was using clonidine 0.1mg q hs to sleep, refilled.   Follow up 3-6 months.

## 2024-08-28 NOTE — ASSESSMENT & PLAN NOTE
Itchy mild diffuse rash all over body since starting nasal spray otc - nasonex. So she stopped it and she wants to see if the rash will improve now, will watch and wait.     Discussed medrol dose pack, but she declined.

## 2024-09-17 ENCOUNTER — OFFICE VISIT (OUTPATIENT)
Dept: FAMILY MEDICINE | Facility: CLINIC | Age: 54
End: 2024-09-17
Payer: COMMERCIAL

## 2024-09-17 VITALS
BODY MASS INDEX: 27.66 KG/M2 | SYSTOLIC BLOOD PRESSURE: 102 MMHG | WEIGHT: 162 LBS | HEIGHT: 64 IN | TEMPERATURE: 97.9 F | RESPIRATION RATE: 16 BRPM | DIASTOLIC BLOOD PRESSURE: 69 MMHG | HEART RATE: 69 BPM | OXYGEN SATURATION: 99 %

## 2024-09-17 DIAGNOSIS — R00.2 PALPITATIONS: ICD-10-CM

## 2024-09-17 DIAGNOSIS — Z13.0 SCREENING, ANEMIA, DEFICIENCY, IRON: ICD-10-CM

## 2024-09-17 DIAGNOSIS — Z12.31 ENCOUNTER FOR SCREENING MAMMOGRAM FOR MALIGNANT NEOPLASM OF BREAST: ICD-10-CM

## 2024-09-17 DIAGNOSIS — N92.6 IRREGULAR MENSES: ICD-10-CM

## 2024-09-17 DIAGNOSIS — C78.6 PSEUDOMYXOMA PERITONEI (H): ICD-10-CM

## 2024-09-17 DIAGNOSIS — Z86.39 HX OF OBESITY: ICD-10-CM

## 2024-09-17 DIAGNOSIS — F90.0 ADHD, PREDOMINANTLY INATTENTIVE TYPE: ICD-10-CM

## 2024-09-17 DIAGNOSIS — Z00.00 ROUTINE GENERAL MEDICAL EXAMINATION AT A HEALTH CARE FACILITY: Primary | ICD-10-CM

## 2024-09-17 DIAGNOSIS — K76.0 NAFLD (NONALCOHOLIC FATTY LIVER DISEASE): ICD-10-CM

## 2024-09-17 DIAGNOSIS — E78.2 MIXED HYPERLIPIDEMIA: ICD-10-CM

## 2024-09-17 DIAGNOSIS — Z13.228 SCREENING FOR METABOLIC DISORDER: ICD-10-CM

## 2024-09-17 DIAGNOSIS — E66.3 OVERWEIGHT WITH BODY MASS INDEX (BMI) OF 27 TO 27.9 IN ADULT: ICD-10-CM

## 2024-09-17 DIAGNOSIS — Z85.09 HISTORY OF MALIGNANT NEOPLASM OF APPENDIX: ICD-10-CM

## 2024-09-17 DIAGNOSIS — B37.0 THRUSH: ICD-10-CM

## 2024-09-17 DIAGNOSIS — Z00.00 HEALTH CARE MAINTENANCE: ICD-10-CM

## 2024-09-17 PROCEDURE — 99213 OFFICE O/P EST LOW 20 MIN: CPT | Mod: 25 | Performed by: FAMILY MEDICINE

## 2024-09-17 PROCEDURE — 99396 PREV VISIT EST AGE 40-64: CPT | Mod: 25 | Performed by: FAMILY MEDICINE

## 2024-09-17 PROCEDURE — 90480 ADMN SARSCOV2 VAC 1/ONLY CMP: CPT | Performed by: FAMILY MEDICINE

## 2024-09-17 PROCEDURE — 91320 SARSCV2 VAC 30MCG TRS-SUC IM: CPT | Performed by: FAMILY MEDICINE

## 2024-09-17 NOTE — ASSESSMENT & PLAN NOTE
Nafld. Weight reduction recommended.  She  is participating in the Weight Loss Program at [facility].     Repeat ultrasound ordered now to monitor and if still present would offer Hepatology consult

## 2024-09-17 NOTE — ASSESSMENT & PLAN NOTE
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.     02/14/2024 DEBULKING INTRA-ABDOMINAL TUMOR., HYPERTHERMIC INTRAPERITONEAL CHEMOTHERAPY PROCEDURE., HYSTERECTOMY TOTAL ABDOMINAL, SALPINGO-OOPHORECTOMY, APPENDECTOMY., SPLENECTOMY., STRIPPING DIAPHRAGM., CYSTOSCOPY INSERTION STENT URETER, OMENTECTOMY Keith Campbell M.D.Kearse, Ladonna E, M.D.Flaherty, Emily G, M.D.Warner, J, M.D.Sax-Bolder, Anessa N, M.D. [facility] OR

## 2024-09-17 NOTE — ASSESSMENT & PLAN NOTE
Contraception - iud.   Recommended vaccines: Influenza and COVID  Pap: Negative Pap and HPV in 2021-will repeat in 2026  Mammo: Normal August 2023  Colonoscopy: Negative Cologuard in 2023  discussed colonoscopy for her next screening.   Std testing desired:  offered  Osteoporosis prevention discussed.  vitamin d levels ordered. Recommend daily calcium and vitamin d intake to keep good bone health. Recommend weight bearing exercise, no tobacco, and limit alcohol  dexa  -normal in 2023  Recommend sunscreen, exercise, & healthy diet.  Offered cbc, cmp, lipids and asked what other testing she  desires today  I have had an Advance Directives discussion with the patient. She has advanced directive at home.  Body mass index is 27.81 kg/m .   aliyah active.

## 2024-09-17 NOTE — PATIENT INSTRUCTIONS
Patient Education   Preventive Care Advice   This is general advice given by our system to help you stay healthy. However, your care team may have specific advice just for you. Please talk to your care team about your preventive care needs.  Nutrition  Eat 5 or more servings of fruits and vegetables each day.  Try wheat bread, brown rice and whole grain pasta (instead of white bread, rice, and pasta).  Get enough calcium and vitamin D. Check the label on foods and aim for 100% of the RDA (recommended daily allowance).  Lifestyle  Exercise at least 150 minutes each week  (30 minutes a day, 5 days a week).  Do muscle strengthening activities 2 days a week. These help control your weight and prevent disease.  No smoking.  Wear sunscreen to prevent skin cancer.  Have a dental exam and cleaning every 6 months.  Yearly exams  See your health care team every year to talk about:  Any changes in your health.  Any medicines your care team has prescribed.  Preventive care, family planning, and ways to prevent chronic diseases.  Shots (vaccines)   HPV shots (up to age 26), if you've never had them before.  Hepatitis B shots (up to age 59), if you've never had them before.  COVID-19 shot: Get this shot when it's due.  Flu shot: Get a flu shot every year.  Tetanus shot: Get a tetanus shot every 10 years.  Pneumococcal, hepatitis A, and RSV shots: Ask your care team if you need these based on your risk.  Shingles shot (for age 50 and up)  General health tests  Diabetes screening:  Starting at age 35, Get screened for diabetes at least every 3 years.  If you are younger than age 35, ask your care team if you should be screened for diabetes.  Cholesterol test: At age 39, start having a cholesterol test every 5 years, or more often if advised.  Bone density scan (DEXA): At age 50, ask your care team if you should have this scan for osteoporosis (brittle bones).  Hepatitis C: Get tested at least once in your life.  STIs (sexually  transmitted infections)  Before age 24: Ask your care team if you should be screened for STIs.  After age 24: Get screened for STIs if you're at risk. You are at risk for STIs (including HIV) if:  You are sexually active with more than one person.  You don't use condoms every time.  You or a partner was diagnosed with a sexually transmitted infection.  If you are at risk for HIV, ask about PrEP medicine to prevent HIV.  Get tested for HIV at least once in your life, whether you are at risk for HIV or not.  Cancer screening tests  Cervical cancer screening: If you have a cervix, begin getting regular cervical cancer screening tests starting at age 21.  Breast cancer scan (mammogram): If you've ever had breasts, begin having regular mammograms starting at age 40. This is a scan to check for breast cancer.  Colon cancer screening: It is important to start screening for colon cancer at age 45.  Have a colonoscopy test every 10 years (or more often if you're at risk) Or, ask your provider about stool tests like a FIT test every year or Cologuard test every 3 years.  To learn more about your testing options, visit:   .  For help making a decision, visit:   https://bit.ly/cx86401.  Prostate cancer screening test: If you have a prostate, ask your care team if a prostate cancer screening test (PSA) at age 55 is right for you.  Lung cancer screening: If you are a current or former smoker ages 50 to 80, ask your care team if ongoing lung cancer screenings are right for you.  For informational purposes only. Not to replace the advice of your health care provider. Copyright   2023 [employer]. All rights reserved. Clinically reviewed by the [facility] Transitions Program. [employer] [id number] - REV 01/24.

## 2024-09-17 NOTE — PROGRESS NOTES
Preventive Care Visit  [facility]  Melody De Guzman MD, Family Medicine  Sep 17, 2024    In addition to the preventive service, I spent 20 minutes discussing the patient's nafld and ordering imaging study.        Problem List Items Addressed This Visit          Digestive    Fatty liver     Nafld. Weight reduction recommended.  She  is participating in the Weight Loss Program at [facility].     Repeat ultrasound ordered now to monitor and if still present would offer Hepatology consult         RESOLVED: Thrush     Resolved.             Endocrine    Mixed hyperlipidemia - Primary     Hyperlipidemia. Weight reduction recommended.  She  is participating in the Weight Loss Program at [facility].   Will recheck lipids now. Info on statins given and coronary calcium score discussed.          Relevant Orders    CT Coronary Calcium Scan    Lipid Profile       Circulatory    RESOLVED: Palpitations     Resolved.             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            Urinary    RESOLVED: Irregular menses     Had tahbso            Behavioral    Attention deficit hyperactivity disorder (ADHD), predominantly inattentive type     Continue Vyvanse 30 mg p.o. daily.  Follow-up in 3-6 months            Other    History of malignant neoplasm of appendix     Hx malignant neoplasm of the appendix with removal at [facility].     02/14/2024 DEBULKING INTRA-ABDOMINAL TUMOR., HYPERTHERMIC INTRAPERITONEAL CHEMOTHERAPY PROCEDURE., HYSTERECTOMY TOTAL ABDOMINAL, SALPINGO-OOPHORECTOMY, APPENDECTOMY., SPLENECTOMY., STRIPPING DIAPHRAGM., CYSTOSCOPY INSERTION STENT URETER, OMENTECTOMY Keith Campbell M.D.Marisol Carreon M.D.Flaherty, Emily G, M.D.Warner, J, M.D.Sax-Bolder, Anessa N, M.D. [facility]            Overweight with body mass index (BMI) of 27 to 27.9 in adult     Weight improving. Hx  obesity.   Current bmi 27.81.   Continue working on healthy lifestyle.          Health care maintenance     Contraception - iud.   Recommended vaccines: Influenza and COVID  Pap: Negative Pap and HPV in 2021-will repeat in 2026  Mammo: Normal August 2023  Colonoscopy: Negative Cologuard in 2023  discussed colonoscopy for her next screening.   Std testing desired:  offered  Osteoporosis prevention discussed.  vitamin d levels ordered. Recommend daily calcium and vitamin d intake to keep good bone health. Recommend weight bearing exercise, no tobacco, and limit alcohol  dexa  -normal in 2023  Recommend sunscreen, exercise, & healthy diet.  Offered cbc, cmp, lipids and asked what other testing she  desires today  I have had an Advance Directives discussion with the patient. She has advanced directive at home.  Body mass index is 27.81 kg/m .   mychart active.          Other Visit Diagnoses       Hx of obesity        Routine general medical examination at a health care facility        Screening for metabolic disorder        Relevant Orders    Comprehensive metabolic panel (BMP + Alb, Alk Phos, ALT, AST, Total. Bili, TP)    Screening, anemia, deficiency, iron        Relevant Orders    CBC with platelets    Encounter for screening mammogram for malignant neoplasm of breast        Relevant Orders    MA Screen Bilateral w/Luis             Subjective   Allyssa is a 54 year old, presenting for the following:  Physical        9/17/2024     1:45 PM   Additional Questions   Roomed by as   Accompanied by self         9/17/2024     1:45 PM   Patient Reported Additional Medications   Patient reports taking the following new medications no        Health Care Directive  Patient does not have a Health Care Directive or Living Will: Patient states has Advance Directive and will bring in a copy to clinic.          9/17/2024   General Health   How would you rate your overall physical health? Good   Feel stress (tense, anxious, or unable to  sleep) Not at all            9/17/2024   Nutrition   Three or more servings of calcium each day? Yes   Diet: Regular (no restrictions)   How many servings of fruit and vegetables per day? (!) 2-3 - info put in chart   How many sweetened beverages each day? 0-1            9/17/2024   Exercise   Days per week of moderate/strenous exercise 4 days   Average minutes spent exercising at this level 40 min            9/17/2024   Social Factors   Frequency of gathering with friends or relatives Twice a week   Worry food won't last until get money to buy more No   Food not last or not have enough money for food? No   Do you have housing? (Housing is defined as stable permanent housing and does not include staying ouside in a car, in a tent, in an abandoned building, in an overnight shelter, or couch-surfing.) Yes   Are you worried about losing your housing? No   Lack of transportation? No   Unable to get utilities (heat,electricity)? No            9/17/2024   Fall Risk   Fallen 2 or more times in the past year? No   Trouble with walking or balance? No             9/17/2024   Dental   Dentist two times every year? Yes            9/17/2024   TB Screening   Were you born outside of the [place]? No              9/17/2024   Substance Use   Alcohol more than 3/day or more than 7/wk No   Do you use any other substances recreationally? No        Social History     Tobacco Use     Smoking status: Never     Smokeless tobacco: Never   Vaping Use     Vaping status: Never Used   Substance Use Topics     Alcohol use: Yes     Alcohol/week: 1.0 standard drink of alcohol     Types: 1 Standard drinks or equivalent per week     Comment: Alcoholic Drinks/day: 1/week     Drug use: Never           8/1/2023   LAST FHS-7 RESULTS   1st degree relative breast or ovarian cancer No   Any relative bilateral breast cancer No   Any male have breast cancer No   Any ONE woman have BOTH breast AND ovarian cancer No   Any woman with breast cancer before 50yrs No  "  2 or more relatives with breast AND/OR ovarian cancer No   2 or more relatives with breast AND/OR bowel cancer No     Mammogram Screening - Mammogram every 1-2 years updated in Health Maintenance based on mutual decision making        9/17/2024   STI Screening   New sexual partner(s) since last STI/HIV test? No            Latest Ref Rng & Units 9/14/2021     9:11 AM 11/18/2016     9:40 AM   PAP / HPV   PAP  Negative for Intraepithelial Lesion or Malignancy (NILM)  Negative for squamous intraepithelial lesion or malignancy  Electronically signed by Renay Bill CT (ASCP) on 11/28/2016 at  2:30 PM      HPV 16 DNA Negative Negative     HPV 18 DNA Negative Negative     Other HR HPV Negative Negative       ASCVD Risk   The 10-year ASCVD risk score (Jazlyn WATERS, et al., 2019) is: 1.1%    Values used to calculate the score:      Age: 54 years      Sex: Female      Is Non- : No      Diabetic: No      Tobacco smoker: No      Systolic Blood Pressure: 102 mmHg      Is BP treated: No      HDL Cholesterol: 50 mg/dL      Total Cholesterol: 174 mg/dL      Reviewed and updated as needed this visit by Provider   Tobacco  Allergies  Meds  Problems  Med Hx  Surg Hx  Fam Hx  Soc   Hx Sexual Activity               Objective    Exam  /69 (BP Location: Left arm, Patient Position: Left side, Cuff Size: Adult Large)   Pulse 69   Temp 97.9  F (36.6  C) (Oral)   Resp 16   Ht 1.626 m (5' 4\")   Wt 73.5 kg (162 lb)   SpO2 99%   BMI 27.81 kg/m     Estimated body mass index is 27.81 kg/m  as calculated from the following:    Height as of this encounter: 1.626 m (5' 4\").    Weight as of this encounter: 73.5 kg (162 lb).    Physical Exam  Constitutional:       Appearance: Normal appearance.   HENT:      Head: Normocephalic and atraumatic.      Right Ear: Tympanic membrane, ear canal and external ear normal.      Left Ear: Tympanic membrane, ear canal and external ear normal.      Nose: Nose " normal.      Mouth/Throat:      Mouth: Mucous membranes are moist.      Pharynx: Oropharynx is clear.   Eyes:      Conjunctiva/sclera: Conjunctivae normal.      Pupils: Pupils are equal, round, and reactive to light.   Cardiovascular:      Rate and Rhythm: Normal rate and regular rhythm.      Heart sounds: Normal heart sounds.   Pulmonary:      Effort: Pulmonary effort is normal.      Breath sounds: Normal breath sounds.   Abdominal:      General: Bowel sounds are normal.      Palpations: Abdomen is soft.   Musculoskeletal:         General: Normal range of motion.      Cervical back: Normal range of motion and neck supple.   Neurological:      General: No focal deficit present.      Mental Status: She is alert and oriented to person, place, and time.             Signed Electronically by: Melody De Guzman MD

## 2024-09-17 NOTE — ASSESSMENT & PLAN NOTE
Hyperlipidemia. Weight reduction recommended.  She  is participating in the Weight Loss Program at [facility].   Will recheck lipids now. Info on statins given and coronary calcium score discussed.

## 2024-09-18 ENCOUNTER — ALLIED HEALTH/NURSE VISIT (OUTPATIENT)
Dept: FAMILY MEDICINE | Facility: CLINIC | Age: 54
End: 2024-09-18
Payer: COMMERCIAL

## 2024-09-18 ENCOUNTER — LAB (OUTPATIENT)
Dept: LAB | Facility: CLINIC | Age: 54
End: 2024-09-18
Payer: COMMERCIAL

## 2024-09-18 DIAGNOSIS — Z13.0 SCREENING, ANEMIA, DEFICIENCY, IRON: ICD-10-CM

## 2024-09-18 DIAGNOSIS — E78.2 MIXED HYPERLIPIDEMIA: ICD-10-CM

## 2024-09-18 DIAGNOSIS — Z23 NEED FOR VACCINATION: Primary | ICD-10-CM

## 2024-09-18 DIAGNOSIS — Z13.228 SCREENING FOR METABOLIC DISORDER: ICD-10-CM

## 2024-09-18 LAB
ALBUMIN SERPL BCG-MCNC: 3.9 G/DL (ref 3.5–5.2)
ALP SERPL-CCNC: 77 U/L (ref 40–150)
ALT SERPL W P-5'-P-CCNC: 18 U/L (ref 0–50)
ANION GAP SERPL CALCULATED.3IONS-SCNC: 7 MMOL/L (ref 7–15)
AST SERPL W P-5'-P-CCNC: 25 U/L (ref 0–45)
BILIRUB SERPL-MCNC: 0.2 MG/DL
BUN SERPL-MCNC: 15.4 MG/DL (ref 6–20)
CALCIUM SERPL-MCNC: 9.2 MG/DL (ref 8.8–10.4)
CHLORIDE SERPL-SCNC: 103 MMOL/L (ref 98–107)
CHOLEST SERPL-MCNC: 141 MG/DL
CREAT SERPL-MCNC: 0.8 MG/DL (ref 0.51–0.95)
EGFRCR SERPLBLD CKD-EPI 2021: 87 ML/MIN/1.73M2
ERYTHROCYTE [DISTWIDTH] IN BLOOD BY AUTOMATED COUNT: 22.5 % (ref 10–15)
FASTING STATUS PATIENT QL REPORTED: YES
FASTING STATUS PATIENT QL REPORTED: YES
GLUCOSE SERPL-MCNC: 95 MG/DL (ref 70–99)
HCO3 SERPL-SCNC: 29 MMOL/L (ref 22–29)
HCT VFR BLD AUTO: 39.8 % (ref 35–47)
HDLC SERPL-MCNC: 50 MG/DL
HGB BLD-MCNC: 12.8 G/DL (ref 11.7–15.7)
LDLC SERPL CALC-MCNC: 78 MG/DL
MCH RBC QN AUTO: 24 PG (ref 26.5–33)
MCHC RBC AUTO-ENTMCNC: 32.2 G/DL (ref 31.5–36.5)
MCV RBC AUTO: 75 FL (ref 78–100)
NONHDLC SERPL-MCNC: 91 MG/DL
PLATELET # BLD AUTO: 332 10E3/UL (ref 150–450)
POTASSIUM SERPL-SCNC: 4.4 MMOL/L (ref 3.4–5.3)
PROT SERPL-MCNC: 7 G/DL (ref 6.4–8.3)
RBC # BLD AUTO: 5.34 10E6/UL (ref 3.8–5.2)
SODIUM SERPL-SCNC: 139 MMOL/L (ref 135–145)
TRIGL SERPL-MCNC: 63 MG/DL
WBC # BLD AUTO: 5.6 10E3/UL (ref 4–11)

## 2024-09-18 PROCEDURE — 91320 SARSCV2 VAC 30MCG TRS-SUC IM: CPT

## 2024-09-18 PROCEDURE — 90480 ADMN SARSCOV2 VAC 1/ONLY CMP: CPT

## 2024-09-18 PROCEDURE — 36415 COLL VENOUS BLD VENIPUNCTURE: CPT

## 2024-09-18 PROCEDURE — 90656 IIV3 VACC NO PRSV 0.5 ML IM: CPT

## 2024-09-18 PROCEDURE — 80053 COMPREHEN METABOLIC PANEL: CPT

## 2024-09-18 PROCEDURE — 80061 LIPID PANEL: CPT

## 2024-09-18 PROCEDURE — 85027 COMPLETE CBC AUTOMATED: CPT

## 2024-09-18 PROCEDURE — 99207 PR NO CHARGE NURSE ONLY: CPT

## 2024-09-18 PROCEDURE — 90471 IMMUNIZATION ADMIN: CPT

## 2024-12-12 ENCOUNTER — PATIENT OUTREACH (OUTPATIENT)
Dept: CARE COORDINATION | Facility: CLINIC | Age: 54
End: 2024-12-12
Payer: COMMERCIAL

## 2025-01-01 ENCOUNTER — MYC REFILL (OUTPATIENT)
Dept: FAMILY MEDICINE | Facility: CLINIC | Age: 55
End: 2025-01-01
Payer: COMMERCIAL

## 2025-01-01 DIAGNOSIS — F90.0 ATTENTION DEFICIT HYPERACTIVITY DISORDER (ADHD), PREDOMINANTLY INATTENTIVE TYPE: Primary | ICD-10-CM

## 2025-01-02 RX ORDER — LISDEXAMFETAMINE DIMESYLATE 30 MG/1
30 CAPSULE ORAL DAILY
Qty: 30 CAPSULE | Refills: 0 | Status: SHIPPED | OUTPATIENT
Start: 2025-01-02

## 2025-01-29 ENCOUNTER — VIRTUAL VISIT (OUTPATIENT)
Dept: FAMILY MEDICINE | Facility: CLINIC | Age: 55
End: 2025-01-29
Payer: COMMERCIAL

## 2025-01-29 DIAGNOSIS — Z90.710 HX OF HYSTERECTOMY: ICD-10-CM

## 2025-01-29 DIAGNOSIS — F90.0 ATTENTION DEFICIT HYPERACTIVITY DISORDER (ADHD), PREDOMINANTLY INATTENTIVE TYPE: Primary | ICD-10-CM

## 2025-01-29 PROCEDURE — 98005 SYNCH AUDIO-VIDEO EST LOW 20: CPT | Performed by: FAMILY MEDICINE

## 2025-01-29 NOTE — PROGRESS NOTES
Allyssa is a 54 year old who is being evaluated via a billable video visit.    How would you like to obtain your AVS? MyChart  If the video visit is dropped, the invitation should be resent by: Text to cell phone: [phone]  Will anyone else be joining your video visit? No    Assessment & Plan  Attention deficit hyperactivity disorder (ADHD), predominantly inattentive type  Adhd controlled with vyvanse.   Continue Vyvanse 30 mg p.o. daily.    reviewed 1/29/2025   Csa signed 8/2024  Follow-up in 4-6 months         Hx of hysterectomy  Hx hysterectomy. Had bilateral salpingoophrectomy.   She says gyn will want me to take over this rx in the future.   She says Luis Armando dot 0.1 mg/ 24 hour bi weekly patch.   I did recommend she get notes from gyn stating that she should be on this indefinitely and if there is clear documentation I can take over the prescription.               Subjective   Allyssa is a 54 year old, presenting for the following health issues:  A.D.H.D (Follow-up/)        1/29/2025     2:57 PM   Additional Questions   Roomed by sac   Accompanied by self         1/29/2025     2:57 PM   Patient Reported Additional Medications   Patient reports taking the following new medications no     History of Present Illness       Reason for visit:  ADHD updated evaluation She is missing 2 dose(s) of medications per week.  She is not taking prescribed medications regularly due to other.          Objective           Vitals:  No vitals were obtained today due to virtual visit.    Physical Exam   GENERAL: alert and no distress  EYES: Eyes grossly normal to inspection.  No discharge or erythema, or obvious scleral/conjunctival abnormalities.  RESP: No audible wheeze, cough, or visible cyanosis.    SKIN: Visible skin clear. No significant rash, abnormal pigmentation or lesions.  NEURO: Cranial nerves grossly intact.  Mentation and speech appropriate for age.  PSYCH: Appropriate affect, tone, and pace of words           Video-Visit Details    Type of service:  Video Visit   Originating Location (pt. Location): Home  Distant Location (provider location):  On-site  Platform used for Video Visit: Rosalba  Signed Electronically by: Melody De Guzman MD

## 2025-01-29 NOTE — ASSESSMENT & PLAN NOTE
Adhd controlled with vyvanse.   Continue Vyvanse 30 mg p.o. daily.    reviewed 1/29/2025   Csa signed 8/2024  Follow-up in 4-6 months

## 2025-01-29 NOTE — ASSESSMENT & PLAN NOTE
Hx hysterectomy. Had bilateral salpingoophrectomy.   She says gyn will want me to take over this rx in the future.   She says Luis Armando hong 0.1 mg/ 24 hour bi weekly patch.   I did recommend she get notes from gyn stating that she should be on this indefinitely and if there is clear documentation I can take over the prescription.

## 2025-02-07 DIAGNOSIS — F90.0 ATTENTION DEFICIT HYPERACTIVITY DISORDER (ADHD), PREDOMINANTLY INATTENTIVE TYPE: ICD-10-CM

## 2025-02-10 RX ORDER — LISDEXAMFETAMINE DIMESYLATE 30 MG/1
30 CAPSULE ORAL DAILY
Qty: 30 CAPSULE | Refills: 0 | Status: SHIPPED | OUTPATIENT
Start: 2025-02-10

## 2025-03-15 ENCOUNTER — HEALTH MAINTENANCE LETTER (OUTPATIENT)
Age: 55
End: 2025-03-15

## 2025-07-21 ENCOUNTER — PATIENT OUTREACH (OUTPATIENT)
Dept: CARE COORDINATION | Facility: CLINIC | Age: 55
End: 2025-07-21
Payer: COMMERCIAL

## 2025-07-23 ENCOUNTER — ANCILLARY PROCEDURE (OUTPATIENT)
Dept: MAMMOGRAPHY | Facility: HOSPITAL | Age: 55
End: 2025-07-23
Attending: FAMILY MEDICINE
Payer: COMMERCIAL

## 2025-07-23 ENCOUNTER — OFFICE VISIT (OUTPATIENT)
Dept: FAMILY MEDICINE | Facility: CLINIC | Age: 55
End: 2025-07-23
Attending: FAMILY MEDICINE
Payer: COMMERCIAL

## 2025-07-23 ENCOUNTER — RESULTS FOLLOW-UP (OUTPATIENT)
Dept: FAMILY MEDICINE | Facility: CLINIC | Age: 55
End: 2025-07-23

## 2025-07-23 VITALS
WEIGHT: 168.9 LBS | TEMPERATURE: 98.2 F | RESPIRATION RATE: 16 BRPM | BODY MASS INDEX: 28.83 KG/M2 | SYSTOLIC BLOOD PRESSURE: 126 MMHG | DIASTOLIC BLOOD PRESSURE: 73 MMHG | HEART RATE: 73 BPM | HEIGHT: 64 IN | OXYGEN SATURATION: 98 %

## 2025-07-23 DIAGNOSIS — Z13.0 SCREENING FOR ENDOCRINE, NUTRITIONAL, METABOLIC AND IMMUNITY DISORDER: ICD-10-CM

## 2025-07-23 DIAGNOSIS — Z13.228 SCREENING FOR METABOLIC DISORDER: ICD-10-CM

## 2025-07-23 DIAGNOSIS — K76.0 FATTY LIVER: ICD-10-CM

## 2025-07-23 DIAGNOSIS — Z13.0 SCREENING, ANEMIA, DEFICIENCY, IRON: ICD-10-CM

## 2025-07-23 DIAGNOSIS — Z13.29 SCREENING FOR ENDOCRINE, NUTRITIONAL, METABOLIC AND IMMUNITY DISORDER: ICD-10-CM

## 2025-07-23 DIAGNOSIS — Z13.21 SCREENING FOR ENDOCRINE, NUTRITIONAL, METABOLIC AND IMMUNITY DISORDER: ICD-10-CM

## 2025-07-23 DIAGNOSIS — Z13.228 SCREENING FOR ENDOCRINE, NUTRITIONAL, METABOLIC AND IMMUNITY DISORDER: ICD-10-CM

## 2025-07-23 DIAGNOSIS — Z13.1 SCREENING FOR DIABETES MELLITUS: ICD-10-CM

## 2025-07-23 DIAGNOSIS — Z13.220 SCREENING, LIPID: ICD-10-CM

## 2025-07-23 DIAGNOSIS — Z23 IMMUNIZATION DUE: ICD-10-CM

## 2025-07-23 DIAGNOSIS — Z13.29 SCREENING FOR THYROID DISORDER: ICD-10-CM

## 2025-07-23 DIAGNOSIS — F90.0 ATTENTION DEFICIT HYPERACTIVITY DISORDER (ADHD), PREDOMINANTLY INATTENTIVE TYPE: Primary | ICD-10-CM

## 2025-07-23 DIAGNOSIS — E78.2 MIXED HYPERLIPIDEMIA: ICD-10-CM

## 2025-07-23 DIAGNOSIS — E66.3 OVERWEIGHT WITH BODY MASS INDEX (BMI) OF 28 TO 28.9 IN ADULT: ICD-10-CM

## 2025-07-23 DIAGNOSIS — Z12.31 ENCOUNTER FOR SCREENING MAMMOGRAM FOR MALIGNANT NEOPLASM OF BREAST: ICD-10-CM

## 2025-07-23 LAB
BASOPHILS # BLD AUTO: 0 10E3/UL (ref 0–0.2)
BASOPHILS NFR BLD AUTO: 0 %
EOSINOPHIL # BLD AUTO: 0.4 10E3/UL (ref 0–0.7)
EOSINOPHIL NFR BLD AUTO: 5 %
ERYTHROCYTE [DISTWIDTH] IN BLOOD BY AUTOMATED COUNT: 15.8 % (ref 10–15)
EST. AVERAGE GLUCOSE BLD GHB EST-MCNC: 100 MG/DL
HBA1C MFR BLD: 5.1 % (ref 0–5.6)
HCT VFR BLD AUTO: 45.7 % (ref 35–47)
HGB BLD-MCNC: 15.5 G/DL (ref 11.7–15.7)
IMM GRANULOCYTES # BLD: 0 10E3/UL
IMM GRANULOCYTES NFR BLD: 0 %
LYMPHOCYTES # BLD AUTO: 2.6 10E3/UL (ref 0.8–5.3)
LYMPHOCYTES NFR BLD AUTO: 37 %
MCH RBC QN AUTO: 30.6 PG (ref 26.5–33)
MCHC RBC AUTO-ENTMCNC: 33.9 G/DL (ref 31.5–36.5)
MCV RBC AUTO: 90 FL (ref 78–100)
MONOCYTES # BLD AUTO: 0.7 10E3/UL (ref 0–1.3)
MONOCYTES NFR BLD AUTO: 10 %
NEUTROPHILS # BLD AUTO: 3.3 10E3/UL (ref 1.6–8.3)
NEUTROPHILS NFR BLD AUTO: 48 %
NRBC # BLD AUTO: 0 10E3/UL
NRBC BLD AUTO-RTO: 0 /100
PLATELET # BLD AUTO: 303 10E3/UL (ref 150–450)
RBC # BLD AUTO: 5.06 10E6/UL (ref 3.8–5.2)
WBC # BLD AUTO: 7 10E3/UL (ref 4–11)

## 2025-07-23 PROCEDURE — 3078F DIAST BP <80 MM HG: CPT | Performed by: FAMILY MEDICINE

## 2025-07-23 PROCEDURE — 91320 SARSCV2 VAC 30MCG TRS-SUC IM: CPT | Performed by: FAMILY MEDICINE

## 2025-07-23 PROCEDURE — 77063 BREAST TOMOSYNTHESIS BI: CPT

## 2025-07-23 PROCEDURE — 3074F SYST BP LT 130 MM HG: CPT | Performed by: FAMILY MEDICINE

## 2025-07-23 PROCEDURE — 84443 ASSAY THYROID STIM HORMONE: CPT | Performed by: FAMILY MEDICINE

## 2025-07-23 PROCEDURE — 80061 LIPID PANEL: CPT | Performed by: FAMILY MEDICINE

## 2025-07-23 PROCEDURE — 85025 COMPLETE CBC W/AUTO DIFF WBC: CPT | Performed by: FAMILY MEDICINE

## 2025-07-23 PROCEDURE — 83036 HEMOGLOBIN GLYCOSYLATED A1C: CPT | Performed by: FAMILY MEDICINE

## 2025-07-23 PROCEDURE — 80053 COMPREHEN METABOLIC PANEL: CPT | Performed by: FAMILY MEDICINE

## 2025-07-23 PROCEDURE — 36415 COLL VENOUS BLD VENIPUNCTURE: CPT | Performed by: FAMILY MEDICINE

## 2025-07-23 PROCEDURE — 82306 VITAMIN D 25 HYDROXY: CPT | Performed by: FAMILY MEDICINE

## 2025-07-23 PROCEDURE — 90480 ADMN SARSCOV2 VAC 1/ONLY CMP: CPT | Performed by: FAMILY MEDICINE

## 2025-07-23 PROCEDURE — 99214 OFFICE O/P EST MOD 30 MIN: CPT | Mod: 25 | Performed by: FAMILY MEDICINE

## 2025-07-23 RX ORDER — DEXTROAMPHETAMINE SACCHARATE, AMPHETAMINE ASPARTATE, DEXTROAMPHETAMINE SULFATE AND AMPHETAMINE SULFATE 7.5; 7.5; 7.5; 7.5 MG/1; MG/1; MG/1; MG/1
TABLET ORAL
COMMUNITY

## 2025-07-23 RX ORDER — LISDEXAMFETAMINE DIMESYLATE 30 MG/1
30 CAPSULE ORAL EVERY MORNING
Qty: 30 CAPSULE | Refills: 0 | Status: SHIPPED | OUTPATIENT
Start: 2025-08-22 | End: 2025-09-21

## 2025-07-23 RX ORDER — LISDEXAMFETAMINE DIMESYLATE 30 MG/1
30 CAPSULE ORAL EVERY MORNING
Qty: 30 CAPSULE | Refills: 0 | Status: SHIPPED | OUTPATIENT
Start: 2025-07-23 | End: 2025-08-22

## 2025-07-23 RX ORDER — LISDEXAMFETAMINE DIMESYLATE 30 MG/1
30 CAPSULE ORAL EVERY MORNING
Qty: 30 CAPSULE | Refills: 0 | Status: SHIPPED | OUTPATIENT
Start: 2025-09-21 | End: 2025-10-21

## 2025-07-23 NOTE — ASSESSMENT & PLAN NOTE
Weight reduction recommended.  She  is participating in the Weight Loss Program at [facility].   Orders:    semaglutide-weight management (WEGOVY) 0.25 MG/0.5ML pen; Inject 0.5 mLs (0.25 mg) subcutaneously once a week.

## 2025-07-23 NOTE — ASSESSMENT & PLAN NOTE
Adhd controlled with vyvanse.   Continue Vyvanse 30 mg p.o. daily.    reviewed 7/23/2025   Csa signed 7/23/2025   Follow-up in 4-6 months    Orders:    lisdexamfetamine (VYVANSE) 30 MG capsule; Take 1 capsule (30 mg) by mouth every morning.    lisdexamfetamine (VYVANSE) 30 MG capsule; Take 1 capsule (30 mg) by mouth every morning.    lisdexamfetamine (VYVANSE) 30 MG capsule; Take 1 capsule (30 mg) by mouth every morning.

## 2025-07-23 NOTE — PROGRESS NOTES
I spent a total of 31 minutes with this patient, and review of the medical record and with documentation.  This time was spent on the day of service.      Assessment & Plan  Attention deficit hyperactivity disorder (ADHD), predominantly inattentive type  Adhd controlled with vyvanse.   Continue Vyvanse 30 mg p.o. daily.    reviewed 7/23/2025   Csa signed 7/23/2025   Follow-up in 4-6 months    Orders:    lisdexamfetamine (VYVANSE) 30 MG capsule; Take 1 capsule (30 mg) by mouth every morning.    lisdexamfetamine (VYVANSE) 30 MG capsule; Take 1 capsule (30 mg) by mouth every morning.    lisdexamfetamine (VYVANSE) 30 MG capsule; Take 1 capsule (30 mg) by mouth every morning.    Overweight with body mass index (BMI) of 28 to 28.9 in adult  Weight is improving.  Today for a Vyvanse refill for her ADHD.  For her weight-she did take 2 years ago.  She has opted to work on lifestyle before starting medications.      However she is starting to plateau in her weight loss and would like to start some Wegovy today.  Risks and benefits were reviewed.  She would like to proceed.  I did suggest that she also make a weight loss restart appointment so that we can go over all the lifestyle modifications and make sure there are not other measures that we should be taking to help her optimize her weight loss.  She does plan on scheduling a weight loss restart visit.    Med notes:   VYVANSE  Using for adhd    WEGOVY  7/23/2025 rx sent    Zepbound - could consider  Saxenda - could consider   Metformin - could consider.   Wellbutrin - could consider.   Naltrexone - could consider.   Contrave - could consider.   Phentermine/ diethylproprion -avoid due to hx palpitations  Topamax - could consider.   Qsymia - avoid due to hx palpitations  Orlistat - could consider.   Plenity - could consider.     Goals: start zepbound  Schedule weight loss restart so we can ensure optimizing lifestyle  Labs drawn today, review at follow-up.   Remember to  update ADHD meds at follow-up since she is doing that treatment concurrently  Plan follow-up in 3 months.     Orders:    semaglutide-weight management (WEGOVY) 0.25 MG/0.5ML pen; Inject 0.5 mLs (0.25 mg) subcutaneously once a week.    Mixed hyperlipidemia  Weight reduction recommended.  She  is participating in the Weight Loss Program at [facility].   Orders:    semaglutide-weight management (WEGOVY) 0.25 MG/0.5ML pen; Inject 0.5 mLs (0.25 mg) subcutaneously once a week.    Lipid Profile; Future    Fatty liver  Weight reduction recommended.  She  is participating in the Weight Loss Program at [facility].   Orders:    semaglutide-weight management (WEGOVY) 0.25 MG/0.5ML pen; Inject 0.5 mLs (0.25 mg) subcutaneously once a week.    Immunization due    Orders:    COVID-19 12+ (PFIZER)    Screening for endocrine, nutritional, metabolic and immunity disorder    Orders:    Vitamin D Deficiency; Future    Screening, anemia, deficiency, iron    Orders:    CBC with Platelets & Differential; Future    Screening for metabolic disorder    Orders:    Comprehensive metabolic panel; Future    Screening, lipid         Screening for diabetes mellitus    Orders:    Hemoglobin A1c; Future    Screening for thyroid disorder    Orders:    TSH with free T4 reflex; Future       Subjective   Allyssa is a 55 year old, presenting for the following health issues:  Recheck Medication (Adderral ) and OTHER (Discuss wt loss )        7/23/2025     3:17 PM   Additional Questions   Roomed by Percy Ro MA   Accompanied by Self         7/23/2025     3:17 PM   Patient Reported Additional Medications   Patient reports taking the following new medications None     History of Present Illness       Reason for visit:  ADHD check    She eats 2-3 servings of fruits and vegetables daily.She consumes 1 sweetened beverage(s) daily.She exercises with enough effort to increase her heart rate 20 to 29 minutes per day.  She exercises with  "enough effort to increase her heart rate 4 days per week. She is missing 6 dose(s) of medications per week.  She is not taking prescribed medications regularly due to other.            Objective    /73 (BP Location: Left arm, Patient Position: Sitting, Cuff Size: Adult Large)   Pulse 73   Temp 98.2  F (36.8  C) (Oral)   Resp 16   Ht 1.626 m (5' 4\")   Wt 76.6 kg (168 lb 14.4 oz)   LMP 01/26/2024 (Exact Date)   SpO2 98%   BMI 28.99 kg/m    Body mass index is 28.99 kg/m .  Physical Exam  Constitutional:       Appearance: Normal appearance.   HENT:      Head: Normocephalic and atraumatic.   Neck:      Thyroid: No thyroid mass, thyromegaly or thyroid tenderness.   Cardiovascular:      Rate and Rhythm: Normal rate and regular rhythm.   Pulmonary:      Effort: Pulmonary effort is normal.   Musculoskeletal:         General: Normal range of motion.      Cervical back: Normal range of motion and neck supple.   Neurological:      General: No focal deficit present.      Mental Status: She is alert and oriented to person, place, and time.                Signed Electronically by: Melody De Guzman MD    "

## 2025-07-23 NOTE — ASSESSMENT & PLAN NOTE
Weight reduction recommended.  She  is participating in the Weight Loss Program at [facility].   Orders:    semaglutide-weight management (WEGOVY) 0.25 MG/0.5ML pen; Inject 0.5 mLs (0.25 mg) subcutaneously once a week.    Lipid Profile; Future

## 2025-07-23 NOTE — ASSESSMENT & PLAN NOTE
Weight is improving.  Today for a Vyvanse refill for her ADHD.  For her weight-she did take 2 years ago.  She has opted to work on lifestyle before starting medications.      However she is starting to plateau in her weight loss and would like to start some Wegovy today.  Risks and benefits were reviewed.  She would like to proceed.  I did suggest that she also make a weight loss restart appointment so that we can go over all the lifestyle modifications and make sure there are not other measures that we should be taking to help her optimize her weight loss.  She does plan on scheduling a weight loss restart visit.    Med notes:   VYVANSE  Using for adhd    WEGOVY  7/23/2025 rx sent    Zepbound - could consider  Saxenda - could consider   Metformin - could consider.   Wellbutrin - could consider.   Naltrexone - could consider.   Contrave - could consider.   Phentermine/ diethylproprion -avoid due to hx palpitations  Topamax - could consider.   Qsymia - avoid due to hx palpitations  Orlistat - could consider.   Plenity - could consider.     Goals: start zepbound  Schedule weight loss restart so we can ensure optimizing lifestyle  Labs drawn today, review at follow-up.   Remember to update ADHD meds at follow-up since she is doing that treatment concurrently  Plan follow-up in 3 months.     Orders:    semaglutide-weight management (WEGOVY) 0.25 MG/0.5ML pen; Inject 0.5 mLs (0.25 mg) subcutaneously once a week.

## 2025-07-23 NOTE — LETTER
[facility]  07/23/25  Patient: Allyssa Pedersen  YOB: 1970  Medical Record Number: [id number]                                                                                  Non-Opioid Controlled Substance Agreement    This is an agreement between you and your provider regarding safe and appropriate use of controlled substances prescribed by your care team. Controlled substances are?medicines that can cause physical and mental dependence (abuse).     There are strict laws about having and using these medicines. We here at [facility] are  committed to working with you in your efforts to get better. To support you in this work, we'll help you schedule regular office appointments for medicine refills. If we must cancel or change your appointment for any reason, we'll make sure you have enough medicine to last until your next appointment.     As a Provider, I will:   Listen carefully to your concerns while treating you with respect.   Recommend a treatment plan that I believe is in your best interest and may involve therapies other than medicine.    Talk with you often about the possible benefits and the risk of harm of any medicine that we prescribe for you.  Assess the safety of this medicine and check how well it works.    Provide a plan on how to taper (discontinue or go off) using this medicine if the decision is made to stop its use.      ::  As a Patient, I understand controlled substances:     Are prescribed by my care provider to help me function or work and manage my condition(s).?  Are strong medicines and can cause serious side effects.     Need to be taken exactly as prescribed.?Combining controlled substances with certain medicines or chemicals (such as illegal drugs, alcohol, sedatives, sleeping pills, and benzodiazepines) can be dangerous or even fatal.? If I stop taking my medicines suddenly, I may have severe withdrawal symptoms.     The risks, benefits, and  side effects of these medicine(s) were explained to me. I agree that:    I will take part in other treatments as advised by my care team. This may be psychiatry or counseling, physical therapy, behavioral therapy, group treatment or a referral to specialist.    I will keep all my appointments and understand this is part of the monitoring of controlled substances.?My care team may require an office visit for EVERY controlled substance refill. If I miss appointments or don t follow instructions, my care team may stop my medicine    I will take my medicines as prescribed. I will not change the dose or schedule unless my care team tells me to. There will be no refills if I run out early.      I may be asked to come to the clinic and complete a urine drug test or complete a pill count. If I don t give a urine sample or participate in a pill count, the care team may stop my medicine.    I will only receive controlled substance prescriptions from this clinic. If I am treated by another provider, I will tell them that I am taking controlled substances and that I have a treatment agreement with this provider. I will inform my [facility] care team within one business day if I am given a prescription for any controlled substance by another healthcare provider. My [facility] care team can contact other providers and pharmacists about my use of any medicines.    It is up to me to make sure that I don't run out of my medicines on weekends or holidays.?If my care team is willing to refill my prescription without a visit, I must request refills only during office hours. Refills may take up to 3 business days to process. I will use one pharmacy to fill all my controlled substance prescriptions. I will notify the clinic about any changes to my insurance or medicine availability.    I am responsible for my prescriptions. If the medicine/prescription is lost, stolen or destroyed, it will not be replaced.?I also agree not  to share controlled substance medicines with anyone.     I am aware I should not use any illegal or recreational drugs. I agree not to drink alcohol unless my care team says I can.     If I enroll in the [place] [facility] Cann[facility] program, I will tell my care team before my next refill.    I will tell my care team right away if I become pregnant, have a new medical problem treated outside of my regular clinic, or have a change in my medicines.     I understand that this medicine can affect my thinking, judgment and reaction time.? Alcohol and drugs affect the brain and body, which can affect the safety of my driving. Being under the influence of alcohol or drugs can affect my decision-making, behaviors, personal safety and the safety of others. Driving while impaired (DWI) can occur if a person is driving, operating or in physical control of a car, motorcycle, boat, snowmobile, ATV, motorbike, off-road vehicle or any other motor vehicle ([place] Statute 169A.20). I understand the risk if I choose to drive or operate any vehicle or machinery.    I understand that if I do not follow any of the conditions above, my prescriptions or treatment may be stopped or changed.   I agree that my provider, clinic care team and pharmacy may work with any city, state or federal law enforcement agency that investigates the misuse, sale or other diversion of my controlled medicine. I will allow my provider to discuss my care with, or share a copy of, this agreement with any other treating provider, pharmacy or emergency room where I receive care.     I have read this agreement and have asked questions about anything I did not understand.    ________________________________________________________  Patient Signature - Allyssa Pedersen     ___________________                   Date     ________________________________________________________  Provider Signature - Melody De Guzman MD       ___________________                   Date      ________________________________________________________  Witness Signature (required if provider not present while patient signing)          ___________________                   Date

## 2025-07-23 NOTE — PATIENT INSTRUCTIONS
Here is the link to the wegovy dosing schedule  [email or web address]    Here is the link to common side effects:  [email or web address]-[email or web address]    Here is a link to how to take wegovy:  [email or web address]-to-use-the-wegovy-pen.[email or web address]    Do not use Wegovy  if:  you or any of your family have ever had a type of thyroid cancer called medullary thyroid carcinoma (MTC) or if you have an endocrine system condition called Multiple Endocrine Neoplasia syndrome type 2 (MEN 2)  you have had a serious allergic reaction to semaglutide or any of the ingredients in Wegovy   Before using Wegovy , tell your healthcare provider if you have any other medical conditions, including if you:  have or have had problems with your pancreas or kidneys  have type 2 diabetes and a history of diabetic retinopathy  have or have had depression, suicidal thoughts, or mental health issues  are pregnant or plan to become pregnant. Wegovy  may harm your unborn baby. You should stop using Wegovy  2 months before you plan to become pregnant  are breastfeeding or plan to breastfeed. It is not known if Wegovy  passes into your breast milk  Tell your healthcare provider about all the medicines you take, including prescription and over-the-counter medicines, vitamins, and herbal supplements. Wegovy  may affect the way some medicines work and some medicines may affect the way Wegovy  works. Tell your healthcare provider if you are taking other medicines to treat diabetes, including sulfonylureas or insulin.    Wegovy  slows stomach emptying and can affect medicines that need to pass through the stomach quickly.    What are the possible side effects of Wegovy ?  Wegovy  may cause serious side effects, including:    inflammation of your pancreas (pancreatitis). Stop using Wegovy  and call your healthcare provider right away if you have severe pain in your stomach area (abdomen)  that will not go away, with or without vomiting. You may feel the pain from your abdomen to your back  gallbladder problems. Wegovy  may cause gallbladder problems, including gallstones. Some gallstones may need surgery. Call your healthcare provider if you have symptoms, such as pain in your upper stomach (abdomen), fever, yellowing of the skin or eyes (jaundice), or antione- colored stools  increased risk of low blood sugar (hypoglycemia), especially those who also take medicines for diabetes such as insulin or sulfonylureas. This can be a serious side effect. Talk to your healthcare provider about how to recognize and treat low blood sugar and check your blood sugar before you start and while you take Wegovy . Signs and symptoms of low blood sugar may include dizziness or light-headedness, blurred vision, anxiety, irritability or mood changes, sweating, slurred speech, hunger, confusion or drowsiness, shakiness, weakness, headache, fast heartbeat, or feeling jittery  kidney problems (kidney failure). In people who have kidney problems, diarrhea, nausea, and vomiting may cause a loss of fluids (dehydration), which may cause kidney problems to get worse. It is important for you to drink fluids to help reduce your chance of dehydration  serious allergic reactions. Stop using Wegovy  and get medical help right away, if you have any symptoms of a serious allergic reaction, including swelling of your face, lips, tongue, or throat; problems breathing or swallowing; severe rash or itching; fainting or feeling dizzy; or very rapid heartbeat  change in vision in people with type 2 diabetes. Tell your healthcare provider if you have changes in vision during treatment with Wegovy   increased heart rate. Wegovy  can increase your heart rate while you are at rest. Tell your healthcare provider if you feel your heart racing or pounding in your chest and it lasts for several minutes  depression or thoughts of suicide. You should  pay attention to any mental changes, especially sudden changes in your mood, behaviors, thoughts, or feelings. Call your healthcare provider right away if you have any mental changes that are new, worse, or worry you       The most common side effects of Wegovy  may include: nausea, diarrhea, vomiting, constipation, stomach (abdomen) pain, headache, tiredness (fatigue), upset stomach, dizziness, feeling bloated, belching, low blood sugar in people with type 2 diabetes, gas, stomach flu, heartburn, and runny nose or sore throat.  Wegovy must be started slowly.  It takes 5 months to get up to a full dose of Wegovy. The injections should be done once weekly.  The dosing escalation schedule is as follows:   Month 1 - 0.25mg subcutaneous per week  Month 2 - 0.5mg subcutaneous per week  Month 3 - 1.0mg subcutaneous per week  Month 4 - 1.7mg subcutaneous per week  Month 5 and onwards - 2.4 mg subcutaneous per week

## 2025-07-24 LAB
ALBUMIN SERPL BCG-MCNC: 4.3 G/DL (ref 3.5–5.2)
ALP SERPL-CCNC: 76 U/L (ref 40–150)
ALT SERPL W P-5'-P-CCNC: 21 U/L (ref 0–50)
ANION GAP SERPL CALCULATED.3IONS-SCNC: 11 MMOL/L (ref 7–15)
AST SERPL W P-5'-P-CCNC: 27 U/L (ref 0–45)
BILIRUB SERPL-MCNC: 0.2 MG/DL
BUN SERPL-MCNC: 22.8 MG/DL (ref 6–20)
CALCIUM SERPL-MCNC: 9.7 MG/DL (ref 8.8–10.4)
CHLORIDE SERPL-SCNC: 100 MMOL/L (ref 98–107)
CHOLEST SERPL-MCNC: 159 MG/DL
CREAT SERPL-MCNC: 0.79 MG/DL (ref 0.51–0.95)
EGFRCR SERPLBLD CKD-EPI 2021: 88 ML/MIN/1.73M2
FASTING STATUS PATIENT QL REPORTED: NO
FASTING STATUS PATIENT QL REPORTED: NO
GLUCOSE SERPL-MCNC: 94 MG/DL (ref 70–99)
HCO3 SERPL-SCNC: 27 MMOL/L (ref 22–29)
HDLC SERPL-MCNC: 56 MG/DL
LDLC SERPL CALC-MCNC: 88 MG/DL
NONHDLC SERPL-MCNC: 103 MG/DL
POTASSIUM SERPL-SCNC: 4.3 MMOL/L (ref 3.4–5.3)
PROT SERPL-MCNC: 7.5 G/DL (ref 6.4–8.3)
SODIUM SERPL-SCNC: 138 MMOL/L (ref 135–145)
TRIGL SERPL-MCNC: 77 MG/DL
TSH SERPL DL<=0.005 MIU/L-ACNC: 1.27 UIU/ML (ref 0.3–4.2)
VIT D+METAB SERPL-MCNC: 27 NG/ML (ref 20–50)

## 2025-08-18 ENCOUNTER — PATIENT OUTREACH (OUTPATIENT)
Dept: CARE COORDINATION | Facility: CLINIC | Age: 55
End: 2025-08-18
Payer: COMMERCIAL